# Patient Record
Sex: FEMALE | Race: WHITE | NOT HISPANIC OR LATINO | Employment: PART TIME | URBAN - METROPOLITAN AREA
[De-identification: names, ages, dates, MRNs, and addresses within clinical notes are randomized per-mention and may not be internally consistent; named-entity substitution may affect disease eponyms.]

---

## 2017-03-10 ENCOUNTER — HOSPITAL ENCOUNTER (OUTPATIENT)
Dept: LAB | Facility: MEDICAL CENTER | Age: 57
End: 2017-03-10
Attending: FAMILY MEDICINE
Payer: COMMERCIAL

## 2017-03-10 LAB — TSH SERPL DL<=0.005 MIU/L-ACNC: 2.63 UIU/ML (ref 0.3–3.7)

## 2017-03-10 PROCEDURE — 84443 ASSAY THYROID STIM HORMONE: CPT

## 2017-04-26 DIAGNOSIS — L73.9 FOLLICULITIS: ICD-10-CM

## 2017-04-26 RX ORDER — CIPROFLOXACIN 500 MG/1
500 TABLET, FILM COATED ORAL 2 TIMES DAILY
Qty: 28 TAB | Refills: 1 | Status: SHIPPED | OUTPATIENT
Start: 2017-04-26 | End: 2017-05-10

## 2017-06-09 ENCOUNTER — HOSPITAL ENCOUNTER (OUTPATIENT)
Facility: MEDICAL CENTER | Age: 57
End: 2017-06-09
Attending: FAMILY MEDICINE
Payer: COMMERCIAL

## 2017-06-09 LAB
AMBIGUOUS DTTM AMBI4: NORMAL
GRAM STN SPEC: NORMAL
SIGNIFICANT IND 70042: NORMAL
SIGNIFICANT IND 70042: NORMAL
SITE SITE: NORMAL
SITE SITE: NORMAL
SOURCE SOURCE: NORMAL
SOURCE SOURCE: NORMAL

## 2017-06-09 PROCEDURE — 87070 CULTURE OTHR SPECIMN AEROBIC: CPT

## 2017-06-09 PROCEDURE — 87205 SMEAR GRAM STAIN: CPT

## 2017-06-09 PROCEDURE — 87077 CULTURE AEROBIC IDENTIFY: CPT

## 2017-06-09 PROCEDURE — 87186 SC STD MICRODIL/AGAR DIL: CPT

## 2017-06-11 LAB
BACTERIA WND AEROBE CULT: ABNORMAL
GRAM STN SPEC: ABNORMAL
SIGNIFICANT IND 70042: ABNORMAL
SITE SITE: ABNORMAL
SOURCE SOURCE: ABNORMAL

## 2017-09-12 ENCOUNTER — HOSPITAL ENCOUNTER (OUTPATIENT)
Dept: LAB | Facility: MEDICAL CENTER | Age: 57
End: 2017-09-12
Payer: COMMERCIAL

## 2017-09-12 LAB
BDY FAT % MEASURED: 42.1 %
BP DIAS: 82 MMHG
BP SYS: 135 MMHG
CHOLEST SERPL-MCNC: 207 MG/DL (ref 100–199)
DIABETES HTDIA: NO
EVENT NAME HTEVT: NORMAL
FASTING HTFAS: YES
GLUCOSE SERPL-MCNC: 84 MG/DL (ref 65–99)
HDLC SERPL-MCNC: 38 MG/DL
HYPERTENSION HTHYP: NO
LDLC SERPL CALC-MCNC: 140 MG/DL
SCREENING LOC CITY HTCIT: NORMAL
SCREENING LOC STATE HTSTA: NORMAL
SCREENING LOCATION HTLOC: NORMAL
SMOKING HTSMO: NO
SUBSCRIBER ID HTSID: NORMAL
TRIGL SERPL-MCNC: 147 MG/DL (ref 0–149)

## 2017-09-12 PROCEDURE — 36415 COLL VENOUS BLD VENIPUNCTURE: CPT

## 2017-09-12 PROCEDURE — 82947 ASSAY GLUCOSE BLOOD QUANT: CPT

## 2017-09-12 PROCEDURE — S5190 WELLNESS ASSESSMENT BY NONPH: HCPCS

## 2017-09-12 PROCEDURE — 80061 LIPID PANEL: CPT

## 2017-11-29 ENCOUNTER — HOSPITAL ENCOUNTER (OUTPATIENT)
Facility: MEDICAL CENTER | Age: 57
End: 2017-11-29
Attending: UROLOGY
Payer: COMMERCIAL

## 2017-11-29 PROCEDURE — 87086 URINE CULTURE/COLONY COUNT: CPT

## 2017-12-01 LAB
BACTERIA UR CULT: NORMAL
SIGNIFICANT IND 70042: NORMAL
SITE SITE: NORMAL
SOURCE SOURCE: NORMAL

## 2017-12-08 ENCOUNTER — HOSPITAL ENCOUNTER (OUTPATIENT)
Dept: RADIOLOGY | Facility: MEDICAL CENTER | Age: 57
End: 2017-12-08
Attending: UROLOGY
Payer: COMMERCIAL

## 2017-12-08 DIAGNOSIS — N23 RENAL COLIC: ICD-10-CM

## 2017-12-08 DIAGNOSIS — R31.0 GROSS HEMATURIA: ICD-10-CM

## 2017-12-08 DIAGNOSIS — Z87.442 PERSONAL HISTORY OF URINARY CALCULI: ICD-10-CM

## 2017-12-08 PROCEDURE — 74176 CT ABD & PELVIS W/O CONTRAST: CPT

## 2017-12-29 ENCOUNTER — HOSPITAL ENCOUNTER (OUTPATIENT)
Facility: MEDICAL CENTER | Age: 57
End: 2017-12-29
Attending: UROLOGY
Payer: COMMERCIAL

## 2017-12-29 LAB
ALBUMIN SERPL BCP-MCNC: 3.9 G/DL (ref 3.2–4.9)
ALBUMIN/GLOB SERPL: 1.4 G/DL
ALP SERPL-CCNC: 33 U/L (ref 30–99)
ALT SERPL-CCNC: 20 U/L (ref 2–50)
ANION GAP SERPL CALC-SCNC: 5 MMOL/L (ref 0–11.9)
AST SERPL-CCNC: 22 U/L (ref 12–45)
BILIRUB SERPL-MCNC: 0.5 MG/DL (ref 0.1–1.5)
BUN SERPL-MCNC: 17 MG/DL (ref 8–22)
CALCIUM SERPL-MCNC: 9.4 MG/DL (ref 8.5–10.5)
CHLORIDE SERPL-SCNC: 106 MMOL/L (ref 96–112)
CO2 SERPL-SCNC: 30 MMOL/L (ref 20–33)
COLLECT DURATION TIME UR: 24 HR
CREAT CL/1.73 SQ M ?TM UR+SERPL-ARVRAT: 155 ML/MIN (ref 88–128)
CREAT SERPL-MCNC: 0.7 MG/DL (ref 0.5–1.4)
CREAT SERPL-MCNC: 0.7 MG/DL (ref 0.5–1.4)
CREAT UR-MCNC: 179.5 MG/DL
GFR SERPL CREATININE-BSD FRML MDRD: >60 ML/MIN/1.73 M 2
GLOBULIN SER CALC-MCNC: 2.7 G/DL (ref 1.9–3.5)
GLUCOSE SERPL-MCNC: 102 MG/DL (ref 65–99)
POTASSIUM SERPL-SCNC: 4.6 MMOL/L (ref 3.6–5.5)
PROT 24H UR-MCNC: 161 MG/24 HR (ref 30–150)
PROT 24H UR-MRATE: 16.1 MG/DL (ref 0–15)
PROT SERPL-MCNC: 6.6 G/DL (ref 6–8.2)
SODIUM SERPL-SCNC: 141 MMOL/L (ref 135–145)
SPECIMEN VOL UR: 1000 ML
URATE SERPL-MCNC: 3.7 MG/DL (ref 1.9–8.2)
URINE CREATININE EXCRETED 1125: 1795 MG/24 HR
UUN 24H UR-MRATE: 8330 MG/24 HR (ref 12000–20000)

## 2017-12-29 PROCEDURE — 84105 ASSAY OF URINE PHOSPHORUS: CPT

## 2017-12-29 PROCEDURE — 82507 ASSAY OF CITRATE: CPT

## 2017-12-29 PROCEDURE — 82340 ASSAY OF CALCIUM IN URINE: CPT

## 2017-12-29 PROCEDURE — 84560 ASSAY OF URINE/URIC ACID: CPT

## 2017-12-29 PROCEDURE — 84156 ASSAY OF PROTEIN URINE: CPT

## 2017-12-29 PROCEDURE — 84392 ASSAY OF URINE SULFATE: CPT

## 2017-12-29 PROCEDURE — 84550 ASSAY OF BLOOD/URIC ACID: CPT

## 2017-12-29 PROCEDURE — 82575 CREATININE CLEARANCE TEST: CPT

## 2017-12-29 PROCEDURE — 80053 COMPREHEN METABOLIC PANEL: CPT

## 2017-12-29 PROCEDURE — 82131 AMINO ACIDS SINGLE QUANT: CPT

## 2017-12-29 PROCEDURE — 84300 ASSAY OF URINE SODIUM: CPT

## 2017-12-29 PROCEDURE — 83735 ASSAY OF MAGNESIUM: CPT

## 2017-12-29 PROCEDURE — 83945 ASSAY OF OXALATE: CPT

## 2017-12-29 PROCEDURE — 83970 ASSAY OF PARATHORMONE: CPT

## 2017-12-29 PROCEDURE — 82436 ASSAY OF URINE CHLORIDE: CPT

## 2017-12-29 PROCEDURE — 81050 URINALYSIS VOLUME MEASURE: CPT

## 2017-12-29 PROCEDURE — 83986 ASSAY PH BODY FLUID NOS: CPT

## 2017-12-29 PROCEDURE — 84540 ASSAY OF URINE/UREA-N: CPT

## 2017-12-29 PROCEDURE — 84133 ASSAY OF URINE POTASSIUM: CPT

## 2017-12-30 LAB — PTH-INTACT SERPL-MCNC: 27.1 PG/ML (ref 14–72)

## 2018-01-04 LAB
CREAT UR-MCNC: 165 MG/DL
CYSTINE 24H UR-MCNC: 1.23 MG/DL
CYSTINE 24H UR-MRATE: 12 MG/D
CYSTINE/CREAT 24H UR-RTO: 31 UMOL/G CRT (ref 12–81)

## 2018-01-05 LAB
CA H2 PHOS DIHYD 24H UR-MRATE: 3.31
CALCIUM 24H UR-MCNC: 31.9 MG/DL
CALCIUM 24H UR-MRATE: 1.47 MG/(24.H)
CALCIUM 24H UR-MRATE: 319 MG/D
CAOX 24H UR-MRATE: 6.88
CHLORIDE 24H UR-SCNC: 187 MMOL/L
CHLORIDE 24H UR-SRATE: 187 MMOL/D (ref 140–250)
CITRATE 24H UR-MCNC: 905 MG/L
CITRATE 24H UR-MRATE: 905 MG/D (ref 320–1240)
COLLECT DURATION TIME SPEC: 24 HRS
CREAT 24H UR-MCNC: 159 MG/DL
CREAT 24H UR-MRATE: 1590 MG/D (ref 500–1400)
EER SUPERSAT PROFILE UR Q2098: ABNORMAL
MAGNESIUM 24H UR-MCNC: 11.8 MG/DL
MAGNESIUM 24H UR-MRATE: 118 MG/D (ref 12–199)
OXALATE 24H UR-MCNC: 18 MG/L
OXALATE 24H UR-MRATE: 18 MG/D (ref 13–40)
PH UR: 5.84 [PH] (ref 5–7.5)
PHOSPHATE 24H UR-MCNC: 89 MG/DL
PHOSPHATE 24H UR-MRATE: 890 MG/D (ref 400–1300)
POTASSIUM 24H UR-SCNC: 74 MMOL/L
POTASSIUM 24H UR-SRATE: 74 MMOL/D (ref 25–125)
REF LAB TEST RESULTS: ABNORMAL
SODIUM 24H UR-SCNC: 202 MMOL/L
SODIUM 24H UR-SRATE: 202 MMOL/D (ref 51–286)
SULFATE 24H UR-SRATE: 33 MMOL/D (ref 6–30)
SULFATE UR-SCNC: 33 MMOL/L
TOTAL VOLUME 1105: 1000 ML
URATE 24H UR-MCNC: 76.7 MG/DL
URATE 24H UR-MRATE: 767 MG/D (ref 250–750)

## 2018-03-09 ENCOUNTER — HOSPITAL ENCOUNTER (OUTPATIENT)
Dept: LAB | Facility: MEDICAL CENTER | Age: 58
End: 2018-03-09
Attending: FAMILY MEDICINE
Payer: COMMERCIAL

## 2018-03-09 LAB — TSH SERPL DL<=0.005 MIU/L-ACNC: 10.11 UIU/ML (ref 0.38–5.33)

## 2018-03-09 PROCEDURE — 36415 COLL VENOUS BLD VENIPUNCTURE: CPT

## 2018-03-09 PROCEDURE — 84443 ASSAY THYROID STIM HORMONE: CPT

## 2018-03-14 ENCOUNTER — HOSPITAL ENCOUNTER (OUTPATIENT)
Dept: LAB | Facility: MEDICAL CENTER | Age: 58
End: 2018-03-14
Attending: UROLOGY
Payer: COMMERCIAL

## 2018-03-14 PROCEDURE — 87086 URINE CULTURE/COLONY COUNT: CPT

## 2018-03-16 LAB
BACTERIA UR CULT: NORMAL
SIGNIFICANT IND 70042: NORMAL
SITE SITE: NORMAL
SOURCE SOURCE: NORMAL

## 2018-04-13 ENCOUNTER — HOSPITAL ENCOUNTER (OUTPATIENT)
Dept: RADIOLOGY | Facility: MEDICAL CENTER | Age: 58
End: 2018-04-13
Attending: NURSE PRACTITIONER
Payer: COMMERCIAL

## 2018-04-13 DIAGNOSIS — Z12.31 VISIT FOR SCREENING MAMMOGRAM: ICD-10-CM

## 2018-04-13 PROCEDURE — 77067 SCR MAMMO BI INCL CAD: CPT

## 2018-05-14 ENCOUNTER — HOSPITAL ENCOUNTER (OUTPATIENT)
Dept: RADIOLOGY | Facility: MEDICAL CENTER | Age: 58
End: 2018-05-14
Attending: UROLOGY
Payer: COMMERCIAL

## 2018-05-14 ENCOUNTER — HOSPITAL ENCOUNTER (OUTPATIENT)
Facility: MEDICAL CENTER | Age: 58
End: 2018-05-14
Attending: UROLOGY
Payer: COMMERCIAL

## 2018-05-14 DIAGNOSIS — R10.9 STOMACH ACHE: ICD-10-CM

## 2018-05-14 PROCEDURE — 74176 CT ABD & PELVIS W/O CONTRAST: CPT

## 2018-05-14 PROCEDURE — 87086 URINE CULTURE/COLONY COUNT: CPT

## 2018-05-14 PROCEDURE — 87186 SC STD MICRODIL/AGAR DIL: CPT

## 2018-05-14 PROCEDURE — 87077 CULTURE AEROBIC IDENTIFY: CPT

## 2018-05-15 LAB
AMBIGUOUS DTTM AMBI4: NORMAL
SIGNIFICANT IND 70042: NORMAL
SITE SITE: NORMAL
SOURCE SOURCE: NORMAL

## 2018-05-17 LAB
BACTERIA UR CULT: ABNORMAL
BACTERIA UR CULT: ABNORMAL
SIGNIFICANT IND 70042: ABNORMAL
SITE SITE: ABNORMAL
SOURCE SOURCE: ABNORMAL

## 2018-05-18 DIAGNOSIS — Z01.812 PRE-OPERATIVE LABORATORY EXAMINATION: ICD-10-CM

## 2018-05-18 LAB
ALBUMIN SERPL BCP-MCNC: 4.4 G/DL (ref 3.2–4.9)
ALBUMIN/GLOB SERPL: 1.6 G/DL
ALP SERPL-CCNC: 43 U/L (ref 30–99)
ALT SERPL-CCNC: 48 U/L (ref 2–50)
ANION GAP SERPL CALC-SCNC: 6 MMOL/L (ref 0–11.9)
APPEARANCE UR: CLEAR
AST SERPL-CCNC: 26 U/L (ref 12–45)
BASOPHILS # BLD AUTO: 1.6 % (ref 0–1.8)
BASOPHILS # BLD: 0.09 K/UL (ref 0–0.12)
BILIRUB SERPL-MCNC: 0.5 MG/DL (ref 0.1–1.5)
BILIRUB UR QL STRIP.AUTO: NEGATIVE
BUN SERPL-MCNC: 14 MG/DL (ref 8–22)
CALCIUM SERPL-MCNC: 9.6 MG/DL (ref 8.5–10.5)
CHLORIDE SERPL-SCNC: 103 MMOL/L (ref 96–112)
CO2 SERPL-SCNC: 30 MMOL/L (ref 20–33)
COLOR UR: YELLOW
CREAT SERPL-MCNC: 0.77 MG/DL (ref 0.5–1.4)
EOSINOPHIL # BLD AUTO: 0.37 K/UL (ref 0–0.51)
EOSINOPHIL NFR BLD: 6.8 % (ref 0–6.9)
ERYTHROCYTE [DISTWIDTH] IN BLOOD BY AUTOMATED COUNT: 43.2 FL (ref 35.9–50)
GLOBULIN SER CALC-MCNC: 2.8 G/DL (ref 1.9–3.5)
GLUCOSE SERPL-MCNC: 81 MG/DL (ref 65–99)
GLUCOSE UR STRIP.AUTO-MCNC: NEGATIVE MG/DL
HCT VFR BLD AUTO: 42.5 % (ref 37–47)
HGB BLD-MCNC: 14.3 G/DL (ref 12–16)
IMM GRANULOCYTES # BLD AUTO: 0.01 K/UL (ref 0–0.11)
IMM GRANULOCYTES NFR BLD AUTO: 0.2 % (ref 0–0.9)
KETONES UR STRIP.AUTO-MCNC: NEGATIVE MG/DL
LEUKOCYTE ESTERASE UR QL STRIP.AUTO: NEGATIVE
LYMPHOCYTES # BLD AUTO: 1.3 K/UL (ref 1–4.8)
LYMPHOCYTES NFR BLD: 23.7 % (ref 22–41)
MCH RBC QN AUTO: 30.4 PG (ref 27–33)
MCHC RBC AUTO-ENTMCNC: 33.6 G/DL (ref 33.6–35)
MCV RBC AUTO: 90.4 FL (ref 81.4–97.8)
MICRO URNS: NORMAL
MONOCYTES # BLD AUTO: 0.54 K/UL (ref 0–0.85)
MONOCYTES NFR BLD AUTO: 9.9 % (ref 0–13.4)
NEUTROPHILS # BLD AUTO: 3.17 K/UL (ref 2–7.15)
NEUTROPHILS NFR BLD: 57.8 % (ref 44–72)
NITRITE UR QL STRIP.AUTO: NEGATIVE
NRBC # BLD AUTO: 0 K/UL
NRBC BLD-RTO: 0 /100 WBC
PH UR STRIP.AUTO: 7 [PH]
PLATELET # BLD AUTO: 245 K/UL (ref 164–446)
PMV BLD AUTO: 11.7 FL (ref 9–12.9)
POTASSIUM SERPL-SCNC: 3.7 MMOL/L (ref 3.6–5.5)
PROT SERPL-MCNC: 7.2 G/DL (ref 6–8.2)
PROT UR QL STRIP: NEGATIVE MG/DL
RBC # BLD AUTO: 4.7 M/UL (ref 4.2–5.4)
RBC UR QL AUTO: NEGATIVE
SODIUM SERPL-SCNC: 139 MMOL/L (ref 135–145)
SP GR UR STRIP.AUTO: 1.01
UROBILINOGEN UR STRIP.AUTO-MCNC: 0.2 MG/DL
WBC # BLD AUTO: 5.5 K/UL (ref 4.8–10.8)

## 2018-05-18 PROCEDURE — 87086 URINE CULTURE/COLONY COUNT: CPT

## 2018-05-18 PROCEDURE — 36415 COLL VENOUS BLD VENIPUNCTURE: CPT

## 2018-05-18 PROCEDURE — 85025 COMPLETE CBC W/AUTO DIFF WBC: CPT

## 2018-05-18 PROCEDURE — 81003 URINALYSIS AUTO W/O SCOPE: CPT

## 2018-05-18 PROCEDURE — 80053 COMPREHEN METABOLIC PANEL: CPT

## 2018-05-18 RX ORDER — LEVOTHYROXINE SODIUM 137 UG/1
137 TABLET ORAL
Status: SHIPPED | COMMUNITY
End: 2022-07-12 | Stop reason: SDUPTHER

## 2018-05-20 LAB
BACTERIA UR CULT: NORMAL
SIGNIFICANT IND 70042: NORMAL
SITE SITE: NORMAL
SOURCE SOURCE: NORMAL

## 2018-05-23 ENCOUNTER — HOSPITAL ENCOUNTER (OUTPATIENT)
Dept: RADIOLOGY | Facility: MEDICAL CENTER | Age: 58
End: 2018-05-23
Attending: UROLOGY | Admitting: UROLOGY
Payer: COMMERCIAL

## 2018-05-23 ENCOUNTER — HOSPITAL ENCOUNTER (OUTPATIENT)
Facility: MEDICAL CENTER | Age: 58
End: 2018-05-24
Attending: UROLOGY | Admitting: UROLOGY
Payer: COMMERCIAL

## 2018-05-23 ENCOUNTER — APPOINTMENT (OUTPATIENT)
Dept: RADIOLOGY | Facility: MEDICAL CENTER | Age: 58
End: 2018-05-23
Attending: UROLOGY
Payer: COMMERCIAL

## 2018-05-23 DIAGNOSIS — N20.0 CALCULUS OF KIDNEY: ICD-10-CM

## 2018-05-23 PROCEDURE — 160025 RECOVERY II MINUTES (STATS): Performed by: UROLOGY

## 2018-05-23 PROCEDURE — C1769 GUIDE WIRE: HCPCS | Performed by: UROLOGY

## 2018-05-23 PROCEDURE — 160009 HCHG ANES TIME/MIN: Performed by: UROLOGY

## 2018-05-23 PROCEDURE — 160048 HCHG OR STATISTICAL LEVEL 1-5: Performed by: UROLOGY

## 2018-05-23 PROCEDURE — 160029 HCHG SURGERY MINUTES - 1ST 30 MINS LEVEL 4: Performed by: UROLOGY

## 2018-05-23 PROCEDURE — C1758 CATHETER, URETERAL: HCPCS | Performed by: UROLOGY

## 2018-05-23 PROCEDURE — 700102 HCHG RX REV CODE 250 W/ 637 OVERRIDE(OP)

## 2018-05-23 PROCEDURE — 160035 HCHG PACU - 1ST 60 MINS PHASE I: Performed by: UROLOGY

## 2018-05-23 PROCEDURE — C1894 INTRO/SHEATH, NON-LASER: HCPCS | Performed by: UROLOGY

## 2018-05-23 PROCEDURE — 160047 HCHG PACU  - EA ADDL 30 MINS PHASE II: Performed by: UROLOGY

## 2018-05-23 PROCEDURE — 700111 HCHG RX REV CODE 636 W/ 250 OVERRIDE (IP)

## 2018-05-23 PROCEDURE — 700101 HCHG RX REV CODE 250: Mod: JW

## 2018-05-23 PROCEDURE — 501329 HCHG SET, CYSTO IRRIG Y TUR: Performed by: UROLOGY

## 2018-05-23 PROCEDURE — 160002 HCHG RECOVERY MINUTES (STAT): Performed by: UROLOGY

## 2018-05-23 PROCEDURE — 74018 RADEX ABDOMEN 1 VIEW: CPT

## 2018-05-23 PROCEDURE — 500879 HCHG PACK, CYSTO: Performed by: UROLOGY

## 2018-05-23 PROCEDURE — A9270 NON-COVERED ITEM OR SERVICE: HCPCS

## 2018-05-23 PROCEDURE — 160036 HCHG PACU - EA ADDL 30 MINS PHASE I: Performed by: UROLOGY

## 2018-05-23 PROCEDURE — 160041 HCHG SURGERY MINUTES - EA ADDL 1 MIN LEVEL 4: Performed by: UROLOGY

## 2018-05-23 PROCEDURE — G0378 HOSPITAL OBSERVATION PER HR: HCPCS

## 2018-05-23 PROCEDURE — 160046 HCHG PACU - 1ST 60 MINS PHASE II: Performed by: UROLOGY

## 2018-05-23 RX ORDER — LIDOCAINE HYDROCHLORIDE 10 MG/ML
0.5 INJECTION, SOLUTION INFILTRATION; PERINEURAL
Status: DISCONTINUED | OUTPATIENT
Start: 2018-05-23 | End: 2018-05-24 | Stop reason: HOSPADM

## 2018-05-23 RX ORDER — TRIAMCINOLONE ACETONIDE 1 MG/G
CREAM TOPICAL PRN
Status: SHIPPED | COMMUNITY
End: 2022-11-18

## 2018-05-23 RX ORDER — HALOPERIDOL 5 MG/ML
INJECTION INTRAMUSCULAR
Status: COMPLETED
Start: 2018-05-23 | End: 2018-05-23

## 2018-05-23 RX ORDER — ONDANSETRON 2 MG/ML
4 INJECTION INTRAMUSCULAR; INTRAVENOUS EVERY 6 HOURS PRN
Status: DISCONTINUED | OUTPATIENT
Start: 2018-05-23 | End: 2018-05-24 | Stop reason: HOSPADM

## 2018-05-23 RX ORDER — SODIUM CHLORIDE, SODIUM LACTATE, POTASSIUM CHLORIDE, CALCIUM CHLORIDE 600; 310; 30; 20 MG/100ML; MG/100ML; MG/100ML; MG/100ML
1000 INJECTION, SOLUTION INTRAVENOUS CONTINUOUS
Status: DISCONTINUED | OUTPATIENT
Start: 2018-05-23 | End: 2018-05-24 | Stop reason: HOSPADM

## 2018-05-23 RX ORDER — SCOLOPAMINE TRANSDERMAL SYSTEM 1 MG/1
PATCH, EXTENDED RELEASE TRANSDERMAL
Status: COMPLETED
Start: 2018-05-23 | End: 2018-05-23

## 2018-05-23 RX ORDER — DIPHENHYDRAMINE HYDROCHLORIDE 50 MG/ML
INJECTION INTRAMUSCULAR; INTRAVENOUS
Status: COMPLETED
Start: 2018-05-23 | End: 2018-05-23

## 2018-05-23 RX ORDER — HYDROCODONE BITARTRATE AND ACETAMINOPHEN 5; 325 MG/1; MG/1
1-2 TABLET ORAL EVERY 4 HOURS PRN
Status: DISCONTINUED | OUTPATIENT
Start: 2018-05-23 | End: 2018-05-24 | Stop reason: HOSPADM

## 2018-05-23 RX ORDER — SODIUM CHLORIDE, SODIUM LACTATE, POTASSIUM CHLORIDE, CALCIUM CHLORIDE 600; 310; 30; 20 MG/100ML; MG/100ML; MG/100ML; MG/100ML
INJECTION, SOLUTION INTRAVENOUS CONTINUOUS
Status: DISCONTINUED | OUTPATIENT
Start: 2018-05-23 | End: 2018-05-24 | Stop reason: HOSPADM

## 2018-05-23 RX ORDER — ASPIRIN 81 MG/1
81 TABLET, CHEWABLE ORAL DAILY
COMMUNITY

## 2018-05-23 RX ORDER — HYDROCODONE BITARTRATE AND ACETAMINOPHEN 5; 325 MG/1; MG/1
TABLET ORAL
Status: COMPLETED
Start: 2018-05-23 | End: 2018-05-23

## 2018-05-23 RX ORDER — OXYCODONE HCL 5 MG/5 ML
SOLUTION, ORAL ORAL
Status: COMPLETED
Start: 2018-05-23 | End: 2018-05-23

## 2018-05-23 RX ADMIN — HALOPERIDOL LACTATE 1 MG: 5 INJECTION, SOLUTION INTRAMUSCULAR at 17:18

## 2018-05-23 RX ADMIN — HYDROCODONE BITARTRATE AND ACETAMINOPHEN 2 TABLET: 5; 325 TABLET ORAL at 21:37

## 2018-05-23 RX ADMIN — HALOPERIDOL LACTATE 1 MG: 5 INJECTION, SOLUTION INTRAMUSCULAR at 17:28

## 2018-05-23 RX ADMIN — OXYCODONE HYDROCHLORIDE 5 MG: 5 SOLUTION ORAL at 17:43

## 2018-05-23 RX ADMIN — SODIUM CHLORIDE, SODIUM LACTATE, POTASSIUM CHLORIDE, CALCIUM CHLORIDE 1000 ML: 600; 310; 30; 20 INJECTION, SOLUTION INTRAVENOUS at 21:15

## 2018-05-23 RX ADMIN — HYDROMORPHONE HYDROCHLORIDE 0.5 MG: 10 INJECTION, SOLUTION INTRAMUSCULAR; INTRAVENOUS; SUBCUTANEOUS at 17:28

## 2018-05-23 RX ADMIN — FENTANYL CITRATE 50 MCG: 50 INJECTION, SOLUTION INTRAMUSCULAR; INTRAVENOUS at 17:19

## 2018-05-23 RX ADMIN — HYDROMORPHONE HYDROCHLORIDE 0.5 MG: 10 INJECTION, SOLUTION INTRAMUSCULAR; INTRAVENOUS; SUBCUTANEOUS at 18:18

## 2018-05-23 RX ADMIN — SCOPOLAMINE 1 PATCH: 1 PATCH, EXTENDED RELEASE TRANSDERMAL at 16:00

## 2018-05-23 RX ADMIN — DIPHENHYDRAMINE HYDROCHLORIDE 12.5 MG: 50 INJECTION INTRAMUSCULAR; INTRAVENOUS at 17:43

## 2018-05-23 ASSESSMENT — PAIN SCALES - GENERAL
PAINLEVEL_OUTOF10: 5
PAINLEVEL_OUTOF10: 5
PAINLEVEL_OUTOF10: 6
PAINLEVEL_OUTOF10: 5
PAINLEVEL_OUTOF10: 0
PAINLEVEL_OUTOF10: 6
PAINLEVEL_OUTOF10: 5
PAINLEVEL_OUTOF10: 8
PAINLEVEL_OUTOF10: 1
PAINLEVEL_OUTOF10: 6

## 2018-05-23 ASSESSMENT — PATIENT HEALTH QUESTIONNAIRE - PHQ9
SUM OF ALL RESPONSES TO PHQ9 QUESTIONS 1 AND 2: 0
1. LITTLE INTEREST OR PLEASURE IN DOING THINGS: NOT AT ALL
2. FEELING DOWN, DEPRESSED, IRRITABLE, OR HOPELESS: NOT AT ALL

## 2018-05-23 ASSESSMENT — LIFESTYLE VARIABLES: EVER_SMOKED: NEVER

## 2018-05-24 ENCOUNTER — PATIENT OUTREACH (OUTPATIENT)
Dept: HEALTH INFORMATION MANAGEMENT | Facility: OTHER | Age: 58
End: 2018-05-24

## 2018-05-24 VITALS
DIASTOLIC BLOOD PRESSURE: 61 MMHG | WEIGHT: 222.88 LBS | HEIGHT: 65 IN | BODY MASS INDEX: 37.13 KG/M2 | OXYGEN SATURATION: 93 % | SYSTOLIC BLOOD PRESSURE: 132 MMHG | RESPIRATION RATE: 14 BRPM | TEMPERATURE: 97.8 F | HEART RATE: 64 BPM

## 2018-05-24 PROBLEM — N20.1 RIGHT URETERAL STONE: Status: ACTIVE | Noted: 2018-05-24

## 2018-05-24 PROCEDURE — G0378 HOSPITAL OBSERVATION PER HR: HCPCS

## 2018-05-24 ASSESSMENT — PAIN SCALES - GENERAL: PAINLEVEL_OUTOF10: 0

## 2018-05-24 ASSESSMENT — LIFESTYLE VARIABLES: ALCOHOL_USE: NO

## 2018-05-24 NOTE — PROGRESS NOTES
Assessment completed. Pt A&Ox4. Respirations are even and unlabored on RA. Pt denies pain at this time. Patient tolerating fluids at this time. Monitors applied, VS stable, call light and belongings within reach. POC updated. Pt educated on room and call light, pt verbalized understanding. Communication board updated. Needs met.

## 2018-05-24 NOTE — PROGRESS NOTES
IV Dc 'd. Discharge instructions provided to patient. Pt verbalizes understanding. Pt states all questions have been answered. Copy of DC provided to patient. Signed copy in chart. Pt states all personal belongings are in possession. Pt escorted off unit by this RN without incident.

## 2018-05-24 NOTE — OP REPORT
DATE OF SERVICE:  05/23/2018    PREOPERATIVE DIAGNOSIS:  Right proximal ureteral stone.    PROCEDURE PERFORMED:  1.  Rigid cystourethroscopy.  2.  Right flexible ureteroscopy with laser lithotripsy of stone pushed back   into the right renal unit.    POSTOPERATIVE DIAGNOSIS:  Right proximal ureteral stone..    SURGEON:  Matteo Rolle MD    ANESTHESIA:  General laryngeal mask.    ANESTHESIOLOGIST:  Dr. Carl Henry.    COMPLICATIONS:  None.    DRAINS:  None.    INDICATIONS:  The patient is a pleasant 58-year-old woman with a history of   right flank pain and a recent urinary tract infection.  She was treated with   IM Rocephin for 2 days and switched to ciprofloxacin.  When the cultures came   back with sensitivities showing the antibiotics of Cipro to be sent to the   patient has done well since starting antibiotic therapy, but she has a 6 mm   stone identified on the CAT scan.  I explained to the patient I would   recommend treatment of the stone is unlikely to pass and is in proximal   ureter.  Prior to surgery, we discussed the options of shockwave lithotripsy   versus retrograde ureteroscopy, laser lithotripsy, and possible stent.  She is   interested in the latter approach and understands the risk of procedure   include but not limited to risk of perioperative urinary tract infection,   urosepsis, risk of ureteral perforation, the possibility that the stone cannot   not be treated and secondary procedure would be required.  We have also   discussed the risk of stent pain, urgency, frequency and migration of position   as well as the perioperative risk of deep vein thrombosis, pulmonary   embolism, aspiration pneumonia and death.  Informed consent was given to me by   the patient to proceed and she is marked just below her knee on the right   side with my initials in letter Y for proper site right-sided surgery.    DESCRIPTION IN DETAIL:  After informed consent was obtained, the patient was   brought to the  operating room and placed in supine.  A general laryngeal mask   anesthetic administered in balanced fashion.  Bilateral sequential compression   devices are in place and activated and the patient is given IV ciprofloxacin.    She was positioned in modified lithotomy and the operative area was Betadine   prepped and draped in usual sterile fashion.  A surgical time-out was called.    All members of the operative team agree as to the patient's name, procedure   to be performed without objections, attention was directed to the procedure.    I began the procedure by passing a generously lubricated 25-Macedonian rigid   cystoscope per urethra.  Urethra was normal.  Left ureteral orifice is   orthotopic and has clear efflux.  Right ureteral orifice is orthotopic.  It   has mild reflux.  There is a stone in the proximal ureter right at the UPJ as   based on fluoroscopy.  I pushed a 0.38 sensor wire into the right ureteral   orifice and advanced it up to the stone.  The stone went into the mid pole of   the kidney.  I left the guidewire in place and passed ureteral access sheath   inner obturator over the wire.  Then, the 11-Macedonian tapered to 13 Macedonian   ureteral access sheath was positioned in the proximal ureter.  With   hydrodistention and the disposable [employer] flexible ureteroscope   which is digital.  I passed the scope up into the kidney, performed serial   rhinoscopy.  Upper pole anatomy was normal.  Midpole shows a yellowish   brownish stone, which has calcium oxalate dihydrate appearance on the outside   and stronger dense stone in the middle part.  The inferior calyceal anatomy   showed some Sanjeev plaques, but no other stones are seen.  This stone was   pushed in the mid pole porsha treated into very small fragments by all less   than 1 mm utilizing a frequency of 5 Hz and 400 millijoules and then I   decreased the energy to 200 millijoules and used 40 Hz for powdering technique   which allowed to break  the stone into small fragments.  At the end of the   case, all fragments had been evaluated and appeared small enough to pass.  I   went ahead and pushed the safety wire back in the kidney, withdrew the scope   and the ureteral access sheath.  Evaluation of the ureter showed a patent   ureter.  There were no stones in the ureter or significant edema at the UPJ   where the stone had lodged and subsequently when I withdrew the scope, I left   the wire in place, I passed the cystoscope in, withdrew the wire and observed   efflux of bloody efflux in the right ureteral orifice with pretty good   pressures, I elected not to leave a stent. At the end of the case, the patient   tolerated the procedure well.  She was awake in the operating room and   transferred to recovery room, she arrived in stable condition.       ____________________________________     MD RYELE LONG / SHARON    DD:  05/23/2018 17:25:10  DT:  05/23/2018 18:13:04    D#:  [id number]  Job#:  [id number]

## 2018-05-24 NOTE — OR SURGEON
Immediate Post OP Note    PreOp Diagnosis: Right Proximal Ureteral stone    PostOp Diagnosis: As above    Procedure(s):  CYSTOSCOPY - Wound Class: Clean Contaminated  RIGHT FLEXIBLE URETEROSCOPY WITH LASER LITHOTRIPSY- Wound Class: Clean Contaminated      Surgeon(s):  Matteo Rolle M.D.    Anesthesiologist/Type of Anesthesia:  Anesthesiologist: Carl Henry M.D./General LMA    Surgical Staff:  Circulator: Eloise Wilcox R.N.  Laser Staff: Renee Del Valle  Scrub Person: Mai Michael    Specimens removed if any:  None    Estimated Blood Loss: N/A    Findings: 6mm stone pushed back into the right kidney with guide wire  Complications: None        5/23/2018 5:08 PM Matteo Rolle M.D.

## 2018-05-24 NOTE — OR NURSING
Pt remains mildly nauseous but tolerating PO intake. Reports this is normal for her post-procedure. Pain improved with analgesics but pt remains sleepy. Requiring .5-1L FiO2 to maintain SpO2 >90% while sleeping. Report to Vernell QUEZADA in PACU 2. Pt to go to stage to on FiO2 so she can be with family while she completes recovery.

## 2018-05-24 NOTE — DISCHARGE INSTRUCTIONS
Discharge Instructions    Discharged to home by car with relative. Discharged via walking, hospital escort: Refused.  Special equipment needed: Not Applicable    Be sure to schedule a follow-up appointment with your primary care doctor or any specialists as instructed.     Discharge Plan:   Diet Plan: Discussed  Activity Level: Discussed  Confirmed Follow up Appointment: Patient to Call and Schedule Appointment  Confirmed Symptoms Management: Discussed  Medication Reconciliation Updated: Yes  Influenza Vaccine Indication: Not indicated: Previously immunized this influenza season and > 8 years of age    I understand that a diet low in cholesterol, fat, and sodium is recommended for good health. Unless I have been given specific instructions below for another diet, I accept this instruction as my diet prescription.   Other diet: heart healthy    Special Instructions: None    · Is patient discharged on Warfarin / Coumadin?   No         Depression / Suicide Risk    As you are discharged from this [facility] facility, it is important to learn how to keep safe from harming yourself.    Recognize the warning signs:  · Abrupt changes in personality, positive or negative- including increase in energy   · Giving away possessions  · Change in eating patterns- significant weight changes-  positive or negative  · Change in sleeping patterns- unable to sleep or sleeping all the time   · Unwillingness or inability to communicate  · Depression  · Unusual sadness, discouragement and loneliness  · Talk of wanting to die  · Neglect of personal appearance   · Rebelliousness- reckless behavior  · Withdrawal from people/activities they love  · Confusion- inability to concentrate     If you or a loved one observes any of these behaviors or has concerns about self-harm, here's what you can do:  · Talk about it- your feelings and reasons for harming yourself  · Remove any means that you might use to hurt yourself (examples: pills,  rope, extension cords, firearm)  · Get professional help from the community (Mental Health, Substance Abuse, psychological counseling)  · Do not be alone:Call your Safe Contact- someone whom you trust who will be there for you.  · Call your local CRISIS HOTLINE 175-4296 or 229-487-0446  · Call your local Children's Mobile Crisis Response Team [place] (790) 722-1815 or www.[facility]  · Call the toll free National Suicide Prevention Hotlines   · National Suicide Prevention Lifeline 529-515-LAJI (3564)  · [employer] [place] [facility] Line Network 800-SUICIDE (285-6644)              ACTIVITY: Rest and take it easy for the first 24 hours.  A responsible adult is recommended to remain with you during that time.  It is normal to feel sleepy.  We encourage you to not do anything that requires balance, judgment or coordination.    MILD FLU-LIKE SYMPTOMS ARE NORMAL. YOU MAY EXPERIENCE GENERALIZED MUSCLE ACHES, THROAT IRRITATION, HEADACHE AND/OR SOME NAUSEA.    FOR 24 HOURS DO NOT:  Drive, operate machinery or run household appliances.  Drink beer or alcoholic beverages.   Make important decisions or sign legal documents.    SPECIAL INSTRUCTIONS:   Clears diet, advance as tolerated to regular.  Follow-up with Dr. Rolle in 3-4 weeks  Activity: No restrictions  Cystoscopy, Care After  Refer to this sheet in the next few weeks. These instructions provide you with information on caring for yourself after your procedure. Your caregiver may also give you more specific instructions. Your treatment has been planned according to current medical practices, but problems sometimes occur. Call your caregiver if you have any problems or questions after your procedure.  HOME CARE INSTRUCTIONS   Things you can do to ease any discomfort after your procedure include:  · Drinking enough water and fluids to keep your urine clear or pale yellow.  · Taking a warm bath to relieve any burning feelings.  SEEK IMMEDIATE MEDICAL CARE IF:   · You have an  increase in blood in your urine.  · You notice blood clots in your urine.  · You have difficulty passing urine.  · You have the chills.  · You have abdominal pain.  · You have a fever or persistent symptoms for more than 2-3 days.  · You have a fever and your symptoms suddenly get worse.  MAKE SURE YOU:   · Understand these instructions.  · Will watch your condition.  · Will get help right away if you are not doing well or get worse.     This information is not intended to replace advice given to you by your health care provider. Make sure you discuss any questions you have with your health care provider.       DIET: To avoid nausea, slowly advance diet as tolerated, avoiding spicy or greasy foods for the first day.  Add more substantial food to your diet according to your physician's instructions. INCREASE FLUIDS AND FIBER TO AVOID CONSTIPATION.    SURGICAL DRESSING/BATHING: no dressing, ok to shower post op day 1    FOLLOW-UP APPOINTMENT:  A follow-up appointment should be arranged with your doctor.  Call to schedule.    You should CALL YOUR PHYSICIAN if you develop:  Fever greater than 101 degrees F.  Pain not relieved by medication, or persistent nausea or vomiting.  Excessive bleeding (blood soaking through dressing) or unexpected drainage from the wound.  Extreme redness or swelling around the incision site, drainage of pus or foul smelling drainage.  Inability to urinate or empty your bladder within 8 hours.  Problems with breathing or chest pain.    You should call 911 if you develop problems with breathing or chest pain.  If you are unable to contact your doctor or surgical center, you should go to the nearest emergency room or urgent care center.  Physician's telephone #: Dr. Rolle [phone]    If any questions arise, call your doctor.  If your doctor is not available, please feel free to call the Surgical Center at [phone].  The Center is open Monday through Friday from 7AM to 7PM.  You can also call  the HEALTH HOTLINE open 24 hours/day, 7 days/week and speak to a nurse at [phone], or toll free at [phone].    A registered nurse may call you a few days after your surgery to see how you are doing after your procedure.    MEDICATIONS: Resume taking daily medication.  Take prescribed pain medication with food.  If no medication is prescribed, you may take non-aspirin pain medication if needed.  PAIN MEDICATION CAN BE VERY CONSTIPATING.  Take a stool softener or laxative such as senokot, pericolace, or milk of magnesia if needed.    Prescription given to patient's family.  Last pain medication given at 5:43 pm.    If your physician has prescribed pain medication that includes Acetaminophen (Tylenol), do not take additional Acetaminophen (Tylenol) while taking the prescribed medication.    Depression / Suicide Risk    As you are discharged from this [facility] facility, it is important to learn how to keep safe from harming yourself.    Recognize the warning signs:  · Abrupt changes in personality, positive or negative- including increase in energy   · Giving away possessions  · Change in eating patterns- significant weight changes-  positive or negative  · Change in sleeping patterns- unable to sleep or sleeping all the time   · Unwillingness or inability to communicate  · Depression  · Unusual sadness, discouragement and loneliness  · Talk of wanting to die  · Neglect of personal appearance   · Rebelliousness- reckless behavior  · Withdrawal from people/activities they love  · Confusion- inability to concentrate     If you or a loved one observes any of these behaviors or has concerns about self-harm, here's what you can do:  · Talk about it- your feelings and reasons for harming yourself  · Remove any means that you might use to hurt yourself (examples: pills, rope, extension cords, firearm)  · Get professional help from the community (Mental Health, Substance Abuse, psychological counseling)  · Do  not be alone:Call your Safe Contact- someone whom you trust who will be there for you.  · Call your local CRISIS HOTLINE 614-3677 or 544-292-3899  · Call your local Children's Mobile Crisis Response Team [place] (749) 977-1858 or www.[employer]  · Call the toll free National Suicide Prevention Hotlines   · National Suicide Prevention Lifeline 642-167-GHVE (2104)  · National [employer] Line Network 800-SUICIDE (594-8500)

## 2018-05-24 NOTE — PROGRESS NOTES
Pt admitted to CDU from PACU for pain/nausea management. Per MAR, pt recently medicated for pain with norco, pt still exhibiting some discomfort. Endorses nausea, had small episode of emesis upon admission, denied need for zofran states she will let RN know if she needs. Oriented to unit, plan of care, and call light. Expressed understanding.

## 2018-05-24 NOTE — OR NURSING
"Call to Dr. Rolle for admitting orders-pt nausea and pain not fully controlled.  Pt states \"I just don't feel right\".  "

## 2018-05-24 NOTE — DISCHARGE SUMMARY
CHIEF COMPLAINT ON ADMISSION  No chief complaint on file.  Right proximal ureteral stone    CODE STATUS  No Order    HPI & HOSPITAL COURSE  This is a 58 y.o. female here with Right proximal ureteral stone.  She underwent right flexible ureteroscopy with laser lithotripsy of stone pushed back into the right renal unit on 5/23/18.  She was experiencing nausea after surgery but states that has resolved.  She denies vomiting, fever or chills.  No CP, SOB or HA.    Therefore, she is discharged in good and stable condition with close outpatient follow-up.    SPECIFIC OUTPATIENT FOLLOW-UP      DISCHARGE PROBLEM LIST  Active Problems:    Right ureteral stone POA: Yes  Resolved Problems:    * No resolved hospital problems. *      FOLLOW UP  Dr Ruffin office will contact patient to arrange f/u in 3-6 weeks, outpatient.      MEDICATIONS ON DISCHARGE   Majo Milner   Home Medication Instructions MIGUEL:[id number]    Printed on:05/24/18 1012   Medication Information                      aspirin (ASA) 81 MG Chew Tab chewable tablet  Take 81 mg by mouth every day.             Cholecalciferol (VITAMIN D) 400 UNIT Tab  Take 400 Units by mouth every morning.             HYDROCORTISONE, TOPICAL, 2 % Lotion  Apply  to affected area(s) as needed.             ibuprofen (MOTRIN) 800 MG TABS  Take 800 mg by mouth every 8 hours as needed.             levothyroxine (SYNTHROID) 137 MCG Tab  Take 137 mcg by mouth Every morning on an empty stomach.             pantoprazole (PROTONIX) 40 MG TBEC  Take 40 mg by mouth 2 times a day.             sertraline (ZOLOFT) 100 MG TABS  Take 150 mg by mouth every morning.             triamcinolone acetonide (KENALOG) 0.1 % Cream  Apply  to affected area(s) as needed.             VITAMIN B COMPLEX-C PO  Take 1 Tab by mouth every morning.                 DIET  Orders Placed This Encounter   Procedures   • DIET ORDER     Standing Status:   Standing     Number of Occurrences:   1     Order Specific Question:    Diet:     Answer:   Regular [1]       ACTIVITY  normal        CONSULTATIONS  none    PROCEDURES  1.  Rigid cystourethroscopy.  2.  Right flexible ureteroscopy with laser lithotripsy of stone pushed back   into the right renal unit.      LABORATORY  Lab Results   Component Value Date/Time    SODIUM 139 05/18/2018 01:38 PM    POTASSIUM 3.7 05/18/2018 01:38 PM    CHLORIDE 103 05/18/2018 01:38 PM    CO2 30 05/18/2018 01:38 PM    GLUCOSE 81 05/18/2018 01:38 PM    BUN 14 05/18/2018 01:38 PM    CREATININE 0.77 05/18/2018 01:38 PM        Lab Results   Component Value Date/Time    WBC 5.5 05/18/2018 01:38 PM    HEMOGLOBIN 14.3 05/18/2018 01:38 PM    HEMATOCRIT 42.5 05/18/2018 01:38 PM    PLATELETCT 245 05/18/2018 01:38 PM        Total time of the discharge process exceeds 31 minutes

## 2018-05-24 NOTE — OR NURSING
Pt to recovery, sleeping, airway dc'd on arrival. Medicated per MAR for reported discomfort and mild nausea. At this time pt tolerating small sips of clears. Discomfort localized to R flank. VSS. Discharge Rx's provided to pt's sister by Dr. Rolle

## 2018-06-22 ENCOUNTER — HOSPITAL ENCOUNTER (OUTPATIENT)
Dept: RADIOLOGY | Facility: MEDICAL CENTER | Age: 58
End: 2018-06-22
Attending: UROLOGY
Payer: COMMERCIAL

## 2018-06-22 DIAGNOSIS — N20.0 CALCULUS OF KIDNEY: ICD-10-CM

## 2018-06-22 PROCEDURE — 74018 RADEX ABDOMEN 1 VIEW: CPT

## 2018-07-17 ENCOUNTER — OFFICE VISIT (OUTPATIENT)
Dept: URGENT CARE | Facility: CLINIC | Age: 58
End: 2018-07-17
Payer: COMMERCIAL

## 2018-07-17 ENCOUNTER — HOSPITAL ENCOUNTER (OUTPATIENT)
Facility: MEDICAL CENTER | Age: 58
End: 2018-07-17
Attending: PHYSICIAN ASSISTANT
Payer: COMMERCIAL

## 2018-07-17 VITALS
DIASTOLIC BLOOD PRESSURE: 82 MMHG | HEIGHT: 65 IN | BODY MASS INDEX: 36.99 KG/M2 | HEART RATE: 83 BPM | WEIGHT: 222 LBS | SYSTOLIC BLOOD PRESSURE: 132 MMHG | TEMPERATURE: 99 F | OXYGEN SATURATION: 96 % | RESPIRATION RATE: 16 BRPM

## 2018-07-17 DIAGNOSIS — Z86.14 HISTORY OF MRSA INFECTION: ICD-10-CM

## 2018-07-17 DIAGNOSIS — E66.9 OBESITY (BMI 35.0-39.9 WITHOUT COMORBIDITY): ICD-10-CM

## 2018-07-17 DIAGNOSIS — L03.221 CELLULITIS, NECK: Primary | ICD-10-CM

## 2018-07-17 PROCEDURE — 87070 CULTURE OTHR SPECIMN AEROBIC: CPT

## 2018-07-17 PROCEDURE — 87077 CULTURE AEROBIC IDENTIFY: CPT

## 2018-07-17 PROCEDURE — 99204 OFFICE O/P NEW MOD 45 MIN: CPT | Mod: 25 | Performed by: PHYSICIAN ASSISTANT

## 2018-07-17 PROCEDURE — 87205 SMEAR GRAM STAIN: CPT

## 2018-07-17 PROCEDURE — 87186 SC STD MICRODIL/AGAR DIL: CPT

## 2018-07-17 RX ORDER — CLINDAMYCIN HYDROCHLORIDE 300 MG/1
300 CAPSULE ORAL 3 TIMES DAILY
Qty: 30 CAP | Refills: 0 | Status: SHIPPED | OUTPATIENT
Start: 2018-07-17 | End: 2022-04-12

## 2018-07-18 DIAGNOSIS — Z86.14 HISTORY OF MRSA INFECTION: ICD-10-CM

## 2018-07-18 DIAGNOSIS — L03.221 CELLULITIS, NECK: ICD-10-CM

## 2018-07-18 PROBLEM — E66.9 OBESITY (BMI 35.0-39.9 WITHOUT COMORBIDITY): Status: ACTIVE | Noted: 2018-07-18

## 2018-07-18 LAB
GRAM STN SPEC: NORMAL
SIGNIFICANT IND 70042: NORMAL
SITE SITE: NORMAL
SOURCE SOURCE: NORMAL

## 2018-07-18 NOTE — PROGRESS NOTES
Subjective:      PT is a 58 y.o. female who presents with Abscess (possable mersa on Rt side neck x 4 days )            HPI  Pt notes abscess on right side of neck with redness and white drainage x 4 days. Pt notes it started as a pimple she picked and grew. Pt notes hx of MRSA. Pt has not taken any Rx medications for this condition. Pt states the pain is a 4-5/10, aching in nature and worse at night. Pt denies CP, SOB, NVD, paresthesias, headaches, dizziness, change in vision, hives, or other joint pain. The pt's medication list, problem list, and allergies have been evaluated and reviewed during today's visit.    PMH:  Past Medical History:   Diagnosis Date   • Anesthesia     nausea and vomiting   • Arrhythmia     occas ectopic beat   • Dental disorder     upper flipper   • GERD (gastroesophageal reflux disease)    • Hashimoto's disease    • Heart burn    • Heart murmur    • History of MRSA infection 2016    head and armpit    • IBS (irritable bowel syndrome)    • Indigestion    • Kidney calculi     kidney stones   • Other specified disorder of intestines     IBS   • Sleep apnea     no cpap since losing 60 pounds    • Snoring        PSH:  Past Surgical History:   Procedure Laterality Date   • CYSTOSCOPY STENT PLACEMENT  5/23/2018    Procedure: CYSTOSCOPY;  Surgeon: Yifan Perdomo M.D.;  Location: [facility];  Service: Urology   • URETEROSCOPY Right 5/23/2018    Procedure: URETEROSCOPY;  Surgeon: Yifan Perdomo M.D.;  Location: [facility];  Service: Urology   • LASERTRIPSY Right 5/23/2018    Procedure: LASERTRIPSY- LITHO;  Surgeon: Yifan Perdomo M.D.;  Location: [facility];  Service: Urology   • JIMMIE BY LAPAROSCOPY  7/17/2013    Performed by John Carrasco M.D. at [facility]   • CYSTOSCOPY STENT PLACEMENT  9/29/2011    Performed by YIFAN PERDOMO at [facility]   • URETEROSCOPY  9/29/2011    Performed by YIFAN PERDOMO at [facility]   •  LASERTRIPSY  2011    Performed by YIFAN PERDOMO at [facility]   • LITHOTRIPSY         Fam Hx:  the patient's family history is not pertinent to their current complaint    Family Status   Relation Status   • Mother Alive   • Father     kidney failure        Soc HX:  Social History     Social History   • Marital status:      Spouse name: N/A   • Number of children: N/A   • Years of education: N/A     Occupational History   • Not on file.     Social History Main Topics   • Smoking status: Never Smoker   • Smokeless tobacco: Never Used   • Alcohol use Yes      Comment: 6 PER YEAR    • Drug use: No   • Sexual activity: Not on file     Other Topics Concern   • Not on file     Social History Narrative   • No narrative on file         Medications:    Current Outpatient Prescriptions:   •  clindamycin (CLEOCIN) 300 MG Cap, Take 1 Cap by mouth 3 times a day., Disp: 30 Cap, Rfl: 0  •  aspirin (ASA) 81 MG Chew Tab chewable tablet, Take 81 mg by mouth every day., Disp: , Rfl:   •  levothyroxine (SYNTHROID) 137 MCG Tab, Take 137 mcg by mouth Every morning on an empty stomach., Disp: , Rfl:   •  VITAMIN B COMPLEX-C PO, Take 1 Tab by mouth every morning., Disp: , Rfl:   •  Cholecalciferol (VITAMIN D) 400 UNIT Tab, Take 400 Units by mouth every morning., Disp: , Rfl:   •  sertraline (ZOLOFT) 100 MG TABS, Take 150 mg by mouth every morning., Disp: , Rfl:   •  pantoprazole (PROTONIX) 40 MG TBEC, Take 40 mg by mouth 2 times a day., Disp: , Rfl:   •  triamcinolone acetonide (KENALOG) 0.1 % Cream, Apply  to affected area(s) as needed., Disp: , Rfl:   •  HYDROCORTISONE, TOPICAL, 2 % Lotion, Apply  to affected area(s) as needed., Disp: , Rfl:   •  ibuprofen (MOTRIN) 800 MG TABS, Take 800 mg by mouth every 8 hours as needed., Disp: , Rfl:       Allergies:  Bactrim; Bee; and Ampicillin    ROS  Constitutional: Negative for fever, chills and malaise/fatigue.   HENT: Negative for congestion and sore throat.     "  Eyes: Negative for blurred vision, double vision and photophobia.   Respiratory: Negative for cough and shortness of breath.    Cardiovascular: Negative for chest pain and palpitations.   Gastrointestinal: Negative for heartburn, nausea, vomiting, abdominal pain, diarrhea and constipation.   Genitourinary: Negative for dysuria and flank pain.   Musculoskeletal: Negative for joint pain and myalgias.   Skin: +abscess of neck  Neurological: Negative for dizziness, tingling and headaches.   Endo/Heme/Allergies: Does not bruise/bleed easily.   Psychiatric/Behavioral: Negative for depression. The patient is not nervous/anxious.           Objective:     /82   Pulse 83   Temp 37.2 °C (99 °F)   Resp 16   Ht 1.651 m (5' 5\")   Wt 100.7 kg (222 lb)   LMP 04/15/2013   SpO2 96%   BMI 36.94 kg/m²      Physical Exam   Skin: Skin is warm. Capillary refill takes less than 2 seconds. No abrasion, no bruising, no burn, no ecchymosis, no laceration, no lesion, no petechiae and no rash noted. There is erythema. No cyanosis. Nails show no clubbing.               Constitutional: PT is oriented to person, place, and time. PT appears well-developed and well-nourished. No distress.   HENT:   Head: Normocephalic and atraumatic.   Mouth/Throat: Oropharynx is clear and moist. No oropharyngeal exudate.   Eyes: Conjunctivae normal and EOM are normal. Pupils are equal, round, and reactive to light.   Neck: Normal range of motion. Neck supple. No thyromegaly present.   Cardiovascular: Normal rate, regular rhythm, normal heart sounds and intact distal pulses.  Exam reveals no gallop and no friction rub.    No murmur heard.  Pulmonary/Chest: Effort normal and breath sounds normal. No respiratory distress. PT has no wheezes. PT has no rales. Pt exhibits no tenderness.   Abdominal: Soft. Bowel sounds are normal. PT exhibits no distension and no mass. There is no tenderness. There is no rebound and no guarding.   Musculoskeletal: Normal " range of motion. PT exhibits no edema and no tenderness.   Neurological: PT is alert and oriented to person, place, and time. PT has normal reflexes. No cranial nerve deficit.       Psychiatric: PT has a normal mood and affect. PT behavior is normal. Judgment and thought content normal.        Assessment/Plan:     1. Cellulitis, neck    - cefTRIAXone (ROCEPHIN) 1 g, lidocaine (XYLOCAINE) 1 % 3.6 mL for IM use; 1 g by Intramuscular route Once.  - clindamycin (CLEOCIN) 300 MG Cap; Take 1 Cap by mouth 3 times a day.  Dispense: 30 Cap; Refill: 0  - CULTURE WOUND W/ GRAM STAIN; Future    2. History of MRSA infection    - CULTURE WOUND W/ GRAM STAIN; Future    3. Obesity (BMI 35.0-39.9 without comorbidity)    - Patient identified as having weight management issue.  Appropriate orders and counseling given.    Wound cleansed and dressed in clinic  Rest, fluids encouraged.  AVS with medical info given.  Pt was in full understanding and agreement with the plan.  Follow-up as needed if symptoms worsen or fail to improve.

## 2018-07-18 NOTE — PATIENT INSTRUCTIONS
"MRSA FAQs  What is MRSA?  Staphylococcus aureus (pronounced tjxab-ttq-sb-JAMES-us AW-ree-us), or \"Staph\" is a very common germ that about 1 out of every 3 people have on their skin or in their nose. This germ does not cause any problems for most people who have it on their skin. But sometimes it can cause serious infections such as skin or wound infections, pneumonia, or infections of the blood.  Antibiotics are given to kill Staph germs when they cause infections. Some Staph are resistant, meaning they cannot be killed by some antibiotics. \"Methicillin-resistant Staphylococcus aureus\" or \"MRSA\" is a type of Staph that is resistant to some of the antibiotics that are often used to treat Staph infections.  Who is most likely to get an MRSA infection?  In the hospital, people who are more likely to get an MRSA infection are people who:  · have other health conditions making them sick  · have been in the hospital or a nursing home  · have been treated with antibiotics.  People who are healthy and who have not been in the hospital or a nursing home can also get MRSA infections. These infections usually involve the skin. More information about this type of MRSA infection, known as \"community-associated MRSA\" infection, is available from the Centers for Disease Control and Prevention (CDC). http://www.cdc.gov/mrsa  How do I get an MRSA infection?  People who have MRSA germs on their skin or who are infected with MRSA may be able to spread the germ to other people. MRSA can be passed on to bed linens, bed rails, bathroom fixtures, and medical equipment. It can spread to other people on contaminated equipment and on the hands of doctors, nurses, other healthcare providers and visitors.  Can MRSA infections be treated?  Yes, there are antibiotics that can kill MRSA germs. Some patients with MRSA abscesses may need surgery to drain the infection. Your healthcare provider will determine which treatments are best for you.  What " are some of the things that hospitals are doing to prevent MRSA infections?  To prevent MRSA infections, doctors, nurses and other healthcare providers:  · Clean their hands with soap and water or an alcohol-based hand rub before and after caring for every patient.  · Carefully clean hospital rooms and medical equipment.  · Use Contact Precautions when caring for patients with MRSA. Contact Precautions mean:  ¨ Whenever possible, patients with MRSA will have a single room or will share a room only with someone else who also has MRSA.  ¨ Healthcare providers will put on gloves and wear a gown over their clothing while taking care of patients with MRSA.  ¨ Visitors may also be asked to wear a gown and gloves.  ¨ When leaving the room, hospital providers and visitors remove their gown and gloves and clean their hands.  ¨ Patients on Contact Precautions are asked to stay in their hospital rooms as much as possible. They should not go to common areas, such as the gift shop or cafeteria. They may go to other areas of the hospital for treatments and tests.  · May test some patients to see if they have MRSA on their skin. This test involves rubbing a cotton-tipped swab in the patient's nostrils or on the skin.  What can I do to help prevent MRSA infections?  In the hospital  · Make sure that all doctors, nurses, and other healthcare providers clean their hands with soap and water or an alcohol-based hand rub before and after caring for you.  If you do not see your providers clean their hands, please ask them to do so.  When you go home  · If you have wounds or an intravascular device (such as a catheter or dialysis port) make sure that you know how to take care of them.  Can my friends and family get MRSA when they visit me?  The chance of getting MRSA while visiting a person who has MRSA is very low. To decrease the chance of getting MRSA your family and friends should:  · Clean their hands before they enter your room and  when they leave.  · Ask a healthcare provider if they need to wear protective gowns and gloves when they visit you.  What do I need to do when I go home from the hospital?  To prevent another MRSA infection and to prevent spreading MRSA to others:  · Keep taking any antibiotics prescribed by your doctor. Don't take half-doses or stop before you complete your prescribed course.  · Clean your hands often, especially before and after changing your wound dressing or bandage.  · People who live with you should clean their hands often as well.  · Keep any wounds clean and change bandages as instructed until healed.  · Avoid sharing personal items such as towels or razors.  · Wash and dry your clothes and bed linens in the warmest temperatures recommended on the labels.  · Tell your healthcare providers that you have MRSA. This includes home health nurses and aides, therapists, and personnel in doctors' offices.  · Your doctor may have more instructions for you.  If you have questions, please ask your doctor or nurse.   Developed and co-sponsored by The Society for Healthcare Epidemiology of [place] (SHEA); Infectious Diseases Society of [place] (IDSA); American Hospital Association; Association for Professionals in Infection Control and Epidemiology (APIC); Centers for Disease Control and Prevention (CDC); and The Joint Commission.   This information is not intended to replace advice given to you by your health care provider. Make sure you discuss any questions you have with your health care provider.  Document Released: 12/23/2014 Document Revised: 05/25/2017 Document Reviewed: 03/02/2016  [employer] Interactive Patient Education © 2017 [employer]

## 2018-07-20 ENCOUNTER — TELEPHONE (OUTPATIENT)
Dept: URGENT CARE | Facility: CLINIC | Age: 58
End: 2018-07-20

## 2018-07-20 LAB
BACTERIA WND AEROBE CULT: ABNORMAL
BACTERIA WND AEROBE CULT: ABNORMAL
GRAM STN SPEC: ABNORMAL
SIGNIFICANT IND 70042: ABNORMAL
SITE SITE: ABNORMAL
SOURCE SOURCE: ABNORMAL

## 2018-07-21 NOTE — TELEPHONE ENCOUNTER
Called and left message with pt about wound culture results which came back positive for MRSA.   I told her she could continue the abx therapy with a positive urine culture which was sensitive to the abx she was placed on.  Encouraged Pt to call back with questions.  Can Tavarez PA-C

## 2018-09-21 ENCOUNTER — HOSPITAL ENCOUNTER (OUTPATIENT)
Dept: LAB | Facility: MEDICAL CENTER | Age: 58
End: 2018-09-21
Payer: COMMERCIAL

## 2018-09-21 ENCOUNTER — HOSPITAL ENCOUNTER (OUTPATIENT)
Dept: LAB | Facility: MEDICAL CENTER | Age: 58
End: 2018-09-21
Attending: FAMILY MEDICINE
Payer: COMMERCIAL

## 2018-09-21 LAB
BDY FAT % MEASURED: 43.5 %
BP DIAS: 73 MMHG
BP SYS: 133 MMHG
CHOLEST SERPL-MCNC: 253 MG/DL (ref 100–199)
DIABETES HTDIA: NO
EST. AVERAGE GLUCOSE BLD GHB EST-MCNC: 123 MG/DL
EVENT NAME HTEVT: NORMAL
FASTING HTFAS: YES
FASTING STATUS PATIENT QL REPORTED: NORMAL
GLUCOSE SERPL-MCNC: 99 MG/DL (ref 65–99)
HBA1C MFR BLD: 5.9 % (ref 0–5.6)
HDLC SERPL-MCNC: 45 MG/DL
HYPERTENSION HTHYP: NO
LDLC SERPL CALC-MCNC: 174 MG/DL
SCREENING LOC CITY HTCIT: NORMAL
SCREENING LOC STATE HTSTA: NORMAL
SCREENING LOCATION HTLOC: NORMAL
SMOKING HTSMO: NO
SUBSCRIBER ID HTSID: NORMAL
TRIGL SERPL-MCNC: 169 MG/DL (ref 0–149)
TSH SERPL DL<=0.005 MIU/L-ACNC: 1.5 UIU/ML (ref 0.38–5.33)

## 2018-09-21 PROCEDURE — 84443 ASSAY THYROID STIM HORMONE: CPT

## 2018-09-21 PROCEDURE — S5190 WELLNESS ASSESSMENT BY NONPH: HCPCS

## 2018-09-21 PROCEDURE — 36415 COLL VENOUS BLD VENIPUNCTURE: CPT

## 2018-09-21 PROCEDURE — 83036 HEMOGLOBIN GLYCOSYLATED A1C: CPT

## 2018-09-21 PROCEDURE — 80061 LIPID PANEL: CPT

## 2018-09-21 PROCEDURE — 82947 ASSAY GLUCOSE BLOOD QUANT: CPT

## 2018-09-24 ENCOUNTER — IMMUNIZATION (OUTPATIENT)
Dept: OCCUPATIONAL MEDICINE | Facility: CLINIC | Age: 58
End: 2018-09-24

## 2018-09-24 DIAGNOSIS — Z23 NEED FOR VACCINATION: ICD-10-CM

## 2018-09-30 PROCEDURE — 90686 IIV4 VACC NO PRSV 0.5 ML IM: CPT | Performed by: PREVENTIVE MEDICINE

## 2019-03-27 ENCOUNTER — HOSPITAL ENCOUNTER (OUTPATIENT)
Dept: LAB | Facility: MEDICAL CENTER | Age: 59
End: 2019-03-27
Attending: FAMILY MEDICINE
Payer: COMMERCIAL

## 2019-03-27 LAB
ALBUMIN SERPL BCP-MCNC: 4.2 G/DL (ref 3.2–4.9)
ALBUMIN/GLOB SERPL: 1.4 G/DL
ALP SERPL-CCNC: 30 U/L (ref 30–99)
ALT SERPL-CCNC: 21 U/L (ref 2–50)
ANION GAP SERPL CALC-SCNC: 6 MMOL/L (ref 0–11.9)
AST SERPL-CCNC: 17 U/L (ref 12–45)
BASOPHILS # BLD AUTO: 2.2 % (ref 0–1.8)
BASOPHILS # BLD: 0.1 K/UL (ref 0–0.12)
BILIRUB SERPL-MCNC: 0.4 MG/DL (ref 0.1–1.5)
BUN SERPL-MCNC: 16 MG/DL (ref 8–22)
CALCIUM SERPL-MCNC: 9.6 MG/DL (ref 8.5–10.5)
CHLORIDE SERPL-SCNC: 106 MMOL/L (ref 96–112)
CHOLEST SERPL-MCNC: 216 MG/DL (ref 100–199)
CO2 SERPL-SCNC: 30 MMOL/L (ref 20–33)
CREAT SERPL-MCNC: 0.89 MG/DL (ref 0.5–1.4)
EOSINOPHIL # BLD AUTO: 0.15 K/UL (ref 0–0.51)
EOSINOPHIL NFR BLD: 3.3 % (ref 0–6.9)
ERYTHROCYTE [DISTWIDTH] IN BLOOD BY AUTOMATED COUNT: 45.6 FL (ref 35.9–50)
FASTING STATUS PATIENT QL REPORTED: NORMAL
GLOBULIN SER CALC-MCNC: 3 G/DL (ref 1.9–3.5)
GLUCOSE SERPL-MCNC: 85 MG/DL (ref 65–99)
HCT VFR BLD AUTO: 45.8 % (ref 37–47)
HDLC SERPL-MCNC: 41 MG/DL
HGB BLD-MCNC: 14.9 G/DL (ref 12–16)
IMM GRANULOCYTES # BLD AUTO: 0.01 K/UL (ref 0–0.11)
IMM GRANULOCYTES NFR BLD AUTO: 0.2 % (ref 0–0.9)
LDLC SERPL CALC-MCNC: 142 MG/DL
LYMPHOCYTES # BLD AUTO: 1.32 K/UL (ref 1–4.8)
LYMPHOCYTES NFR BLD: 29.2 % (ref 22–41)
MCH RBC QN AUTO: 30.3 PG (ref 27–33)
MCHC RBC AUTO-ENTMCNC: 32.5 G/DL (ref 33.6–35)
MCV RBC AUTO: 93.1 FL (ref 81.4–97.8)
MONOCYTES # BLD AUTO: 0.45 K/UL (ref 0–0.85)
MONOCYTES NFR BLD AUTO: 10 % (ref 0–13.4)
NEUTROPHILS # BLD AUTO: 2.49 K/UL (ref 2–7.15)
NEUTROPHILS NFR BLD: 55.1 % (ref 44–72)
NRBC # BLD AUTO: 0 K/UL
NRBC BLD-RTO: 0 /100 WBC
PLATELET # BLD AUTO: 217 K/UL (ref 164–446)
PMV BLD AUTO: 12.4 FL (ref 9–12.9)
POTASSIUM SERPL-SCNC: 3.9 MMOL/L (ref 3.6–5.5)
PROT SERPL-MCNC: 7.2 G/DL (ref 6–8.2)
RBC # BLD AUTO: 4.92 M/UL (ref 4.2–5.4)
SODIUM SERPL-SCNC: 142 MMOL/L (ref 135–145)
TRIGL SERPL-MCNC: 167 MG/DL (ref 0–149)
TSH SERPL DL<=0.005 MIU/L-ACNC: 1.93 UIU/ML (ref 0.38–5.33)
WBC # BLD AUTO: 4.5 K/UL (ref 4.8–10.8)

## 2019-03-27 PROCEDURE — 84443 ASSAY THYROID STIM HORMONE: CPT

## 2019-03-27 PROCEDURE — 80053 COMPREHEN METABOLIC PANEL: CPT

## 2019-03-27 PROCEDURE — 36415 COLL VENOUS BLD VENIPUNCTURE: CPT

## 2019-03-27 PROCEDURE — 80061 LIPID PANEL: CPT

## 2019-03-27 PROCEDURE — 83036 HEMOGLOBIN GLYCOSYLATED A1C: CPT

## 2019-03-27 PROCEDURE — 85025 COMPLETE CBC W/AUTO DIFF WBC: CPT

## 2019-03-29 LAB
EST. AVERAGE GLUCOSE BLD GHB EST-MCNC: 120 MG/DL
HBA1C MFR BLD: 5.8 % (ref 0–5.6)

## 2019-07-11 ENCOUNTER — HOSPITAL ENCOUNTER (OUTPATIENT)
Dept: RADIOLOGY | Facility: MEDICAL CENTER | Age: 59
End: 2019-07-11
Attending: UROLOGY
Payer: COMMERCIAL

## 2019-07-11 DIAGNOSIS — N20.1 CALCULUS OF URETER: ICD-10-CM

## 2019-07-11 PROCEDURE — 74018 RADEX ABDOMEN 1 VIEW: CPT

## 2019-08-02 ENCOUNTER — HOSPITAL ENCOUNTER (OUTPATIENT)
Dept: RADIOLOGY | Facility: MEDICAL CENTER | Age: 59
End: 2019-08-02
Attending: PHYSICIAN ASSISTANT
Payer: COMMERCIAL

## 2019-08-02 DIAGNOSIS — N20.0 KIDNEY STONE: ICD-10-CM

## 2019-08-02 PROCEDURE — 76775 US EXAM ABDO BACK WALL LIM: CPT

## 2019-09-12 ENCOUNTER — HOSPITAL ENCOUNTER (OUTPATIENT)
Dept: LAB | Facility: MEDICAL CENTER | Age: 59
End: 2019-09-12
Payer: COMMERCIAL

## 2019-09-12 LAB
BDY FAT % MEASURED: 40.5 %
BP DIAS: 83 MMHG
BP SYS: 133 MMHG
CHOLEST SERPL-MCNC: 216 MG/DL (ref 100–199)
DIABETES HTDIA: NO
EVENT NAME HTEVT: NORMAL
FASTING HTFAS: YES
GLUCOSE SERPL-MCNC: 79 MG/DL (ref 65–99)
HDLC SERPL-MCNC: 37 MG/DL
HYPERTENSION HTHYP: NO
LDLC SERPL CALC-MCNC: 130 MG/DL
SCREENING LOC CITY HTCIT: NORMAL
SCREENING LOC STATE HTSTA: NORMAL
SCREENING LOCATION HTLOC: NORMAL
SMOKING HTSMO: NO
SUBSCRIBER ID HTSID: NORMAL
TRIGL SERPL-MCNC: 243 MG/DL (ref 0–149)

## 2019-09-12 PROCEDURE — 36415 COLL VENOUS BLD VENIPUNCTURE: CPT

## 2019-09-12 PROCEDURE — 82947 ASSAY GLUCOSE BLOOD QUANT: CPT

## 2019-09-12 PROCEDURE — S5190 WELLNESS ASSESSMENT BY NONPH: HCPCS

## 2019-09-12 PROCEDURE — 80061 LIPID PANEL: CPT

## 2019-09-24 ENCOUNTER — IMMUNIZATION (OUTPATIENT)
Dept: OCCUPATIONAL MEDICINE | Facility: CLINIC | Age: 59
End: 2019-09-24

## 2019-09-24 DIAGNOSIS — Z23 NEED FOR VACCINATION: ICD-10-CM

## 2019-09-24 PROCEDURE — 90686 IIV4 VACC NO PRSV 0.5 ML IM: CPT | Performed by: PREVENTIVE MEDICINE

## 2019-09-28 ENCOUNTER — HOSPITAL ENCOUNTER (OUTPATIENT)
Facility: MEDICAL CENTER | Age: 59
End: 2019-09-28
Attending: NURSE PRACTITIONER
Payer: COMMERCIAL

## 2019-09-28 ENCOUNTER — OFFICE VISIT (OUTPATIENT)
Dept: URGENT CARE | Facility: PHYSICIAN GROUP | Age: 59
End: 2019-09-28
Payer: COMMERCIAL

## 2019-09-28 VITALS
OXYGEN SATURATION: 99 % | RESPIRATION RATE: 16 BRPM | HEART RATE: 62 BPM | TEMPERATURE: 98 F | DIASTOLIC BLOOD PRESSURE: 76 MMHG | BODY MASS INDEX: 31.66 KG/M2 | WEIGHT: 197 LBS | SYSTOLIC BLOOD PRESSURE: 118 MMHG | HEIGHT: 66 IN

## 2019-09-28 DIAGNOSIS — N39.0 URINARY TRACT INFECTION WITHOUT HEMATURIA, SITE UNSPECIFIED: ICD-10-CM

## 2019-09-28 DIAGNOSIS — Z91.030 HISTORY OF BEE STING ALLERGY: ICD-10-CM

## 2019-09-28 DIAGNOSIS — Z76.0 MEDICATION REFILL: ICD-10-CM

## 2019-09-28 LAB
APPEARANCE UR: CLEAR
BILIRUB UR STRIP-MCNC: NEGATIVE MG/DL
COLOR UR AUTO: NORMAL
GLUCOSE UR STRIP.AUTO-MCNC: NEGATIVE MG/DL
KETONES UR STRIP.AUTO-MCNC: NORMAL MG/DL
LEUKOCYTE ESTERASE UR QL STRIP.AUTO: NORMAL
NITRITE UR QL STRIP.AUTO: POSITIVE
PH UR STRIP.AUTO: 5.5 [PH] (ref 5–8)
PROT UR QL STRIP: NEGATIVE MG/DL
RBC UR QL AUTO: NEGATIVE
SP GR UR STRIP.AUTO: >1.03
UROBILINOGEN UR STRIP-MCNC: NORMAL MG/DL

## 2019-09-28 PROCEDURE — 87086 URINE CULTURE/COLONY COUNT: CPT

## 2019-09-28 PROCEDURE — 81002 URINALYSIS NONAUTO W/O SCOPE: CPT | Performed by: NURSE PRACTITIONER

## 2019-09-28 PROCEDURE — 99214 OFFICE O/P EST MOD 30 MIN: CPT | Performed by: NURSE PRACTITIONER

## 2019-09-28 RX ORDER — EPINEPHRINE 0.3 MG/.3ML
0.3 INJECTION SUBCUTANEOUS ONCE
Qty: 0.3 ML | Refills: 0 | Status: SHIPPED | OUTPATIENT
Start: 2019-09-28 | End: 2019-09-28

## 2019-09-28 RX ORDER — CEPHALEXIN 500 MG/1
500 CAPSULE ORAL 2 TIMES DAILY
Qty: 14 CAP | Refills: 0 | Status: SHIPPED | OUTPATIENT
Start: 2019-09-28 | End: 2019-10-05

## 2019-09-28 ASSESSMENT — ENCOUNTER SYMPTOMS
TINGLING: 0
FEVER: 0
RESPIRATORY NEGATIVE: 1
SENSORY CHANGE: 0
CHILLS: 1
VOMITING: 0
ABDOMINAL PAIN: 1
FLANK PAIN: 1
DIZZINESS: 1
CARDIOVASCULAR NEGATIVE: 1
SHORTNESS OF BREATH: 0
NAUSEA: 0
FOCAL WEAKNESS: 0
BACK PAIN: 0

## 2019-09-28 NOTE — PROGRESS NOTES
Subjective:     Majo Milner is a 59 y.o. female who presents for Urinary Frequency (odor, abd pain, dizziness, chills, fever x 4days)       Urinary Frequency   This is a new problem. Episode onset: 4 days ago. The problem has been gradually worsening. Associated symptoms include abdominal pain, chills and urinary symptoms. Pertinent negatives include no chest pain, fever, nausea or vomiting. Treatments tried: increased fluids.     Patient also reports a history of being allergies.  Requesting a refill for her EpiPen.    PMH:  has a past medical history of Anesthesia, Arrhythmia, Dental disorder, GERD (gastroesophageal reflux disease), Hashimoto's disease, Heart burn, Heart murmur, History of MRSA infection (2016), IBS (irritable bowel syndrome), Indigestion, Kidney calculi, Other specified disorder of intestines, Sleep apnea, and Snoring. She also has no past medical history of Hypothyroid.    MEDS:   Current Outpatient Medications:   •  cephALEXin (KEFLEX) 500 MG Cap, Take 1 Cap by mouth 2 times a day for 7 days., Disp: 14 Cap, Rfl: 0  •  EPINEPHrine (EPIPEN) 0.3 MG/0.3ML Solution Auto-injector solution for injection, 0.3 mL by Intramuscular route Once for 1 dose., Disp: 0.3 mL, Rfl: 0  •  clindamycin (CLEOCIN) 300 MG Cap, Take 1 Cap by mouth 3 times a day., Disp: 30 Cap, Rfl: 0  •  triamcinolone acetonide (KENALOG) 0.1 % Cream, Apply  to affected area(s) as needed., Disp: , Rfl:   •  HYDROCORTISONE, TOPICAL, 2 % Lotion, Apply  to affected area(s) as needed., Disp: , Rfl:   •  aspirin (ASA) 81 MG Chew Tab chewable tablet, Take 81 mg by mouth every day., Disp: , Rfl:   •  levothyroxine (SYNTHROID) 137 MCG Tab, Take 137 mcg by mouth Every morning on an empty stomach., Disp: , Rfl:   •  VITAMIN B COMPLEX-C PO, Take 1 Tab by mouth every morning., Disp: , Rfl:   •  Cholecalciferol (VITAMIN D) 400 UNIT Tab, Take 400 Units by mouth every morning., Disp: , Rfl:   •  sertraline (ZOLOFT) 100 MG TABS, Take 150 mg by  "mouth every morning., Disp: , Rfl:   •  pantoprazole (PROTONIX) 40 MG TBEC, Take 40 mg by mouth 2 times a day., Disp: , Rfl:   •  ibuprofen (MOTRIN) 800 MG TABS, Take 800 mg by mouth every 8 hours as needed., Disp: , Rfl:     ALLERGIES:   Allergies   Allergen Reactions   • Bactrim      \"blisters inside of mouth and throat\"   • Bee Swelling   • Ampicillin Rash     SURGHX:   Past Surgical History:   Procedure Laterality Date   • CYSTOSCOPY STENT PLACEMENT  5/23/2018    Procedure: CYSTOSCOPY;  Surgeon: Matteo Perdomo M.D.;  Location: [facility];  Service: Urology   • URETEROSCOPY Right 5/23/2018    Procedure: URETEROSCOPY;  Surgeon: Matteo Perdomo M.D.;  Location: [facility];  Service: Urology   • LASERTRIPSY Right 5/23/2018    Procedure: LASERTRIPSY- LITHO;  Surgeon: Matteo Perdomo M.D.;  Location: [facility];  Service: Urology   • JIMMIE BY LAPAROSCOPY  7/17/2013    Performed by John Carrasco M.D. at [facility]   • CYSTOSCOPY STENT PLACEMENT  9/29/2011    Performed by MATTEO PERDOMO at [facility]   • URETEROSCOPY  9/29/2011    Performed by MATTEO PERDOMO at [facility]   • LASERTRIPSY  9/29/2011    Performed by MATTEO PERDOMO at [facility]   • LITHOTRIPSY       SOCHX:  reports that she has never smoked. She has never used smokeless tobacco. She reports that she drinks alcohol. She reports that she does not use drugs.     FH: Reviewed with patient, not pertinent to this visit.    Review of Systems   Constitutional: Positive for chills. Negative for fever and malaise/fatigue.   Respiratory: Negative.  Negative for shortness of breath.    Cardiovascular: Negative.  Negative for chest pain.   Gastrointestinal: Positive for abdominal pain. Negative for nausea and vomiting.   Genitourinary: Positive for flank pain, frequency and urgency. Negative for dysuria.   Musculoskeletal: Negative for back pain.   Neurological: Positive for dizziness. " "Negative for tingling, sensory change and focal weakness.   All other systems reviewed and are negative.    Objective:     /76 (BP Location: Left arm, Patient Position: Sitting, BP Cuff Size: Adult)   Pulse 62   Temp 36.7 °C (98 °F) (Temporal)   Resp 16   Ht 1.664 m (5' 5.5\")   Wt 89.4 kg (197 lb)   LMP 04/15/2013   SpO2 99%   BMI 32.28 kg/m²     Physical Exam   Constitutional: She is oriented to person, place, and time. She appears well-developed and well-nourished. She is cooperative.  Non-toxic appearance. No distress.   HENT:   Head: Normocephalic.   Right Ear: External ear normal.   Left Ear: External ear normal.   Nose: Nose normal.   Eyes: Conjunctivae and EOM are normal.   Neck: Normal range of motion.   Cardiovascular: Normal rate and intact distal pulses.   Pulmonary/Chest: Effort normal. No respiratory distress.   Abdominal: Soft. She exhibits no distension. There is tenderness in the suprapubic area. There is no CVA tenderness.   Musculoskeletal: Normal range of motion. She exhibits no deformity.   Neurological: She is alert and oriented to person, place, and time. She has normal strength. No sensory deficit. Coordination normal.   Skin: Skin is warm and dry. She is not diaphoretic. No pallor.   Psychiatric: She has a normal mood and affect. Her behavior is normal.   Vitals reviewed.       Assessment/Plan:     1. Urinary tract infection without hematuria, site unspecified  - cephALEXin (KEFLEX) 500 MG Cap; Take 1 Cap by mouth 2 times a day for 7 days.  Dispense: 14 Cap; Refill: 0  - URINE CULTURE(NEW); Future  - POCT Urinalysis    2. History of bee sting allergy  - EPINEPHrine (EPIPEN) 0.3 MG/0.3ML Solution Auto-injector solution for injection; 0.3 mL by Intramuscular route Once for 1 dose.  Dispense: 0.3 mL; Refill: 0    3. Medication refill  - EPINEPHrine (EPIPEN) 0.3 MG/0.3ML Solution Auto-injector solution for injection; 0.3 mL by Intramuscular route Once for 1 dose.  Dispense: 0.3 mL; " Refill: 0    UA positive nitrites and LE. Urine culture pending.    Rx as above sent electronically.    Discussed close monitoring and supportive measures including increasing fluids and rest as well as OTC symptom management including acetaminophen and/or ibuprofen PRN pain and/or fever.    Patient advised to: Return for 1) Symptoms don't improve or worsen, or go to ER, 2) Follow up with primary care in 7-10 days.    Differential diagnosis, natural history, supportive care, and indications for immediate follow-up discussed. All questions answered. Patient agrees with the plan of care.

## 2019-09-29 DIAGNOSIS — N39.0 URINARY TRACT INFECTION WITHOUT HEMATURIA, SITE UNSPECIFIED: ICD-10-CM

## 2019-10-01 LAB
BACTERIA UR CULT: NORMAL
SIGNIFICANT IND 70042: NORMAL
SITE SITE: NORMAL
SOURCE SOURCE: NORMAL

## 2019-11-15 ENCOUNTER — HOSPITAL ENCOUNTER (OUTPATIENT)
Dept: RADIOLOGY | Facility: MEDICAL CENTER | Age: 59
End: 2019-11-15
Attending: PHYSICIAN ASSISTANT
Payer: COMMERCIAL

## 2019-11-15 DIAGNOSIS — N13.30 HYDRONEPHROSIS, UNSPECIFIED HYDRONEPHROSIS TYPE: ICD-10-CM

## 2019-11-15 PROCEDURE — 74176 CT ABD & PELVIS W/O CONTRAST: CPT

## 2020-03-10 ENCOUNTER — HOSPITAL ENCOUNTER (OUTPATIENT)
Dept: LAB | Facility: MEDICAL CENTER | Age: 60
End: 2020-03-10
Attending: PHYSICIAN ASSISTANT
Payer: COMMERCIAL

## 2020-03-10 PROCEDURE — 87086 URINE CULTURE/COLONY COUNT: CPT

## 2020-03-13 LAB
BACTERIA UR CULT: NORMAL
SIGNIFICANT IND 70042: NORMAL
SITE SITE: NORMAL
SOURCE SOURCE: NORMAL

## 2020-04-17 ENCOUNTER — HOSPITAL ENCOUNTER (OUTPATIENT)
Dept: RADIOLOGY | Facility: MEDICAL CENTER | Age: 60
End: 2020-04-17
Attending: PHYSICIAN ASSISTANT
Payer: COMMERCIAL

## 2020-04-17 DIAGNOSIS — N20.0 CALCULUS OF KIDNEY: ICD-10-CM

## 2020-04-17 PROCEDURE — 74176 CT ABD & PELVIS W/O CONTRAST: CPT

## 2020-06-12 ENCOUNTER — HOSPITAL ENCOUNTER (OUTPATIENT)
Dept: RADIOLOGY | Facility: MEDICAL CENTER | Age: 60
End: 2020-06-12
Attending: FAMILY MEDICINE
Payer: COMMERCIAL

## 2020-06-12 DIAGNOSIS — Z12.31 VISIT FOR SCREENING MAMMOGRAM: ICD-10-CM

## 2020-06-12 PROCEDURE — 77067 SCR MAMMO BI INCL CAD: CPT

## 2020-06-19 ENCOUNTER — HOSPITAL ENCOUNTER (OUTPATIENT)
Dept: LAB | Facility: MEDICAL CENTER | Age: 60
End: 2020-06-19
Attending: PHYSICIAN ASSISTANT
Payer: COMMERCIAL

## 2020-06-19 LAB
ANION GAP SERPL CALC-SCNC: 10 MMOL/L (ref 7–16)
BUN SERPL-MCNC: 14 MG/DL (ref 8–22)
CALCIUM SERPL-MCNC: 9.6 MG/DL (ref 8.5–10.5)
CHLORIDE SERPL-SCNC: 107 MMOL/L (ref 96–112)
CO2 SERPL-SCNC: 26 MMOL/L (ref 20–33)
CREAT SERPL-MCNC: 0.82 MG/DL (ref 0.5–1.4)
GLUCOSE SERPL-MCNC: 82 MG/DL (ref 65–99)
POTASSIUM SERPL-SCNC: 4.1 MMOL/L (ref 3.6–5.5)
SODIUM SERPL-SCNC: 143 MMOL/L (ref 135–145)
URATE SERPL-MCNC: 3.4 MG/DL (ref 1.9–8.2)

## 2020-06-19 PROCEDURE — 83970 ASSAY OF PARATHORMONE: CPT

## 2020-06-19 PROCEDURE — 80048 BASIC METABOLIC PNL TOTAL CA: CPT

## 2020-06-19 PROCEDURE — 84550 ASSAY OF BLOOD/URIC ACID: CPT

## 2020-06-19 PROCEDURE — 84443 ASSAY THYROID STIM HORMONE: CPT

## 2020-06-19 PROCEDURE — 84439 ASSAY OF FREE THYROXINE: CPT

## 2020-06-19 PROCEDURE — 82365 CALCULUS SPECTROSCOPY: CPT

## 2020-06-20 LAB
PTH-INTACT SERPL-MCNC: 21.4 PG/ML (ref 14–72)
T4 FREE SERPL-MCNC: 1.73 NG/DL (ref 0.93–1.7)
TSH SERPL DL<=0.005 MIU/L-ACNC: 0.58 UIU/ML (ref 0.38–5.33)

## 2020-06-24 LAB
APPEARANCE STONE: NORMAL
COMPN STONE: NORMAL
NUMBER STONE: NORMAL
SIZE STONE: NORMAL MM
SPECIMEN WT: 152 MG

## 2020-09-11 ENCOUNTER — HOSPITAL ENCOUNTER (OUTPATIENT)
Dept: LAB | Facility: MEDICAL CENTER | Age: 60
End: 2020-09-11
Payer: COMMERCIAL

## 2020-09-11 LAB
BDY FAT % MEASURED: 33.7 %
BP DIAS: 84 MMHG
BP SYS: 153 MMHG
CHOLEST SERPL-MCNC: 219 MG/DL (ref 100–199)
DIABETES HTDIA: NO
EVENT NAME HTEVT: NORMAL
FASTING HTFAS: YES
GLUCOSE SERPL-MCNC: 89 MG/DL (ref 65–99)
HDLC SERPL-MCNC: 36 MG/DL
HYPERTENSION HTHYP: NO
LDLC SERPL CALC-MCNC: 148 MG/DL
SCREENING LOC CITY HTCIT: NORMAL
SCREENING LOC STATE HTSTA: NORMAL
SCREENING LOCATION HTLOC: NORMAL
SMOKING HTSMO: NO
SUBSCRIBER ID HTSID: NORMAL
TRIGL SERPL-MCNC: 176 MG/DL (ref 0–149)

## 2020-09-11 PROCEDURE — 80061 LIPID PANEL: CPT

## 2020-09-11 PROCEDURE — S5190 WELLNESS ASSESSMENT BY NONPH: HCPCS

## 2020-09-11 PROCEDURE — 36415 COLL VENOUS BLD VENIPUNCTURE: CPT

## 2020-09-11 PROCEDURE — 82947 ASSAY GLUCOSE BLOOD QUANT: CPT

## 2020-09-21 ENCOUNTER — IMMUNIZATION (OUTPATIENT)
Dept: OCCUPATIONAL MEDICINE | Facility: CLINIC | Age: 60
End: 2020-09-21

## 2020-09-21 DIAGNOSIS — Z23 NEED FOR VACCINATION: ICD-10-CM

## 2020-09-21 PROCEDURE — 90686 IIV4 VACC NO PRSV 0.5 ML IM: CPT | Performed by: PREVENTIVE MEDICINE

## 2020-12-20 ENCOUNTER — IMMUNIZATION (OUTPATIENT)
Dept: FAMILY PLANNING/WOMEN'S HEALTH CLINIC | Facility: IMMUNIZATION CENTER | Age: 60
End: 2020-12-20
Payer: COMMERCIAL

## 2020-12-20 DIAGNOSIS — Z23 ENCOUNTER FOR VACCINATION: Primary | ICD-10-CM

## 2020-12-20 DIAGNOSIS — Z23 NEED FOR VACCINATION: ICD-10-CM

## 2020-12-20 PROCEDURE — 0001A PFIZER SARS-COV-2 VACCINE: CPT

## 2020-12-20 PROCEDURE — 91300 PFIZER SARS-COV-2 VACCINE: CPT

## 2021-01-11 ENCOUNTER — IMMUNIZATION (OUTPATIENT)
Dept: FAMILY PLANNING/WOMEN'S HEALTH CLINIC | Facility: IMMUNIZATION CENTER | Age: 61
End: 2021-01-11
Attending: FAMILY MEDICINE
Payer: COMMERCIAL

## 2021-01-11 DIAGNOSIS — Z23 ENCOUNTER FOR VACCINATION: Primary | ICD-10-CM

## 2021-01-11 PROCEDURE — 0002A PFIZER SARS-COV-2 VACCINE: CPT

## 2021-01-11 PROCEDURE — 91300 PFIZER SARS-COV-2 VACCINE: CPT

## 2021-01-28 ENCOUNTER — OFFICE VISIT (OUTPATIENT)
Dept: URGENT CARE | Facility: PHYSICIAN GROUP | Age: 61
End: 2021-01-28
Payer: COMMERCIAL

## 2021-01-28 VITALS
BODY MASS INDEX: 31.79 KG/M2 | HEIGHT: 66 IN | SYSTOLIC BLOOD PRESSURE: 126 MMHG | HEART RATE: 72 BPM | DIASTOLIC BLOOD PRESSURE: 84 MMHG | OXYGEN SATURATION: 96 % | WEIGHT: 197.8 LBS | TEMPERATURE: 97.8 F

## 2021-01-28 DIAGNOSIS — H66.002 NON-RECURRENT ACUTE SUPPURATIVE OTITIS MEDIA OF LEFT EAR WITHOUT SPONTANEOUS RUPTURE OF TYMPANIC MEMBRANE: ICD-10-CM

## 2021-01-28 DIAGNOSIS — J01.90 ACUTE NON-RECURRENT SINUSITIS, UNSPECIFIED LOCATION: ICD-10-CM

## 2021-01-28 DIAGNOSIS — K13.79 RECURRENT MOUTH ULCERATION: ICD-10-CM

## 2021-01-28 PROCEDURE — 99213 OFFICE O/P EST LOW 20 MIN: CPT | Performed by: NURSE PRACTITIONER

## 2021-01-28 RX ORDER — DEXAMETHASONE 0.5 MG/5ML
2 ELIXIR ORAL EVERY 12 HOURS PRN
Qty: 350 ML | Refills: 0 | Status: SHIPPED | OUTPATIENT
Start: 2021-01-28 | End: 2022-11-18

## 2021-01-28 RX ORDER — CEFDINIR 300 MG/1
300 CAPSULE ORAL 2 TIMES DAILY
Qty: 14 CAP | Refills: 0 | Status: SHIPPED | OUTPATIENT
Start: 2021-01-28 | End: 2021-01-28

## 2021-01-28 RX ORDER — FLUTICASONE PROPIONATE 50 MCG
SPRAY, SUSPENSION (ML) NASAL
Qty: 9.1 ML | Refills: 0 | Status: SHIPPED | OUTPATIENT
Start: 2021-01-28 | End: 2022-04-12

## 2021-01-28 RX ORDER — AMOXICILLIN AND CLAVULANATE POTASSIUM 875; 125 MG/1; MG/1
1 TABLET, FILM COATED ORAL 2 TIMES DAILY
Qty: 14 TAB | Refills: 0 | Status: SHIPPED | OUTPATIENT
Start: 2021-01-28 | End: 2021-02-04

## 2021-01-28 ASSESSMENT — ENCOUNTER SYMPTOMS
INSOMNIA: 1
RHINORRHEA: 1
SINUS PRESSURE: 1
COUGH: 0
FEVER: 0
RESPIRATORY NEGATIVE: 1
CARDIOVASCULAR NEGATIVE: 1
SHORTNESS OF BREATH: 0

## 2021-01-28 ASSESSMENT — VISUAL ACUITY: OU: 1

## 2021-01-28 ASSESSMENT — FIBROSIS 4 INDEX: FIB4 SCORE: 1.04

## 2021-01-28 NOTE — PATIENT INSTRUCTIONS
Otitis Media, Adult    Otitis media occurs when there is inflammation and fluid in the middle ear. Your middle ear is a part of the ear that contains bones for hearing as well as air that helps send sounds to your brain.  What are the causes?  This condition is caused by a blockage in the eustachian tube. This tube drains fluid from the ear to the back of the nose (nasopharynx). A blockage in this tube can be caused by an object or by swelling (edema) in the tube. Problems that can cause a blockage include:  · A cold or other upper respiratory infection.  · Allergies.  · An irritant, such as tobacco smoke.  · Enlarged adenoids. The adenoids are areas of soft tissue located high in the back of the throat, behind the nose and the roof of the mouth.  · A mass in the nasopharynx.  · Damage to the ear caused by pressure changes (barotrauma).  What are the signs or symptoms?  Symptoms of this condition include:  · Ear pain.  · A fever.  · Decreased hearing.  · A headache.  · Tiredness (lethargy).  · Fluid leaking from the ear.  · Ringing in the ear.  How is this diagnosed?  This condition is diagnosed with a physical exam. During the exam your health care provider will use an instrument called an otoscope to look into your ear and check for redness, swelling, and fluid. He or she will also ask about your symptoms.  Your health care provider may also order tests, such as:  · A test to check the movement of the eardrum (pneumatic otoscopy). This test is done by squeezing a small amount of air into the ear.  · A test that changes air pressure in the middle ear to check how well the eardrum moves and whether the eustachian tube is working (tympanogram).  How is this treated?  This condition usually goes away on its own within 3-5 days. But if the condition is caused by a bacteria infection and does not go away own its own, or keeps coming back, your health care provider may:  · Prescribe antibiotic medicines to treat the  infection.  · Prescribe or recommend medicines to control pain.  Follow these instructions at home:  · Take over-the-counter and prescription medicines only as told by your health care provider.  · If you were prescribed an antibiotic medicine, take it as told by your health care provider. Do not stop taking the antibiotic even if you start to feel better.  · Keep all follow-up visits as told by your health care provider. This is important.  Contact a health care provider if:  · You have bleeding from your nose.  · There is a lump on your neck.  · You are not getting better in 5 days.  · You feel worse instead of better.  Get help right away if:  · You have severe pain that is not controlled with medicine.  · You have swelling, redness, or pain around your ear.  · You have stiffness in your neck.  · A part of your face is paralyzed.  · The bone behind your ear (mastoid) is tender when you touch it.  · You develop a severe headache.  Summary  · Otitis media is redness, soreness, and swelling of the middle ear.  · This condition usually goes away on its own within 3-5 days.  · If the problem does not go away in 3-5 days, your health care provider may prescribe or recommend medicines to treat your symptoms.  · If you were prescribed an antibiotic medicine, take it as told by your health care provider.  This information is not intended to replace advice given to you by your health care provider. Make sure you discuss any questions you have with your health care provider.  Document Released: 09/22/2005 Document Revised: 11/30/2018 Document Reviewed: 12/08/2017  Elsevier Patient Education © 2020 [employer]        Sinusitis, Adult  Sinusitis is inflammation of your sinuses. Sinuses are hollow spaces in the bones around your face. Your sinuses are located:  · Around your eyes.  · In the middle of your forehead.  · Behind your nose.  · In your cheekbones.  Mucus normally drains out of your sinuses. When your nasal tissues  become inflamed or swollen, mucus can become trapped or blocked. This allows bacteria, viruses, and fungi to grow, which leads to infection. Most infections of the sinuses are caused by a virus.  Sinusitis can develop quickly. It can last for up to 4 weeks (acute) or for more than 12 weeks (chronic). Sinusitis often develops after a cold.  What are the causes?  This condition is caused by anything that creates swelling in the sinuses or stops mucus from draining. This includes:  · Allergies.  · Asthma.  · Infection from bacteria or viruses.  · Deformities or blockages in your nose or sinuses.  · Abnormal growths in the nose (nasal polyps).  · Pollutants, such as chemicals or irritants in the air.  · Infection from fungi (rare).  What increases the risk?  You are more likely to develop this condition if you:  · Have a weak body defense system (immune system).  · Do a lot of swimming or diving.  · Overuse nasal sprays.  · Smoke.  What are the signs or symptoms?  The main symptoms of this condition are pain and a feeling of pressure around the affected sinuses. Other symptoms include:  · Stuffy nose or congestion.  · Thick drainage from your nose.  · Swelling and warmth over the affected sinuses.  · Headache.  · Upper toothache.  · A cough that may get worse at night.  · Extra mucus that collects in the throat or the back of the nose (postnasal drip).  · Decreased sense of smell and taste.  · Fatigue.  · A fever.  · Sore throat.  · Bad breath.  How is this diagnosed?  This condition is diagnosed based on:  · Your symptoms.  · Your medical history.  · A physical exam.  · Tests to find out if your condition is acute or chronic. This may include:  ? Checking your nose for nasal polyps.  ? Viewing your sinuses using a device that has a light (endoscope).  ? Testing for allergies or bacteria.  ? Imaging tests, such as an MRI or CT scan.  In rare cases, a bone biopsy may be done to rule out more serious types of fungal sinus  disease.  How is this treated?  Treatment for sinusitis depends on the cause and whether your condition is chronic or acute.  · If caused by a virus, your symptoms should go away on their own within 10 days. You may be given medicines to relieve symptoms. They include:  ? Medicines that shrink swollen nasal passages (topical intranasal decongestants).  ? Medicines that treat allergies (antihistamines).  ? A spray that eases inflammation of the nostrils (topical intranasal corticosteroids).  ? Rinses that help get rid of thick mucus in your nose (nasal saline washes).  · If caused by bacteria, your health care provider may recommend waiting to see if your symptoms improve. Most bacterial infections will get better without antibiotic medicine. You may be given antibiotics if you have:  ? A severe infection.  ? A weak immune system.  · If caused by narrow nasal passages or nasal polyps, you may need to have surgery.  Follow these instructions at home:  Medicines  · Take, use, or apply over-the-counter and prescription medicines only as told by your health care provider. These may include nasal sprays.  · If you were prescribed an antibiotic medicine, take it as told by your health care provider. Do not stop taking the antibiotic even if you start to feel better.  Hydrate and humidify    · Drink enough fluid to keep your urine pale yellow. Staying hydrated will help to thin your mucus.  · Use a cool mist humidifier to keep the humidity level in your home above 50%.  · Inhale steam for 10-15 minutes, 3-4 times a day, or as told by your health care provider. You can do this in the bathroom while a hot shower is running.  · Limit your exposure to cool or dry air.  Rest  · Rest as much as possible.  · Sleep with your head raised (elevated).  · Make sure you get enough sleep each night.  General instructions    · Apply a warm, moist washcloth to your face 3-4 times a day or as told by your health care provider. This will  help with discomfort.  · Wash your hands often with soap and water to reduce your exposure to germs. If soap and water are not available, use hand .  · Do not smoke. Avoid being around people who are smoking (secondhand smoke).  · Keep all follow-up visits as told by your health care provider. This is important.  Contact a health care provider if:  · You have a fever.  · Your symptoms get worse.  · Your symptoms do not improve within 10 days.  Get help right away if:  · You have a severe headache.  · You have persistent vomiting.  · You have severe pain or swelling around your face or eyes.  · You have vision problems.  · You develop confusion.  · Your neck is stiff.  · You have trouble breathing.  Summary  · Sinusitis is soreness and inflammation of your sinuses. Sinuses are hollow spaces in the bones around your face.  · This condition is caused by nasal tissues that become inflamed or swollen. The swelling traps or blocks the flow of mucus. This allows bacteria, viruses, and fungi to grow, which leads to infection.  · If you were prescribed an antibiotic medicine, take it as told by your health care provider. Do not stop taking the antibiotic even if you start to feel better.  · Keep all follow-up visits as told by your health care provider. This is important.  This information is not intended to replace advice given to you by your health care provider. Make sure you discuss any questions you have with your health care provider.  Document Released: 12/18/2006 Document Revised: 05/20/2019 Document Reviewed: 05/20/2019  Elsevier Patient Education © 2020 [employer]

## 2021-01-28 NOTE — PROGRESS NOTES
Subjective:     Majo Milner is a 60 y.o. female who presents for Ear Pain (Left ear. Started two weeks ago. Noticed blood this morning. ), Mouth Ulcer (Started two weeks ago. ), and Insomnia       Otalgia   There is pain in the left ear. This is a new problem. Episode onset: 2 weeks ago. The problem has been gradually worsening. The pain is mild. Associated symptoms include ear discharge, hearing loss and rhinorrhea. Pertinent negatives include no coughing.   Sinusitis  This is a new problem. The problem has been gradually worsening since onset. Associated symptoms include congestion, ear pain and sinus pressure. Pertinent negatives include no coughing or shortness of breath. (Discharge)     Patient also reporting recurrent mouth sores. Reports they usually flare up when she is ill. She attributes this to her history of Hashimoto's. Has been taking dexamethasone elixir which she has been prescribed in the past which helps.    Patient was screened prior to rooming and denied COVID-19 diagnosis or contact with a person who has been diagnosed or is suspected to have COVID-19. During this visit, appropriate PPE was worn, hand hygiene was performed, and the patient and any visitors were masked.     PMH:  has a past medical history of Anesthesia, Arrhythmia, Dental disorder, GERD (gastroesophageal reflux disease), Hashimoto's disease, Heart burn, Heart murmur, History of MRSA infection (2016), IBS (irritable bowel syndrome), Indigestion, Kidney calculi, Other specified disorder of intestines, Sleep apnea, and Snoring. She also has no past medical history of Hypothyroid.    MEDS:   Current Outpatient Medications:   •  fluticasone (FLONASE) 50 MCG/ACT nasal spray, 2 sprays in each nostril once a day, Disp: 9.1 mL, Rfl: 0  •  dexamethasone (DECADRON) 0.5 MG/5ML Elixir, Take 20 mL by mouth every 12 hours as needed., Disp: 350 mL, Rfl: 0  •  amoxicillin-clavulanate (AUGMENTIN) 875-125 MG Tab, Take 1 Tab by mouth 2 times  "a day for 7 days., Disp: 14 Tab, Rfl: 0  •  triamcinolone acetonide (KENALOG) 0.1 % Cream, Apply  to affected area(s) as needed., Disp: , Rfl:   •  aspirin (ASA) 81 MG Chew Tab chewable tablet, Take 81 mg by mouth every day., Disp: , Rfl:   •  levothyroxine (SYNTHROID) 137 MCG Tab, Take 137 mcg by mouth Every morning on an empty stomach., Disp: , Rfl:   •  VITAMIN B COMPLEX-C PO, Take 1 Tab by mouth every morning., Disp: , Rfl:   •  Cholecalciferol (VITAMIN D) 400 UNIT Tab, Take 400 Units by mouth every morning., Disp: , Rfl:   •  sertraline (ZOLOFT) 100 MG TABS, Take 150 mg by mouth every morning., Disp: , Rfl:   •  pantoprazole (PROTONIX) 40 MG TBEC, Take 40 mg by mouth 2 times a day., Disp: , Rfl:   •  ibuprofen (MOTRIN) 800 MG TABS, Take 800 mg by mouth every 8 hours as needed., Disp: , Rfl:   •  clindamycin (CLEOCIN) 300 MG Cap, Take 1 Cap by mouth 3 times a day., Disp: 30 Cap, Rfl: 0  •  HYDROCORTISONE, TOPICAL, 2 % Lotion, Apply  to affected area(s) as needed., Disp: , Rfl:     ALLERGIES:   Allergies   Allergen Reactions   • Bactrim      \"blisters inside of mouth and throat\"   • Bee Swelling   • Ampicillin Rash     SURGHX:   Past Surgical History:   Procedure Laterality Date   • CYSTOSCOPY STENT PLACEMENT  5/23/2018    Procedure: CYSTOSCOPY;  Surgeon: Matteo Perdomo M.D.;  Location: [facility];  Service: Urology   • URETEROSCOPY Right 5/23/2018    Procedure: URETEROSCOPY;  Surgeon: Matteo Perdomo M.D.;  Location: [facility]ERY [facility];  Service: Urology   • LASERTRIPSY Right 5/23/2018    Procedure: LASERTRIPSY- LITHO;  Surgeon: Matteo Perdomo M.D.;  Location: [facility];  Service: Urology   • JIMMIE BY LAPAROSCOPY  7/17/2013    Performed by John Carrasco M.D. at [facility]   • CYSTOSCOPY STENT PLACEMENT  9/29/2011    Performed by MATTEO PERDOMO at SURGERY [facility]   • URETEROSCOPY  9/29/2011    Performed by MATTEO PERDOMO at SURGERY [facility]   • LASERTRIPSY  " "9/29/2011    Performed by YIFAN PERDOMO at SURGERY [facility]   • LITHOTRIPSY       SOCHX:  reports that she has never smoked. She has never used smokeless tobacco. She reports current alcohol use. She reports that she does not use drugs.     FH: Reviewed with patient, not pertinent to this visit.    Review of Systems   Constitutional: Positive for malaise/fatigue. Negative for fever.   HENT: Positive for congestion, ear discharge, ear pain, hearing loss, rhinorrhea and sinus pressure.         Mouth sores   Respiratory: Negative.  Negative for cough and shortness of breath.    Cardiovascular: Negative.    Psychiatric/Behavioral: The patient has insomnia.    All other systems reviewed and are negative.    Additional details per HPI.      Objective:     /84 (BP Location: Left arm, Patient Position: Sitting, BP Cuff Size: Adult)   Pulse 72   Temp 36.6 °C (97.8 °F) (Temporal)   Ht 1.676 m (5' 6\")   Wt 89.7 kg (197 lb 12.8 oz)   LMP 04/15/2013   SpO2 96%   BMI 31.93 kg/m²     Physical Exam  Vitals signs reviewed.   Constitutional:       General: She is not in acute distress.     Appearance: She is well-developed. She is not ill-appearing or toxic-appearing.   HENT:      Head: Normocephalic.      Right Ear: Tympanic membrane and external ear normal.      Left Ear: External ear normal. Drainage (Purulent) present. A middle ear effusion is present. Tympanic membrane is bulging. Tympanic membrane is not perforated.      Nose: Congestion present.      Right Sinus: Maxillary sinus tenderness and frontal sinus tenderness present.      Left Sinus: Maxillary sinus tenderness and frontal sinus tenderness present.      Mouth/Throat:      Mouth: Mucous membranes are moist. Oral lesions (Few, small red ulcerations around gums and palate) present.      Pharynx: Oropharynx is clear. Uvula midline.   Eyes:      General: Vision grossly intact.      Extraocular Movements: Extraocular movements intact.      " Conjunctiva/sclera: Conjunctivae normal.   Neck:      Musculoskeletal: Normal range of motion.   Cardiovascular:      Rate and Rhythm: Normal rate.   Pulmonary:      Effort: Pulmonary effort is normal. No respiratory distress.   Musculoskeletal: Normal range of motion.         General: No deformity.   Skin:     General: Skin is warm and dry.      Coloration: Skin is not pale.   Neurological:      Mental Status: She is alert and oriented to person, place, and time.      Sensory: No sensory deficit.      Motor: No weakness.      Coordination: Coordination normal.   Psychiatric:         Behavior: Behavior normal. Behavior is cooperative.       Assessment/Plan:     1. Non-recurrent acute suppurative otitis media of left ear without spontaneous rupture of tympanic membrane  - amoxicillin-clavulanate (AUGMENTIN) 875-125 MG Tab; Take 1 Tab by mouth 2 times a day for 7 days.  Dispense: 14 Tab; Refill: 0    2. Acute non-recurrent sinusitis, unspecified location  - fluticasone (FLONASE) 50 MCG/ACT nasal spray; 2 sprays in each nostril once a day  Dispense: 9.1 mL; Refill: 0  - amoxicillin-clavulanate (AUGMENTIN) 875-125 MG Tab; Take 1 Tab by mouth 2 times a day for 7 days.  Dispense: 14 Tab; Refill: 0    3. Recurrent mouth ulceration  - dexamethasone (DECADRON) 0.5 MG/5ML Elixir; Take 20 mL by mouth every 12 hours as needed.  Dispense: 350 mL; Refill: 0    Rx as above sent electronically.    Differential diagnosis, natural history, supportive care, sinus washes, over-the-counter symptom management per 's instructions, ibuprofen, APAP, close monitoring, and indications for immediate follow-up discussed.     Patient advised to: Return for 1) Symptoms that worsen/don't improve, or go to ER, 2) Follow up with primary care in 7-10 days.    All questions answered. Patient agrees with the plan of care.    Discharge summary provided.

## 2021-06-11 ENCOUNTER — HOSPITAL ENCOUNTER (OUTPATIENT)
Dept: LAB | Facility: MEDICAL CENTER | Age: 61
End: 2021-06-11
Attending: STUDENT IN AN ORGANIZED HEALTH CARE EDUCATION/TRAINING PROGRAM
Payer: COMMERCIAL

## 2021-06-11 LAB
ALBUMIN SERPL BCP-MCNC: 3.4 G/DL (ref 3.2–4.9)
ALBUMIN/GLOB SERPL: 1.3 G/DL
ALP SERPL-CCNC: 39 U/L (ref 30–99)
ALT SERPL-CCNC: 21 U/L (ref 2–50)
ANION GAP SERPL CALC-SCNC: 9 MMOL/L (ref 7–16)
AST SERPL-CCNC: 19 U/L (ref 12–45)
BASOPHILS # BLD AUTO: 0.7 % (ref 0–1.8)
BASOPHILS # BLD: 0.05 K/UL (ref 0–0.12)
BILIRUB SERPL-MCNC: 0.6 MG/DL (ref 0.1–1.5)
BUN SERPL-MCNC: 9 MG/DL (ref 8–22)
CALCIUM SERPL-MCNC: 8.8 MG/DL (ref 8.5–10.5)
CHLORIDE SERPL-SCNC: 108 MMOL/L (ref 96–112)
CO2 SERPL-SCNC: 26 MMOL/L (ref 20–33)
CREAT SERPL-MCNC: 0.73 MG/DL (ref 0.5–1.4)
EOSINOPHIL # BLD AUTO: 0.17 K/UL (ref 0–0.51)
EOSINOPHIL NFR BLD: 2.5 % (ref 0–6.9)
ERYTHROCYTE [DISTWIDTH] IN BLOOD BY AUTOMATED COUNT: 49 FL (ref 35.9–50)
GLOBULIN SER CALC-MCNC: 2.6 G/DL (ref 1.9–3.5)
GLUCOSE SERPL-MCNC: 114 MG/DL (ref 65–99)
HCT VFR BLD AUTO: 42.8 % (ref 37–47)
HGB BLD-MCNC: 13.6 G/DL (ref 12–16)
IMM GRANULOCYTES # BLD AUTO: 0.02 K/UL (ref 0–0.11)
IMM GRANULOCYTES NFR BLD AUTO: 0.3 % (ref 0–0.9)
LYMPHOCYTES # BLD AUTO: 1.09 K/UL (ref 1–4.8)
LYMPHOCYTES NFR BLD: 15.8 % (ref 22–41)
MCH RBC QN AUTO: 30.3 PG (ref 27–33)
MCHC RBC AUTO-ENTMCNC: 31.8 G/DL (ref 33.6–35)
MCV RBC AUTO: 95.3 FL (ref 81.4–97.8)
MONOCYTES # BLD AUTO: 0.5 K/UL (ref 0–0.85)
MONOCYTES NFR BLD AUTO: 7.3 % (ref 0–13.4)
NEUTROPHILS # BLD AUTO: 5.05 K/UL (ref 2–7.15)
NEUTROPHILS NFR BLD: 73.4 % (ref 44–72)
NRBC # BLD AUTO: 0 K/UL
NRBC BLD-RTO: 0 /100 WBC
PLATELET # BLD AUTO: 203 K/UL (ref 164–446)
PMV BLD AUTO: 12.8 FL (ref 9–12.9)
POTASSIUM SERPL-SCNC: 3.8 MMOL/L (ref 3.6–5.5)
PROT SERPL-MCNC: 6 G/DL (ref 6–8.2)
RBC # BLD AUTO: 4.49 M/UL (ref 4.2–5.4)
SODIUM SERPL-SCNC: 143 MMOL/L (ref 135–145)
TSH SERPL DL<=0.005 MIU/L-ACNC: 3.35 UIU/ML (ref 0.38–5.33)
WBC # BLD AUTO: 6.9 K/UL (ref 4.8–10.8)

## 2021-06-11 PROCEDURE — 36415 COLL VENOUS BLD VENIPUNCTURE: CPT

## 2021-06-11 PROCEDURE — 80053 COMPREHEN METABOLIC PANEL: CPT

## 2021-06-11 PROCEDURE — 84443 ASSAY THYROID STIM HORMONE: CPT

## 2021-06-11 PROCEDURE — 85025 COMPLETE CBC W/AUTO DIFF WBC: CPT

## 2021-11-15 ENCOUNTER — HOSPITAL ENCOUNTER (OUTPATIENT)
Dept: RADIOLOGY | Facility: MEDICAL CENTER | Age: 61
End: 2021-11-15
Attending: PHYSICIAN ASSISTANT
Payer: COMMERCIAL

## 2021-11-15 DIAGNOSIS — N20.0 CALCULUS OF KIDNEY: ICD-10-CM

## 2021-11-15 PROCEDURE — 76775 US EXAM ABDO BACK WALL LIM: CPT

## 2022-04-12 ENCOUNTER — OFFICE VISIT (OUTPATIENT)
Dept: URGENT CARE | Facility: PHYSICIAN GROUP | Age: 62
End: 2022-04-12
Payer: COMMERCIAL

## 2022-04-12 VITALS
DIASTOLIC BLOOD PRESSURE: 78 MMHG | HEIGHT: 65 IN | HEART RATE: 74 BPM | SYSTOLIC BLOOD PRESSURE: 132 MMHG | BODY MASS INDEX: 29.32 KG/M2 | TEMPERATURE: 98.4 F | OXYGEN SATURATION: 96 % | RESPIRATION RATE: 16 BRPM | WEIGHT: 176 LBS

## 2022-04-12 DIAGNOSIS — H65.01 NON-RECURRENT ACUTE SEROUS OTITIS MEDIA OF RIGHT EAR: ICD-10-CM

## 2022-04-12 DIAGNOSIS — J01.90 ACUTE NON-RECURRENT SINUSITIS, UNSPECIFIED LOCATION: ICD-10-CM

## 2022-04-12 PROCEDURE — 99214 OFFICE O/P EST MOD 30 MIN: CPT | Performed by: PHYSICIAN ASSISTANT

## 2022-04-12 RX ORDER — AMOXICILLIN AND CLAVULANATE POTASSIUM 875; 125 MG/1; MG/1
1 TABLET, FILM COATED ORAL 2 TIMES DAILY
Qty: 14 TABLET | Refills: 0 | Status: SHIPPED | OUTPATIENT
Start: 2022-04-12 | End: 2022-04-19

## 2022-04-12 RX ORDER — FLUTICASONE PROPIONATE 50 MCG
1 SPRAY, SUSPENSION (ML) NASAL DAILY
Qty: 16 G | Refills: 0 | Status: SHIPPED | OUTPATIENT
Start: 2022-04-12 | End: 2023-08-16

## 2022-04-12 ASSESSMENT — ENCOUNTER SYMPTOMS
NAUSEA: 0
HEADACHES: 1
FATIGUE: 0
VOMITING: 0
SORE THROAT: 0
CHILLS: 0
FEVER: 0
COUGH: 1
SINUS PAIN: 1

## 2022-04-12 ASSESSMENT — FIBROSIS 4 INDEX: FIB4 SCORE: 1.29

## 2022-04-12 NOTE — PROGRESS NOTES
Subjective:   Majo Milner is a 62 y.o. female who presents for Sinus Pain (X1wk, cough, colored phlegm, worse in morning, sinus congestion, eye and head pressure, sore stiff neck, post nasal drip)        Denies hx of seasonal allergies.  Not currently using flonase.    Sinus Pain  This is a new problem. The current episode started 1 to 4 weeks ago (mild sinus pressure x 5-6 days with sudden worsening of sx today). The problem occurs constantly. The problem has been rapidly worsening. Associated symptoms include congestion, coughing (productive cough in the morning.) and headaches. Pertinent negatives include no chills, fatigue, fever, nausea, sore throat or vomiting. Associated symptoms comments: Bilateral sinus pressure, bilateral ear pain/pressure. Nothing aggravates the symptoms. She has tried nothing for the symptoms. The treatment provided no relief.     Review of Systems   Constitutional: Negative for chills, fatigue and fever.   HENT: Positive for congestion and sinus pain. Negative for sore throat.    Respiratory: Positive for cough (productive cough in the morning.).    Gastrointestinal: Negative for nausea and vomiting.   Neurological: Positive for headaches.       PMH:  has a past medical history of Anesthesia, Arrhythmia, Dental disorder, GERD (gastroesophageal reflux disease), Hashimoto's disease, Heart burn, Heart murmur, History of MRSA infection (2016), IBS (irritable bowel syndrome), Indigestion, Kidney calculi, Other specified disorder of intestines, Sleep apnea, and Snoring.    She has no past medical history of Hypothyroid.  MEDS:   Current Outpatient Medications:   •  Probiotic Product (PROBIOTIC DAILY PO), Take  by mouth., Disp: , Rfl:   •  fluticasone (FLONASE) 50 MCG/ACT nasal spray, Administer 1 Spray into affected nostril(S) every day., Disp: 16 g, Rfl: 0  •  amoxicillin-clavulanate (AUGMENTIN) 875-125 MG Tab, Take 1 Tablet by mouth 2 times a day for 7 days., Disp: 14 Tablet, Rfl:  "0  •  triamcinolone acetonide (KENALOG) 0.1 % Cream, Apply  to affected area(s) as needed., Disp: , Rfl:   •  aspirin (ASA) 81 MG Chew Tab chewable tablet, Take 81 mg by mouth every day., Disp: , Rfl:   •  levothyroxine (SYNTHROID) 137 MCG Tab, Take 137 mcg by mouth Every morning on an empty stomach., Disp: , Rfl:   •  VITAMIN B COMPLEX-C PO, Take 1 Tab by mouth every morning., Disp: , Rfl:   •  Cholecalciferol (VITAMIN D) 400 UNIT Tab, Take 400 Units by mouth every morning., Disp: , Rfl:   •  sertraline (ZOLOFT) 100 MG Tab, Take 150 mg by mouth every morning., Disp: , Rfl:   •  pantoprazole (PROTONIX) 40 MG TBEC, Take 40 mg by mouth 2 times a day., Disp: , Rfl:   •  ibuprofen (MOTRIN) 800 MG TABS, Take 800 mg by mouth every 8 hours as needed., Disp: , Rfl:   •  dexamethasone (DECADRON) 0.5 MG/5ML Elixir, Take 20 mL by mouth every 12 hours as needed., Disp: 350 mL, Rfl: 0  •  HYDROCORTISONE, TOPICAL, 2 % Lotion, Apply  to affected area(s) as needed., Disp: , Rfl:   ALLERGIES:   Allergies   Allergen Reactions   • Bactrim      \"blisters inside of mouth and throat\"   • Bee Swelling   • Ampicillin Rash     SURGHX:   Past Surgical History:   Procedure Laterality Date   • CYSTOSCOPY STENT PLACEMENT  5/23/2018    Procedure: CYSTOSCOPY;  Surgeon: Matteo Perdomo M.D.;  Location: [facility];  Service: Urology   • URETEROSCOPY Right 5/23/2018    Procedure: URETEROSCOPY;  Surgeon: Matteo Perdomo M.D.;  Location: [facility];  Service: Urology   • LASERTRIPSY Right 5/23/2018    Procedure: LASERTRIPSY- LITHO;  Surgeon: Matteo Perdomo M.D.;  Location: [facility];  Service: Urology   • JIMMIE BY LAPAROSCOPY  7/17/2013    Performed by John Carrasco M.D. at [facility]   • CYSTOSCOPY STENT PLACEMENT  9/29/2011    Performed by MATTEO PERDOMO at [facility]   • URETEROSCOPY  9/29/2011    Performed by MATTEO PERDOMO at SURGERY [facility] ORS   • LASERTRIPSY  9/29/2011    " "Performed by YIFAN PERDOMO at SURGERY [facility] ORS   • LITHOTRIPSY       SOCHX:  reports that she has never smoked. She has never used smokeless tobacco. She reports current alcohol use. She reports that she does not use drugs.  FH: Family history was reviewed, no pertinent findings to report   Objective:   /78 (BP Location: Left arm, Patient Position: Sitting, BP Cuff Size: Adult)   Pulse 74   Temp 36.9 °C (98.4 °F) (Temporal)   Resp 16   Ht 1.651 m (5' 5\")   Wt 79.8 kg (176 lb)   LMP 04/15/2013   SpO2 96%   BMI 29.29 kg/m²   Physical Exam  Vitals reviewed.   Constitutional:       General: She is not in acute distress.     Appearance: Normal appearance. She is well-developed. She is not toxic-appearing.   HENT:      Head: Normocephalic and atraumatic.      Right Ear: Ear canal and external ear normal. A middle ear effusion is present. Tympanic membrane is injected, erythematous and bulging.      Left Ear: Ear canal and external ear normal. A middle ear effusion is present.      Nose: Congestion present.      Right Sinus: Maxillary sinus tenderness and frontal sinus tenderness present.      Left Sinus: Maxillary sinus tenderness and frontal sinus tenderness present.      Mouth/Throat:      Lips: Pink.      Mouth: Mucous membranes are moist.      Pharynx: Oropharynx is clear. Uvula midline.   Cardiovascular:      Rate and Rhythm: Normal rate and regular rhythm.      Heart sounds: Normal heart sounds, S1 normal and S2 normal.   Pulmonary:      Effort: Pulmonary effort is normal. No respiratory distress.      Breath sounds: Normal breath sounds. No stridor. No decreased breath sounds, wheezing, rhonchi or rales.   Skin:     General: Skin is dry.   Neurological:      Comments: Alert and oriented.    Psychiatric:         Speech: Speech normal.         Behavior: Behavior normal.           Assessment/Plan:   1. Non-recurrent acute serous otitis media of right ear  - fluticasone (FLONASE) 50 MCG/ACT nasal " spray; Administer 1 Spray into affected nostril(S) every day.  Dispense: 16 g; Refill: 0  - amoxicillin-clavulanate (AUGMENTIN) 875-125 MG Tab; Take 1 Tablet by mouth 2 times a day for 7 days.  Dispense: 14 Tablet; Refill: 0    2. Acute non-recurrent sinusitis, unspecified location  - fluticasone (FLONASE) 50 MCG/ACT nasal spray; Administer 1 Spray into affected nostril(S) every day.  Dispense: 16 g; Refill: 0  - amoxicillin-clavulanate (AUGMENTIN) 875-125 MG Tab; Take 1 Tablet by mouth 2 times a day for 7 days.  Dispense: 14 Tablet; Refill: 0    Other orders  - Probiotic Product (PROBIOTIC DAILY PO); Take  by mouth.    Pt instructed to complete full course of medication despite symptomatic improvement. Pt to take med with meals to alleviate GI upset.     Flonase 1 squirt in each nostril, as instructed in clinic, once a day.  Use nasal saline TID to promote drainage.   Salt water gurgles to soothe sore throat.  Ayr saline gel to moisturize nasal passage and prevent bleeding.  Try to use sudafed sparingly in order to allow sinuses to drain.  Avoid the longer formulations and try to take only in the morning and/or at noon if needed.  If you fail to improve in 3-5 days or symptoms worsen/new symptoms develop, RTC for reevaluation    Differential diagnosis, natural history, supportive care, and indications for immediate follow-up discussed.

## 2022-07-12 ENCOUNTER — PATIENT MESSAGE (OUTPATIENT)
Dept: MEDICAL GROUP | Facility: CLINIC | Age: 62
End: 2022-07-12

## 2022-07-12 RX ORDER — LEVOTHYROXINE SODIUM 137 UG/1
137 TABLET ORAL
Qty: 90 TABLET | Refills: 0 | Status: SHIPPED | OUTPATIENT
Start: 2022-07-12 | End: 2022-12-01

## 2022-07-12 RX ORDER — SERTRALINE HYDROCHLORIDE 100 MG/1
150 TABLET, FILM COATED ORAL EVERY MORNING
Qty: 135 TABLET | Refills: 0 | Status: SHIPPED | OUTPATIENT
Start: 2022-07-12 | End: 2023-01-10

## 2022-07-12 NOTE — TELEPHONE ENCOUNTER
Received request via: Pharmacy    Was the patient seen in the last year in this department? No , 05/14/2021, pt needs apt     Does the patient have an active prescription (recently filled or refills available) for medication(s) requested? No

## 2022-11-18 ENCOUNTER — OFFICE VISIT (OUTPATIENT)
Dept: MEDICAL GROUP | Facility: CLINIC | Age: 62
End: 2022-11-18
Payer: COMMERCIAL

## 2022-11-18 VITALS — RESPIRATION RATE: 17 BRPM | BODY MASS INDEX: 32.14 KG/M2 | HEIGHT: 66 IN | WEIGHT: 200 LBS

## 2022-11-18 DIAGNOSIS — I10 ESSENTIAL HYPERTENSION: ICD-10-CM

## 2022-11-18 DIAGNOSIS — R53.83 OTHER FATIGUE: ICD-10-CM

## 2022-11-18 DIAGNOSIS — Z23 NEED FOR VACCINATION: ICD-10-CM

## 2022-11-18 DIAGNOSIS — L23.9 ALLERGIC DERMATITIS: ICD-10-CM

## 2022-11-18 DIAGNOSIS — E66.9 OBESITY (BMI 35.0-39.9 WITHOUT COMORBIDITY): ICD-10-CM

## 2022-11-18 DIAGNOSIS — Z12.31 ENCOUNTER FOR SCREENING MAMMOGRAM FOR BREAST CANCER: ICD-10-CM

## 2022-11-18 DIAGNOSIS — E03.9 HYPOTHYROIDISM, UNSPECIFIED TYPE: ICD-10-CM

## 2022-11-18 DIAGNOSIS — L40.9 PSORIASIS: ICD-10-CM

## 2022-11-18 PROCEDURE — 90472 IMMUNIZATION ADMIN EACH ADD: CPT | Performed by: STUDENT IN AN ORGANIZED HEALTH CARE EDUCATION/TRAINING PROGRAM

## 2022-11-18 PROCEDURE — 99213 OFFICE O/P EST LOW 20 MIN: CPT | Mod: 25,GE | Performed by: STUDENT IN AN ORGANIZED HEALTH CARE EDUCATION/TRAINING PROGRAM

## 2022-11-18 PROCEDURE — 90686 IIV4 VACC NO PRSV 0.5 ML IM: CPT | Performed by: STUDENT IN AN ORGANIZED HEALTH CARE EDUCATION/TRAINING PROGRAM

## 2022-11-18 PROCEDURE — 91313 MODERNA SARS-COV-2 VACCINE BIVALENT BOOSTER (12+): CPT | Performed by: STUDENT IN AN ORGANIZED HEALTH CARE EDUCATION/TRAINING PROGRAM

## 2022-11-18 PROCEDURE — 0134A MODERNA SARS-COV-2 VACCINE BIVALENT BOOSTER (12+): CPT | Performed by: STUDENT IN AN ORGANIZED HEALTH CARE EDUCATION/TRAINING PROGRAM

## 2022-11-18 RX ORDER — TRIAMCINOLONE ACETONIDE 1 MG/G
1 CREAM TOPICAL PRN
Qty: 80 G | Refills: 1 | Status: SHIPPED | OUTPATIENT
Start: 2022-11-18

## 2022-11-18 SDOH — HEALTH STABILITY: MENTAL HEALTH
STRESS IS WHEN SOMEONE FEELS TENSE, NERVOUS, ANXIOUS, OR CAN'T SLEEP AT NIGHT BECAUSE THEIR MIND IS TROUBLED. HOW STRESSED ARE YOU?: TO SOME EXTENT

## 2022-11-18 SDOH — ECONOMIC STABILITY: HOUSING INSECURITY
IN THE LAST 12 MONTHS, WAS THERE A TIME WHEN YOU DID NOT HAVE A STEADY PLACE TO SLEEP OR SLEPT IN A SHELTER (INCLUDING NOW)?: NO

## 2022-11-18 SDOH — ECONOMIC STABILITY: INCOME INSECURITY: HOW HARD IS IT FOR YOU TO PAY FOR THE VERY BASICS LIKE FOOD, HOUSING, MEDICAL CARE, AND HEATING?: NOT HARD AT ALL

## 2022-11-18 SDOH — ECONOMIC STABILITY: HOUSING INSECURITY: IN THE LAST 12 MONTHS, HOW MANY PLACES HAVE YOU LIVED?: 1

## 2022-11-18 SDOH — ECONOMIC STABILITY: TRANSPORTATION INSECURITY
IN THE PAST 12 MONTHS, HAS LACK OF TRANSPORTATION KEPT YOU FROM MEETINGS, WORK, OR FROM GETTING THINGS NEEDED FOR DAILY LIVING?: NO

## 2022-11-18 SDOH — ECONOMIC STABILITY: FOOD INSECURITY: WITHIN THE PAST 12 MONTHS, YOU WORRIED THAT YOUR FOOD WOULD RUN OUT BEFORE YOU GOT MONEY TO BUY MORE.: NEVER TRUE

## 2022-11-18 SDOH — ECONOMIC STABILITY: INCOME INSECURITY: IN THE LAST 12 MONTHS, WAS THERE A TIME WHEN YOU WERE NOT ABLE TO PAY THE MORTGAGE OR RENT ON TIME?: NO

## 2022-11-18 SDOH — ECONOMIC STABILITY: TRANSPORTATION INSECURITY
IN THE PAST 12 MONTHS, HAS THE LACK OF TRANSPORTATION KEPT YOU FROM MEDICAL APPOINTMENTS OR FROM GETTING MEDICATIONS?: NO

## 2022-11-18 SDOH — ECONOMIC STABILITY: FOOD INSECURITY: WITHIN THE PAST 12 MONTHS, THE FOOD YOU BOUGHT JUST DIDN'T LAST AND YOU DIDN'T HAVE MONEY TO GET MORE.: NEVER TRUE

## 2022-11-18 SDOH — HEALTH STABILITY: PHYSICAL HEALTH: ON AVERAGE, HOW MANY DAYS PER WEEK DO YOU ENGAGE IN MODERATE TO STRENUOUS EXERCISE (LIKE A BRISK WALK)?: 5 DAYS

## 2022-11-18 SDOH — HEALTH STABILITY: PHYSICAL HEALTH

## 2022-11-18 SDOH — ECONOMIC STABILITY: TRANSPORTATION INSECURITY
IN THE PAST 12 MONTHS, HAS LACK OF RELIABLE TRANSPORTATION KEPT YOU FROM MEDICAL APPOINTMENTS, MEETINGS, WORK OR FROM GETTING THINGS NEEDED FOR DAILY LIVING?: NO

## 2022-11-18 ASSESSMENT — LIFESTYLE VARIABLES
HOW OFTEN DO YOU HAVE A DRINK CONTAINING ALCOHOL: MONTHLY OR LESS
SKIP TO QUESTIONS 9-10: 1
AUDIT-C TOTAL SCORE: 1
HOW OFTEN DO YOU HAVE SIX OR MORE DRINKS ON ONE OCCASION: NEVER
HOW MANY STANDARD DRINKS CONTAINING ALCOHOL DO YOU HAVE ON A TYPICAL DAY: 1 OR 2

## 2022-11-18 ASSESSMENT — SOCIAL DETERMINANTS OF HEALTH (SDOH)
HOW OFTEN DO YOU GET TOGETHER WITH FRIENDS OR RELATIVES?: ONCE A WEEK
HOW OFTEN DO YOU ATTEND CHURCH OR RELIGIOUS SERVICES?: NEVER
IN A TYPICAL WEEK, HOW MANY TIMES DO YOU TALK ON THE PHONE WITH FAMILY, FRIENDS, OR NEIGHBORS?: ONCE A WEEK
HOW OFTEN DO YOU HAVE SIX OR MORE DRINKS ON ONE OCCASION: NEVER
HOW OFTEN DO YOU GET TOGETHER WITH FRIENDS OR RELATIVES?: ONCE A WEEK
IN A TYPICAL WEEK, HOW MANY TIMES DO YOU TALK ON THE PHONE WITH FAMILY, FRIENDS, OR NEIGHBORS?: ONCE A WEEK
HOW OFTEN DO YOU ATTEND CHURCH OR RELIGIOUS SERVICES?: NEVER
WITHIN THE PAST 12 MONTHS, YOU WORRIED THAT YOUR FOOD WOULD RUN OUT BEFORE YOU GOT THE MONEY TO BUY MORE: NEVER TRUE
HOW OFTEN DO YOU HAVE A DRINK CONTAINING ALCOHOL: MONTHLY OR LESS
HOW MANY DRINKS CONTAINING ALCOHOL DO YOU HAVE ON A TYPICAL DAY WHEN YOU ARE DRINKING: 1 OR 2
HOW HARD IS IT FOR YOU TO PAY FOR THE VERY BASICS LIKE FOOD, HOUSING, MEDICAL CARE, AND HEATING?: NOT HARD AT ALL

## 2022-11-18 ASSESSMENT — FIBROSIS 4 INDEX: FIB4 SCORE: 1.29

## 2022-11-18 ASSESSMENT — PATIENT HEALTH QUESTIONNAIRE - PHQ9: CLINICAL INTERPRETATION OF PHQ2 SCORE: 0

## 2022-11-19 NOTE — PROGRESS NOTES
HPI:  Patient is a 62 y.o. female. This pleasant patient is here today - wife recent TAVR     Tinnitus 2 year, started subtle (ear infection - did shoot guns)   Baker cyst bursitis - Dr Cid, Ortho, has not  Naproxen, never needed   Vaccines Covid and flu    BP elevated today, 130/85 when donating plasma        No problems updated.                                                                                                                               Patient Active Problem List    Diagnosis Date Noted    Obesity (BMI 35.0-39.9 without comorbidity) (HCC) 07/18/2018    Right ureteral stone 05/24/2018    Folliculitis 04/26/2017       Current Outpatient Medications   Medication Sig Dispense Refill    triamcinolone acetonide (KENALOG) 0.1 % Cream Apply 1 Application topically as needed (itching). Indications: Allergic Contact Dermatitis 80 g 1    sertraline (ZOLOFT) 100 MG Tab Take 1.5 Tablets by mouth every morning. Indications: Irritable Bowel Syndrome 135 Tablet 0    levothyroxine (SYNTHROID) 137 MCG Tab Take 1 Tablet by mouth every morning on an empty stomach. 90 Tablet 0    Probiotic Product (PROBIOTIC DAILY PO) Take  by mouth.      fluticasone (FLONASE) 50 MCG/ACT nasal spray Administer 1 Spray into affected nostril(S) every day. 16 g 0    aspirin (ASA) 81 MG Chew Tab chewable tablet Chew 1 Tablet every day. Indications: Fever, blood thinner      VITAMIN B COMPLEX-C PO Take 1 Tab by mouth every morning.      Cholecalciferol (VITAMIN D) 400 UNIT Tab Take 1 Tablet by mouth every morning.      pantoprazole (PROTONIX) 40 MG TBEC Take 1 Tablet by mouth 2 times a day.      ibuprofen (MOTRIN) 800 MG TABS Take 1 Tablet by mouth every 8 hours as needed.       No current facility-administered medications for this visit.         Allergies as of 11/18/2022 - Reviewed 04/12/2022   Allergen Reaction Noted    Bactrim  05/28/2011    Bee Swelling 03/19/2011    Ampicillin Rash 03/19/2011          BP (!) (P) 162/82 (BP  "Location: Right arm, Patient Position: Sitting, BP Cuff Size: Adult)   Pulse (P) 73   Temp (P) 36.6 °C (97.9 °F) (Temporal)   Resp 17   Ht 1.676 m (5' 6\")   Wt 90.7 kg (200 lb)   LMP 04/15/2013   SpO2 (P) 97%   BMI 32.28 kg/m²     Gen: no fever/chills  CV: No chest pain  Resp: No SOB  GI/: No bowel or bladder complaints  Psych: No depression      Physical Exam:  Gen:         Alert and oriented, No apparent distress.  Neck:        No Lymphadenopathy or Bruits.  Lungs:     Clear to auscultation bilaterally  CV:           Regular rate and rhythm. No murmurs, rubs or gallops.    Abdomen: soft, nontender, nondistended.    Skin:      no rash or exudate             Ext:          No clubbing, cyanosis, edema.      Assessment and Plan    62 y.o. female with the following-  Discussed chronic conditions below, discussed home BP monitoring, Mammogram due/ordered  Vaccinate against covid/flu today  Good response with CS cream on flares of eczema   Vaccinating today  Problem List Items Addressed This Visit       Obesity (BMI 35.0-39.9 without comorbidity) (HCC)     Other Visit Diagnoses       Encounter for screening mammogram for breast cancer        Relevant Orders    MA-SCREENING MAMMO BILAT W/TOMOSYNTHESIS W/CAD    Essential hypertension        Hypothyroidism, unspecified type        Other fatigue        Allergic dermatitis        Relevant Medications    triamcinolone acetonide (KENALOG) 0.1 % Cream    Psoriasis        Relevant Medications    triamcinolone acetonide (KENALOG) 0.1 % Cream    Need for vaccination               F/u q3-6 months, or sooner if needed    Elana Lutz M.D.       "

## 2022-11-26 ENCOUNTER — HOSPITAL ENCOUNTER (OUTPATIENT)
Dept: LAB | Facility: MEDICAL CENTER | Age: 62
End: 2022-11-26
Attending: STUDENT IN AN ORGANIZED HEALTH CARE EDUCATION/TRAINING PROGRAM
Payer: COMMERCIAL

## 2022-11-26 DIAGNOSIS — E66.9 OBESITY (BMI 35.0-39.9 WITHOUT COMORBIDITY): ICD-10-CM

## 2022-11-26 DIAGNOSIS — E03.9 HYPOTHYROIDISM, UNSPECIFIED TYPE: ICD-10-CM

## 2022-11-26 DIAGNOSIS — R53.83 OTHER FATIGUE: ICD-10-CM

## 2022-11-26 LAB
ALBUMIN SERPL BCP-MCNC: 3.6 G/DL (ref 3.2–4.9)
ALBUMIN/GLOB SERPL: 1.3 G/DL
ALP SERPL-CCNC: 51 U/L (ref 30–99)
ALT SERPL-CCNC: 39 U/L (ref 2–50)
ANION GAP SERPL CALC-SCNC: 8 MMOL/L (ref 7–16)
AST SERPL-CCNC: 24 U/L (ref 12–45)
BILIRUB SERPL-MCNC: 0.3 MG/DL (ref 0.1–1.5)
BUN SERPL-MCNC: 13 MG/DL (ref 8–22)
CALCIUM SERPL-MCNC: 9.2 MG/DL (ref 8.5–10.5)
CHLORIDE SERPL-SCNC: 106 MMOL/L (ref 96–112)
CHOLEST SERPL-MCNC: 216 MG/DL (ref 100–199)
CO2 SERPL-SCNC: 29 MMOL/L (ref 20–33)
CREAT SERPL-MCNC: 0.7 MG/DL (ref 0.5–1.4)
EST. AVERAGE GLUCOSE BLD GHB EST-MCNC: 108 MG/DL
FASTING STATUS PATIENT QL REPORTED: NORMAL
GFR SERPLBLD CREATININE-BSD FMLA CKD-EPI: 97 ML/MIN/1.73 M 2
GLOBULIN SER CALC-MCNC: 2.8 G/DL (ref 1.9–3.5)
GLUCOSE SERPL-MCNC: 89 MG/DL (ref 65–99)
HBA1C MFR BLD: 5.4 % (ref 4–5.6)
HDLC SERPL-MCNC: 30 MG/DL
LDLC SERPL CALC-MCNC: 134 MG/DL
POTASSIUM SERPL-SCNC: 4.1 MMOL/L (ref 3.6–5.5)
PROT SERPL-MCNC: 6.4 G/DL (ref 6–8.2)
SODIUM SERPL-SCNC: 143 MMOL/L (ref 135–145)
TRIGL SERPL-MCNC: 260 MG/DL (ref 0–149)
TSH SERPL DL<=0.005 MIU/L-ACNC: 2.72 UIU/ML (ref 0.38–5.33)

## 2022-11-26 PROCEDURE — 80053 COMPREHEN METABOLIC PANEL: CPT

## 2022-11-26 PROCEDURE — 80061 LIPID PANEL: CPT

## 2022-11-26 PROCEDURE — 36415 COLL VENOUS BLD VENIPUNCTURE: CPT

## 2022-11-26 PROCEDURE — 83036 HEMOGLOBIN GLYCOSYLATED A1C: CPT

## 2022-11-26 PROCEDURE — 84443 ASSAY THYROID STIM HORMONE: CPT

## 2022-12-01 RX ORDER — LEVOTHYROXINE SODIUM 137 UG/1
TABLET ORAL
Qty: 90 TABLET | Refills: 3 | Status: SHIPPED | OUTPATIENT
Start: 2022-12-01 | End: 2023-08-16 | Stop reason: SDUPTHER

## 2023-01-03 ENCOUNTER — HOSPITAL ENCOUNTER (OUTPATIENT)
Dept: RADIOLOGY | Facility: MEDICAL CENTER | Age: 63
End: 2023-01-03
Attending: STUDENT IN AN ORGANIZED HEALTH CARE EDUCATION/TRAINING PROGRAM
Payer: COMMERCIAL

## 2023-01-03 DIAGNOSIS — Z12.31 ENCOUNTER FOR SCREENING MAMMOGRAM FOR BREAST CANCER: ICD-10-CM

## 2023-01-03 PROCEDURE — 77063 BREAST TOMOSYNTHESIS BI: CPT

## 2023-01-10 RX ORDER — SERTRALINE HYDROCHLORIDE 100 MG/1
TABLET, FILM COATED ORAL
Qty: 135 TABLET | Refills: 0 | Status: SHIPPED | OUTPATIENT
Start: 2023-01-10 | End: 2023-05-10 | Stop reason: SDUPTHER

## 2023-05-10 RX ORDER — SERTRALINE HYDROCHLORIDE 100 MG/1
TABLET, FILM COATED ORAL
Qty: 135 TABLET | Refills: 0 | Status: SHIPPED | OUTPATIENT
Start: 2023-05-10 | End: 2023-07-31

## 2023-07-31 RX ORDER — SERTRALINE HYDROCHLORIDE 100 MG/1
TABLET, FILM COATED ORAL
Qty: 135 TABLET | Refills: 0 | Status: SHIPPED | OUTPATIENT
Start: 2023-07-31 | End: 2023-08-16 | Stop reason: SDUPTHER

## 2023-08-16 ENCOUNTER — OFFICE VISIT (OUTPATIENT)
Dept: MEDICAL GROUP | Facility: CLINIC | Age: 63
End: 2023-08-16
Payer: COMMERCIAL

## 2023-08-16 VITALS
OXYGEN SATURATION: 96 % | HEART RATE: 68 BPM | SYSTOLIC BLOOD PRESSURE: 146 MMHG | HEIGHT: 66 IN | TEMPERATURE: 97.9 F | WEIGHT: 201 LBS | BODY MASS INDEX: 32.3 KG/M2 | DIASTOLIC BLOOD PRESSURE: 80 MMHG

## 2023-08-16 DIAGNOSIS — K21.9 GASTROESOPHAGEAL REFLUX DISEASE WITHOUT ESOPHAGITIS: ICD-10-CM

## 2023-08-16 DIAGNOSIS — E03.9 HYPOTHYROIDISM, UNSPECIFIED TYPE: Primary | ICD-10-CM

## 2023-08-16 DIAGNOSIS — K58.8 OTHER IRRITABLE BOWEL SYNDROME: ICD-10-CM

## 2023-08-16 DIAGNOSIS — R03.0 ELEVATED BLOOD PRESSURE READING IN OFFICE WITH WHITE COAT SYNDROME, WITHOUT DIAGNOSIS OF HYPERTENSION: ICD-10-CM

## 2023-08-16 PROBLEM — E03.8 OTHER SPECIFIED HYPOTHYROIDISM: Status: ACTIVE | Noted: 2023-08-16

## 2023-08-16 PROBLEM — E03.8 OTHER SPECIFIED HYPOTHYROIDISM: Status: RESOLVED | Noted: 2023-08-16 | Resolved: 2023-08-16

## 2023-08-16 PROBLEM — G47.33 OBSTRUCTIVE SLEEP APNEA SYNDROME IN ADULT: Status: ACTIVE | Noted: 2019-03-29

## 2023-08-16 PROBLEM — E78.5 HYPERLIPIDEMIA: Status: ACTIVE | Noted: 2023-08-16

## 2023-08-16 PROCEDURE — 3077F SYST BP >= 140 MM HG: CPT | Performed by: FAMILY MEDICINE

## 2023-08-16 PROCEDURE — 99214 OFFICE O/P EST MOD 30 MIN: CPT | Performed by: FAMILY MEDICINE

## 2023-08-16 PROCEDURE — 3079F DIAST BP 80-89 MM HG: CPT | Performed by: FAMILY MEDICINE

## 2023-08-16 RX ORDER — PANTOPRAZOLE SODIUM 40 MG/1
40 TABLET, DELAYED RELEASE ORAL 2 TIMES DAILY
Qty: 90 TABLET | Refills: 3 | Status: SHIPPED | OUTPATIENT
Start: 2023-08-16

## 2023-08-16 RX ORDER — LEVOTHYROXINE SODIUM 137 UG/1
137 TABLET ORAL
Qty: 90 TABLET | Refills: 3 | Status: SHIPPED | OUTPATIENT
Start: 2023-08-16

## 2023-08-16 RX ORDER — SERTRALINE HYDROCHLORIDE 100 MG/1
TABLET, FILM COATED ORAL
Qty: 135 TABLET | Refills: 0 | Status: SHIPPED | OUTPATIENT
Start: 2023-08-16

## 2023-08-16 ASSESSMENT — PATIENT HEALTH QUESTIONNAIRE - PHQ9: CLINICAL INTERPRETATION OF PHQ2 SCORE: 0

## 2023-08-16 NOTE — PROGRESS NOTES
HPI:  Patient is a 63 y.o. female. This pleasant patient is here today to review multiple medical issues.       Problem   Other Specified Hypothyroidism     Has taken levothyroxine in the 1980s.  She is on a current dose of levothyroxine 137 mcg a day.        Other Irritable Bowel Syndrome     When she started on sertraline her IBS has been markedly better.  She is compliant with tx       Gastroesophageal Reflux Disease   Hyperlipidemia   Hypothyroidism   Elevated Blood Pressure Reading in Office With White Coat Syndrome, Without Diagnosis of Hypertension     Has BP taken when she donates plasma and it runs 120-130s/70-80s. No antihypertensives.  /80 today.      Obstructive Sleep Apnea Syndrome in Adult                                                                                                                                        Patient Active Problem List     Diagnosis Date Noted    Other specified hypothyroidism 08/16/2023    Other irritable bowel syndrome 08/16/2023    Gastroesophageal reflux disease 08/16/2023    Hyperlipidemia 08/16/2023    Hypothyroidism 08/16/2023    Elevated blood pressure reading in office with white coat syndrome, without diagnosis of hypertension 08/16/2023    Obstructive sleep apnea syndrome in adult 03/29/2019    Obesity (BMI 35.0-39.9 without comorbidity) ([facility]) 07/18/2018    Right ureteral stone 05/24/2018    Folliculitis 04/26/2017         Current Medications          Current Outpatient Medications   Medication Sig Dispense Refill    sertraline (ZOLOFT) 100 MG Tab TAKE 1 & 1/2 (ONE & ONE-HALF) TABLETS BY MOUTH ONCE DAILY IN THE MORNING FOR  Tablet 0    levothyroxine (SYNTHROID) 137 MCG Tab TAKE 1 TABLET BY MOUTH IN THE MORNING ON AN EMPTY STOMACH 90 Tablet 3    triamcinolone acetonide (KENALOG) 0.1 % Cream Apply 1 Application topically as needed (itching). Indications: Allergic Contact Dermatitis 80 g 1    Probiotic Product (PROBIOTIC DAILY PO) Take  by mouth.      "   aspirin (ASA) 81 MG Chew Tab chewable tablet Chew 1 Tablet every day. Indications: Fever, blood thinner        VITAMIN B COMPLEX-C PO Take 1 Tab by mouth every morning.        Cholecalciferol (VITAMIN D) 400 UNIT Tab Take 1 Tablet by mouth every morning.        pantoprazole (PROTONIX) 40 MG TBEC Take 1 Tablet by mouth 2 times a day.        ibuprofen (MOTRIN) 800 MG TABS Take 1 Tablet by mouth every 8 hours as needed.          No current facility-administered medications for this visit.                     Allergies as of 08/16/2023 - Reviewed 08/16/2023   Allergen Reaction Noted    Bactrim   05/28/2011    Bee Swelling 03/19/2011    Ampicillin Rash 03/19/2011                       BP (!) 146/80 (BP Location: Right arm, Patient Position: Sitting, BP Cuff Size: Adult)   Pulse 68   Temp 36.6 °C (97.9 °F) (Temporal)   Ht 1.676 m (5' 6\")   Wt 91.2 kg (201 lb)   LMP 04/15/2013   SpO2 96%   BMI 32.44 kg/m²      Gen: no fevers/chills, no changes in weight  Eyes: no changes in vision  ENT: no sore throat, no hearing loss, no bloody nose  Pulm: no sob, no cough  CV: no chest pain, no palpitations  GI: no nausea/vomiting, no diarrhea  : no dysuria or flank pain  MSk: no myalgias  Skin: no rash  Psych: no change in mood   Neuro: no headaches, no numbness/tingling  Heme/Lymph: no easy bruising or bleeding     Physical Exam:  Gen:         Alert and oriented, No apparent distress.  Neck:        No Lymphadenopathy or Bruits.  Lungs:     Clear to auscultation bilaterally  CV:           Regular rate and rhythm. No murmurs, rubs or gallops.    Abdomen: soft, nontender, nondistended.    Skin:      no rash or exudate             Ext:          No clubbing, cyanosis, edema.        Assessment and Plan     63 y.o. female with the following-  Problem List Items Addressed This Visit         Other specified hypothyroidism       Chronic condition. Controlled.  She had TSH drawn 11/2022 and it was 2.7.  She does admit to feeling a " little more anxious than normal. She feels like her job is stressful.             Other irritable bowel syndrome       Chronic condition. Controlled.  Continue rx.            Elevated blood pressure reading in office with white coat syndrome, without diagnosis of hypertension       Chronic condition. Controlled.  No medications.  Normal when she is not at our office.             No follow-ups on file.

## 2023-08-16 NOTE — LETTER
[facility]     August 16, 2023    Patient: Majo Milner   YOB: 1960   Date of Visit: 8/16/2023       To Whom It May Concern:    Majo Milner was seen and treated in our department on 8/16/2023.     Sincerely,     Alona Rawls MD

## 2023-08-16 NOTE — ASSESSMENT & PLAN NOTE
Chronic condition. Controlled.  She had TSH drawn 11/2022 and it was 2.7.  She does admit to feeling a little more anxious than normal. She feels like her job is stressful.

## 2023-08-16 NOTE — PROGRESS NOTES
HPI:  Patient is a 63 y.o. female. This pleasant patient is here today to review multiple medical issues.   Problem   Other Specified Hypothyroidism    Has taken levothyroxine in the 1980s.  She is on a current dose of levothyroxine 137 mcg a day.       Other Irritable Bowel Syndrome    When she started on sertraline her IBS has been markedly better.  She is compliant with tx      Gastroesophageal Reflux Disease   Hyperlipidemia   Hypothyroidism   Elevated Blood Pressure Reading in Office With White Coat Syndrome, Without Diagnosis of Hypertension    Has BP taken when she donates plasma and it runs 120-130s/70-80s. No antihypertensives.  /80 today.     Obstructive Sleep Apnea Syndrome in Adult                                                                                                                                  Patient Active Problem List    Diagnosis Date Noted    Other specified hypothyroidism 08/16/2023    Other irritable bowel syndrome 08/16/2023    Gastroesophageal reflux disease 08/16/2023    Hyperlipidemia 08/16/2023    Hypothyroidism 08/16/2023    Elevated blood pressure reading in office with white coat syndrome, without diagnosis of hypertension 08/16/2023    Obstructive sleep apnea syndrome in adult 03/29/2019    Obesity (BMI 35.0-39.9 without comorbidity) ([facility]) 07/18/2018    Right ureteral stone 05/24/2018    Folliculitis 04/26/2017       Current Outpatient Medications   Medication Sig Dispense Refill    sertraline (ZOLOFT) 100 MG Tab TAKE 1 & 1/2 (ONE & ONE-HALF) TABLETS BY MOUTH ONCE DAILY IN THE MORNING FOR  Tablet 0    levothyroxine (SYNTHROID) 137 MCG Tab TAKE 1 TABLET BY MOUTH IN THE MORNING ON AN EMPTY STOMACH 90 Tablet 3    triamcinolone acetonide (KENALOG) 0.1 % Cream Apply 1 Application topically as needed (itching). Indications: Allergic Contact Dermatitis 80 g 1    Probiotic Product (PROBIOTIC DAILY PO) Take  by mouth.      aspirin (ASA) 81 MG Chew Tab chewable tablet  "Chew 1 Tablet every day. Indications: Fever, blood thinner      VITAMIN B COMPLEX-C PO Take 1 Tab by mouth every morning.      Cholecalciferol (VITAMIN D) 400 UNIT Tab Take 1 Tablet by mouth every morning.      pantoprazole (PROTONIX) 40 MG TBEC Take 1 Tablet by mouth 2 times a day.      ibuprofen (MOTRIN) 800 MG TABS Take 1 Tablet by mouth every 8 hours as needed.       No current facility-administered medications for this visit.         Allergies as of 08/16/2023 - Reviewed 08/16/2023   Allergen Reaction Noted    Bactrim  05/28/2011    Bee Swelling 03/19/2011    Ampicillin Rash 03/19/2011          BP (!) 146/80 (BP Location: Right arm, Patient Position: Sitting, BP Cuff Size: Adult)   Pulse 68   Temp 36.6 °C (97.9 °F) (Temporal)   Ht 1.676 m (5' 6\")   Wt 91.2 kg (201 lb)   LMP 04/15/2013   SpO2 96%   BMI 32.44 kg/m²     Gen: no fevers/chills, no changes in weight  Eyes: no changes in vision  ENT: no sore throat, no hearing loss, no bloody nose  Pulm: no sob, no cough  CV: no chest pain, no palpitations  GI: no nausea/vomiting, no diarrhea  : no dysuria or flank pain  MSk: no myalgias  Skin: no rash  Psych: no change in mood   Neuro: no headaches, no numbness/tingling  Heme/Lymph: no easy bruising or bleeding    Physical Exam:  Gen:         Alert and oriented, No apparent distress.  Neck:        No Lymphadenopathy or Bruits.  Lungs:     Clear to auscultation bilaterally  CV:           Regular rate and rhythm. No murmurs, rubs or gallops.    Abdomen: soft, nontender, nondistended.    Skin:      no rash or exudate             Ext:          No clubbing, cyanosis, edema.      Assessment and Plan    63 y.o. female with the following-  Problem List Items Addressed This Visit       Other specified hypothyroidism     Chronic condition. Controlled.  She had TSH drawn 11/2022 and it was 2.7.  She does admit to feeling a little more anxious than normal. She feels like her job is stressful.           Other irritable " bowel syndrome     Chronic condition. Controlled.  Continue rx.          Elevated blood pressure reading in office with white coat syndrome, without diagnosis of hypertension     Chronic condition. Controlled.  No medications.  Normal when she is not at our office.           No follow-ups on file.

## 2024-04-21 DIAGNOSIS — K58.8 OTHER IRRITABLE BOWEL SYNDROME: ICD-10-CM

## 2024-04-22 RX ORDER — SERTRALINE HYDROCHLORIDE 100 MG/1
TABLET, FILM COATED ORAL
Qty: 135 TABLET | Refills: 0 | Status: SHIPPED | OUTPATIENT
Start: 2024-04-22

## 2024-04-22 NOTE — TELEPHONE ENCOUNTER
Received request via: Pharmacy    Was the patient seen in the last year in this department? Yes    Does the patient have an active prescription (recently filled or refills available) for medication(s) requested? No    Pharmacy Name: [facility]    Does the patient have long term Plus and need 100 day supply (blood pressure, diabetes and cholesterol meds only)? Patient does not have SCP

## 2024-05-22 ENCOUNTER — TELEPHONE (OUTPATIENT)
Dept: HEALTH INFORMATION MANAGEMENT | Facility: OTHER | Age: 64
End: 2024-05-22
Payer: COMMERCIAL

## 2024-06-03 ENCOUNTER — APPOINTMENT (OUTPATIENT)
Dept: FAMILY PLANNING/WOMEN'S HEALTH CLINIC | Facility: PHYSICIAN GROUP | Age: 64
End: 2024-06-03
Payer: COMMERCIAL

## 2024-06-03 VITALS
HEIGHT: 66 IN | BODY MASS INDEX: 32.14 KG/M2 | DIASTOLIC BLOOD PRESSURE: 82 MMHG | SYSTOLIC BLOOD PRESSURE: 140 MMHG | WEIGHT: 200 LBS

## 2024-06-03 DIAGNOSIS — E03.9 HYPOTHYROIDISM, UNSPECIFIED TYPE: ICD-10-CM

## 2024-06-03 DIAGNOSIS — K21.9 GASTROESOPHAGEAL REFLUX DISEASE, UNSPECIFIED WHETHER ESOPHAGITIS PRESENT: ICD-10-CM

## 2024-06-03 DIAGNOSIS — Z12.12 SCREENING FOR COLORECTAL CANCER: ICD-10-CM

## 2024-06-03 DIAGNOSIS — R03.0 ELEVATED BLOOD PRESSURE READING IN OFFICE WITH WHITE COAT SYNDROME, WITHOUT DIAGNOSIS OF HYPERTENSION: ICD-10-CM

## 2024-06-03 DIAGNOSIS — Z13.820 ENCOUNTER FOR OSTEOPOROSIS SCREENING IN ASYMPTOMATIC POSTMENOPAUSAL PATIENT: ICD-10-CM

## 2024-06-03 DIAGNOSIS — E78.5 HYPERLIPIDEMIA, UNSPECIFIED HYPERLIPIDEMIA TYPE: ICD-10-CM

## 2024-06-03 DIAGNOSIS — Z12.11 SCREENING FOR COLORECTAL CANCER: ICD-10-CM

## 2024-06-03 DIAGNOSIS — Z78.0 ENCOUNTER FOR OSTEOPOROSIS SCREENING IN ASYMPTOMATIC POSTMENOPAUSAL PATIENT: ICD-10-CM

## 2024-06-03 PROCEDURE — 3079F DIAST BP 80-89 MM HG: CPT

## 2024-06-03 PROCEDURE — G0439 PPPS, SUBSEQ VISIT: HCPCS

## 2024-06-03 PROCEDURE — 3077F SYST BP >= 140 MM HG: CPT

## 2024-06-03 SDOH — ECONOMIC STABILITY: FOOD INSECURITY: WITHIN THE PAST 12 MONTHS, YOU WORRIED THAT YOUR FOOD WOULD RUN OUT BEFORE YOU GOT MONEY TO BUY MORE.: NEVER TRUE

## 2024-06-03 SDOH — ECONOMIC STABILITY: INCOME INSECURITY: IN THE LAST 12 MONTHS, WAS THERE A TIME WHEN YOU WERE NOT ABLE TO PAY THE MORTGAGE OR RENT ON TIME?: NO

## 2024-06-03 SDOH — ECONOMIC STABILITY: FOOD INSECURITY: WITHIN THE PAST 12 MONTHS, THE FOOD YOU BOUGHT JUST DIDN'T LAST AND YOU DIDN'T HAVE MONEY TO GET MORE.: NEVER TRUE

## 2024-06-03 SDOH — ECONOMIC STABILITY: HOUSING INSECURITY: IN THE LAST 12 MONTHS, HOW MANY PLACES HAVE YOU LIVED?: 1

## 2024-06-03 SDOH — ECONOMIC STABILITY: INCOME INSECURITY: HOW HARD IS IT FOR YOU TO PAY FOR THE VERY BASICS LIKE FOOD, HOUSING, MEDICAL CARE, AND HEATING?: NOT HARD AT ALL

## 2024-06-03 ASSESSMENT — PAIN SCALES - GENERAL: PAINLEVEL: NO PAIN

## 2024-06-03 ASSESSMENT — PATIENT HEALTH QUESTIONNAIRE - PHQ9
1. LITTLE INTEREST OR PLEASURE IN DOING THINGS: SEVERAL DAYS
CLINICAL INTERPRETATION OF PHQ2 SCORE: 0
2. FEELING DOWN, DEPRESSED, IRRITABLE, OR HOPELESS: SEVERAL DAYS

## 2024-06-03 ASSESSMENT — ACTIVITIES OF DAILY LIVING (ADL): BATHING_REQUIRES_ASSISTANCE: 0

## 2024-06-03 ASSESSMENT — ENCOUNTER SYMPTOMS: GENERAL WELL-BEING: GOOD

## 2024-06-03 NOTE — ASSESSMENT & PLAN NOTE
Chronic, stable. The patient reports that she checks her blood pressure at home and it is usually 130's over 80's. No current treatment. Advised that the patient record her blood pressure readings and bring it to her next PCP appointment.  Follow-up at least annually.

## 2024-06-03 NOTE — ASSESSMENT & PLAN NOTE
Chronic, stable. The patient reports that her symptoms are well controlled with current medication. Continue with current defined treatment plan: pantoprazole (PROTONIX) 40 MG Tablet Delayed Response. Follow-up at least annually.

## 2024-06-03 NOTE — PROGRESS NOTES
Comprehensive Health Assessment Program     CAMERON JJ is a 64 y.o. here for her comprehensive health assessment.    Patient Active Problem List    Diagnosis Date Noted    Screening for colorectal cancer 06/04/2024    Encounter for osteoporosis screening in asymptomatic postmenopausal patient 06/04/2024    Other irritable bowel syndrome 08/16/2023    Gastroesophageal reflux disease 08/16/2023    Hyperlipidemia 08/16/2023    Hypothyroidism 08/16/2023    Elevated blood pressure reading in office with white coat syndrome, without diagnosis of hypertension 08/16/2023    Obstructive sleep apnea syndrome in adult 03/29/2019    BMI 32.0-32.9,adult 07/18/2018    Right ureteral stone 05/24/2018    Folliculitis 04/26/2017       Current Outpatient Medications   Medication Sig Dispense Refill    sertraline (ZOLOFT) 100 MG Tab TAKE 1 & 1/2 (ONE & ONE-HALF) TABLETS BY MOUTH ONCE DAILY IN THE MORNING FOR   Tablet 0    levothyroxine (SYNTHROID) 137 MCG Tab Take 1 Tablet by mouth every morning on an empty stomach. 90 Tablet 3    pantoprazole (PROTONIX) 40 MG Tablet Delayed Response Take 1 Tablet by mouth 2 times a day. 90 Tablet 3    triamcinolone acetonide (KENALOG) 0.1 % Cream Apply 1 Application topically as needed (itching). Indications: Allergic Contact Dermatitis 80 g 1    Probiotic Product (PROBIOTIC DAILY PO) Take  by mouth.      aspirin (ASA) 81 MG Chew Tab chewable tablet Chew 1 Tablet every day. Indications: Fever, blood thinner      VITAMIN B COMPLEX-C PO Take 1 Tab by mouth every morning.      Cholecalciferol (VITAMIN D) 400 UNIT Tab Take 1 Tablet by mouth every morning.       No current facility-administered medications for this visit.          Current supplements as per medication list.     Allergies:   Bactrim, Bee, and Ampicillin  Social History     Tobacco Use    Smoking status: Never    Smokeless tobacco: Never   Vaping Use    Vaping status: Never Used   Substance Use Topics    Alcohol use: Yes      Comment: 6 PER YEAR     Drug use: No     History reviewed. No pertinent family history.  CAMERON  has a past medical history of Anesthesia, Arrhythmia, Dental disorder, GERD (gastroesophageal reflux disease), Hashimoto's disease, Heart burn, Heart murmur, History of MRSA infection (2016), IBS (irritable bowel syndrome), Indigestion, Kidney calculi, Other specified disorder of intestines, Sleep apnea, and Snoring.   Past Surgical History:   Procedure Laterality Date    CYSTOSCOPY STENT PLACEMENT  5/23/2018    Procedure: CYSTOSCOPY;  Surgeon: Yifan Perdomo M.D.;  Location: SURGERY [facility];  Service: Urology    URETEROSCOPY Right 5/23/2018    Procedure: URETEROSCOPY;  Surgeon: Yifan Perdomo M.D.;  Location: SURGERY [facility];  Service: Urology    LASERTRIPSY Right 5/23/2018    Procedure: LASERTRIPSY- LITHO;  Surgeon: Yifan Perdomo M.D.;  Location: SURGERY [facility];  Service: Urology    JIMMIE BY LAPAROSCOPY  7/17/2013    Performed by John Carrasco M.D. at SURGERY [facility]    CYSTOSCOPY STENT PLACEMENT  9/29/2011    Performed by YIFAN PERDOMO at SURGERY [facility]    URETEROSCOPY  9/29/2011    Performed by YIFAN PREDOMO at SURGERY [facility]    LASERTRIPSY  9/29/2011    Performed by YIFAN PERDOMO at SURGERY [facility]    LITHOTRIPSY         Screening:  In the last six months have you experienced any leakage of urine? No    Depression Screening  Little interest or pleasure in doing things?  0 - not at all  Feeling down, depressed , or hopeless? 0 - not at all  Patient Health Questionnaire Score: 0     If depressive symptoms identified deferred to follow up visit unless specifically addressed in assessment and plan.    Interpretation of PHQ-9 Total Score   Score Severity   1-4 No Depression   5-9 Mild Depression   10-14 Moderate Depression   15-19 Moderately Severe Depression   20-27 Severe Depression    Screening for Cognitive Impairment  Do you or any of your friends or  family members have any concern about your memory? No  Three Minute Recall (Leader, Season, Table) 3/3    Tyler clock face with all 12 numbers and set the hands to show 10 minutes after 11.  Yes 5/5  Cognitive concerns identified deferred for follow up unless specifically addressed in assessment and plan.    Fall Risk Assessment  Has the patient had two or more falls in the last year or any fall with injury in the last year?  No    Safety Assessment  Do you always wear your seatbelt?  Yes  Any changes to home needed to function safely? No  Difficulty hearing.  Yes  Patient counseled about all safety risks that were identified.    Functional Assessment ADLs  Are there any barriers preventing you from cooking for yourself or meeting nutritional needs?  No.    Are there any barriers preventing you from driving safely or obtaining transportation?  No.    Are there any barriers preventing you from using a telephone or calling for help?  No    Are there any barriers preventing you from shopping?  No.    Are there any barriers preventing you from taking care of your own finances?  No    Are there any barriers preventing you from managing your medications?  No    Are there any barriers preventing you from showering, bathing or dressing yourself? No    Are there any barriers preventing you from doing housework or laundry? No  Are there any barriers preventing you from using the toilet?No  Are you currently engaging in any exercise or physical activity?  Yes.      Self-Assessment of Health  What is your perception of your health? Good  Do you sleep more than six hours a night? Yes  In the past 7 days, how much did pain keep you from doing your normal work? None  Do you spend quality time with family or friends (virtually or in person)? Yes  Do you usually eat a heart healthy diet that constists of a variety of fruits, vegetables, whole grains and fiber? Yes  Do you eat foods high in fat and/or Fast Food more than three times  per week? No    Advance Care Planning  Do you have an Advance Directive, Living Will, Durable Power of , or POLST? No                 Health Maintenance Summary            Ordered - Bone Density Scan (Every 5 Years) Ordered on 6/3/2024      No completion history exists for this topic.              Overdue - Cervical Cancer Screening (Every 3 Years) Never done      No completion history exists for this topic.              Overdue - HIV Screening (Once) Never done      No completion history exists for this topic.              Overdue - Hepatitis C Screening (Once) Never done      No completion history exists for this topic.              Ordered - Colorectal Cancer Screening (View Topic Details) Ordered on 6/3/2024      No completion history exists for this topic.              Overdue - Zoster (Shingles) Vaccines (2 of 3) Overdue since 6/29/2012 05/04/2012  Imm Admin: Zoster Vaccine Live (ZVL) (Zostavax) - HISTORICAL DATA              Overdue - COVID-19 Vaccine (3 - 2023-24 season) Overdue since 9/1/2023 11/18/2022  Imm Admin: MODERNA BIVALENT BOOSTER SARS-COV-2 VACCINE (6+)    01/11/2021  Imm Admin: PFIZER PURPLE CAP SARS-COV-2 VACCINATION (12+)    12/20/2020  Imm Admin: PFIZER PURPLE CAP SARS-COV-2 VACCINATION (12+)              Overdue - Pneumococcal Vaccine: 65+ Years (1 of 1 - PCV) Never done      No completion history exists for this topic.              Influenza Vaccine (Season Ended) Next due on 9/1/2024 11/18/2022  Imm Admin: Influenza Vaccine Quad Inj (Pf)    11/30/2021  Imm Admin: Influenza Vac Subunit Quad Inj (Pf)    09/21/2020  Imm Admin: Influenza Vaccine Quad Inj (Pf)    09/24/2019  Imm Admin: Influenza Vaccine Quad Inj (Pf)    09/30/2018  Imm Admin: Influenza Vaccine Quad Inj (Pf)    Only the first 5 history entries have been loaded, but more history exists.              Mammogram (Every 2 Years) Next due on 1/3/2025      01/03/2023  MA-SCREENING MAMMO BILAT W/TOMOSYNTHESIS  "W/CAD    06/12/2020  MA-SCREENING MAMMO BILAT W/TOMOSYNTHESIS W/CAD    04/13/2018  MA-MAMMO SCREENING BILAT W/TERENCE W/CAD    04/15/2016  MA-SCREEN MAMMO W/CAD-BILAT    05/02/2014  MA-SCREENING MAMMOGRAM W/ CAD    Only the first 5 history entries have been loaded, but more history exists.              IMM DTaP/Tdap/Td Vaccine (3 - Td or Tdap) Next due on 3/29/2029      03/29/2019  Imm Admin: TD Vaccine    01/23/2009  Imm Admin: Tdap Vaccine              Hepatitis A Vaccine (Hep A) (Series Information) Aged Out      No completion history exists for this topic.              Hepatitis B Vaccine (Hep B) (Series Information) Aged Out      No completion history exists for this topic.              HPV Vaccines (Series Information) Aged Out      No completion history exists for this topic.              Polio Vaccine (Inactivated Polio) (Series Information) Aged Out      No completion history exists for this topic.              Meningococcal Immunization (Series Information) Aged Out      No completion history exists for this topic.                    Patient Care Team:  Alona Rawls M.D. as PCP - General (Family Medicine)      Financial Resource Strain: Low Risk  (6/3/2024)    Overall Financial Resource Strain (CARDIA)     Difficulty of Paying Living Expenses: Not hard at all      Transportation Needs: No Transportation Needs (6/3/2024)    PRAPARE - Transportation     Lack of Transportation (Medical): No     Lack of Transportation (Non-Medical): No      Food Insecurity: No Food Insecurity (6/3/2024)    Hunger Vital Sign     Worried About Running Out of Food in the Last Year: Never true     Ran Out of Food in the Last Year: Never true        Encounter Vitals  Blood Pressure : (!) 140/82  O2 Delivery Device: None - Room Air  Weight: 90.7 kg (200 lb)  Height: 167.6 cm (5' 6\")  BMI (Calculated): 32.28  Pain Score: No pain  DME  O2 Delivery Device: None - Room Air     Alert, oriented in no acute distress.  Eye contact is " good, speech goal directed, affect calm.    Assessment and Plan. The following treatment and monitoring plan is recommended:  Elevated blood pressure reading in office with white coat syndrome, without diagnosis of hypertension  Chronic, stable. The patient reports that she checks her blood pressure at home and it is usually 130's over 80's. No current treatment. Advised that the patient record her blood pressure readings and bring it to her next PCP appointment.  Follow-up at least annually.      Gastroesophageal reflux disease  Chronic, stable. The patient reports that her symptoms are well controlled with current medication. Continue with current defined treatment plan: pantoprazole (PROTONIX) 40 MG Tablet Delayed Response. Follow-up at least annually.      Hypothyroidism  Chronic, stable. The patient denies any symptoms at this time. Continue with current defined treatment plan: levothyroxine (SYNTHROID) 137 MCG Tab . Follow-up at least annually.      BMI 32.0-32.9,adult  Chronic, stable. The patient goes to the gym regularly. Discussed eating a diet that contains many vegetables, fruits, and whole grains; includes low-fat dairy products, poultry, fish, beans, non-tropical vegetable oils and nuts; and limit intake of sweets, sugar-sweetened drinks and red  meats.  Follow-up at least annually.      Hyperlipidemia  Chronic, stable. No current treatment. Discussed heart healthy diet and regular exercise. Follow-up at least annually.      Screening for colorectal cancer  Discussed the importance of regular colorectal cancer screening.  Screening options discussed.  The patient verbalized understanding and agreement.  She prefers to proceed with cologuard screening at this time.  Cologuard order placed.       Encounter for osteoporosis screening in asymptomatic postmenopausal patient  Discussed with the patient the importance of getting regular osteoporosis screenings. The patient verbalized understanding and  agreement.  Orders for DS-BONE DENSITY STUDY (DEXA) placed.      Services suggested: No services needed at this time  Health Care Screening: Age-appropriate preventive services recommended by USPTF and ACIP covered by Medicare were discussed today. Services ordered if indicated and agreed upon by the patient.  Referrals offered: Community-based lifestyle interventions to reduce health risks and promote self-management and wellness, fall prevention, nutrition, physical activity, tobacco-use cessation, weight loss, and mental health services as per orders if indicated.    Discussion today about general wellness and lifestyle habits:    Prevent falls and reduce trip hazards; Cautioned about securing or removing rugs.  Have a working fire alarm and carbon monoxide detector.  Engage in regular physical activity and social activities.    Follow-up: Return for appointment with Primary Care Provider as needed.

## 2024-06-04 PROBLEM — Z13.820 ENCOUNTER FOR OSTEOPOROSIS SCREENING IN ASYMPTOMATIC POSTMENOPAUSAL PATIENT: Status: ACTIVE | Noted: 2024-06-04

## 2024-06-04 PROBLEM — Z78.0 ENCOUNTER FOR OSTEOPOROSIS SCREENING IN ASYMPTOMATIC POSTMENOPAUSAL PATIENT: Status: ACTIVE | Noted: 2024-06-04

## 2024-06-04 PROBLEM — Z12.11 SCREENING FOR COLORECTAL CANCER: Status: ACTIVE | Noted: 2024-06-04

## 2024-06-04 PROBLEM — Z12.12 SCREENING FOR COLORECTAL CANCER: Status: ACTIVE | Noted: 2024-06-04

## 2024-06-04 NOTE — ASSESSMENT & PLAN NOTE
Discussed the importance of regular colorectal cancer screening.  Screening options discussed.  The patient verbalized understanding and agreement.  She prefers to proceed with cologuard screening at this time.  Cologuard order placed.

## 2024-06-04 NOTE — ASSESSMENT & PLAN NOTE
Chronic, stable. No current treatment. Discussed heart healthy diet and regular exercise. Follow-up at least annually.

## 2024-06-04 NOTE — ASSESSMENT & PLAN NOTE
Chronic, stable. The patient goes to the gym regularly. Discussed eating a diet that contains many vegetables, fruits, and whole grains; includes low-fat dairy products, poultry, fish, beans, non-tropical vegetable oils and nuts; and limit intake of sweets, sugar-sweetened drinks and red  meats.  Follow-up at least annually.

## 2024-07-11 ENCOUNTER — HOSPITAL ENCOUNTER (OUTPATIENT)
Dept: RADIOLOGY | Facility: MEDICAL CENTER | Age: 64
End: 2024-07-11
Payer: MEDICARE

## 2024-07-11 DIAGNOSIS — Z13.820 ENCOUNTER FOR OSTEOPOROSIS SCREENING IN ASYMPTOMATIC POSTMENOPAUSAL PATIENT: ICD-10-CM

## 2024-07-11 DIAGNOSIS — Z78.0 ENCOUNTER FOR OSTEOPOROSIS SCREENING IN ASYMPTOMATIC POSTMENOPAUSAL PATIENT: ICD-10-CM

## 2024-07-11 PROCEDURE — 77080 DXA BONE DENSITY AXIAL: CPT

## 2024-07-15 DIAGNOSIS — K58.8 OTHER IRRITABLE BOWEL SYNDROME: ICD-10-CM

## 2024-07-16 RX ORDER — SERTRALINE HYDROCHLORIDE 100 MG/1
TABLET, FILM COATED ORAL
Qty: 135 TABLET | Refills: 0 | Status: SHIPPED | OUTPATIENT
Start: 2024-07-16

## 2024-08-20 ENCOUNTER — OFFICE VISIT (OUTPATIENT)
Dept: MEDICAL GROUP | Facility: CLINIC | Age: 64
End: 2024-08-20
Payer: MEDICARE

## 2024-08-20 VITALS
HEIGHT: 65 IN | DIASTOLIC BLOOD PRESSURE: 79 MMHG | WEIGHT: 204 LBS | RESPIRATION RATE: 16 BRPM | SYSTOLIC BLOOD PRESSURE: 130 MMHG | TEMPERATURE: 99.1 F | BODY MASS INDEX: 33.99 KG/M2 | OXYGEN SATURATION: 96 % | HEART RATE: 70 BPM

## 2024-08-20 DIAGNOSIS — N20.1 RIGHT URETERAL STONE: ICD-10-CM

## 2024-08-20 DIAGNOSIS — M54.30 SCIATICA, UNSPECIFIED LATERALITY: ICD-10-CM

## 2024-08-20 DIAGNOSIS — H93.13 TINNITUS OF BOTH EARS: ICD-10-CM

## 2024-08-20 DIAGNOSIS — G62.9 NEUROPATHY: ICD-10-CM

## 2024-08-20 PROCEDURE — 99213 OFFICE O/P EST LOW 20 MIN: CPT | Mod: GE

## 2024-08-20 NOTE — PROGRESS NOTES
Subjective:     CC:   Chief Complaint   Patient presents with    Follow-Up     Check up       HPI:   CAMERON is a 64 y.o. 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  She works with heavy machinery, drills and high in light fixtures.  The neuropathy started burning roughly 6 months ago and is currently being managed with stretches and physical therapy which she learned as a physical therapy tachycardia.     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  We discussed proper ear protection at work but unfortunately this is not allowed due to safety reasons.  She is allowed to have 1 earbud in an ear which she does utilize.     Sciatica    Patient reports she is currently suffering from sciatica.  As a kid she had a back injury where she reports she blew a disc and cracked her vertebrae roughly 50 years ago.  She has been doing stretches and workouts with good benefit in her sciatica.  She is not interested in any imaging or referrals at this time.     Right Ureteral Stone    Patient reports she has a longstanding history with kidney stones.  Her last one was Easter of 2024.  She is established with urology [place].  She currently feels like she has bilateral kidney stones coming on.  She denies dysuria and hematuria in addition to fevers.  She does report that her urine is dark.  She has been filtering her urine without a kidney stone noted so far.  Reports that she still has some ketorolac leftover from the last down.         Current Outpatient Medications Ordered in Epic   Medication Sig Dispense Refill    sertraline (ZOLOFT) 100 MG Tab TAKE 1 & 1/2 (ONE & ONE-HALF) TABLETS BY MOUTH ONCE DAILY IN THE MORNING FOR   Tablet 0    levothyroxine (SYNTHROID) 137 MCG Tab Take 1 Tablet by mouth every morning on an empty stomach. 90 Tablet  "3    pantoprazole (PROTONIX) 40 MG Tablet Delayed Response Take 1 Tablet by mouth 2 times a day. 90 Tablet 3    triamcinolone acetonide (KENALOG) 0.1 % Cream Apply 1 Application topically as needed (itching). Indications: Allergic Contact Dermatitis 80 g 1    Probiotic Product (PROBIOTIC DAILY PO) Take  by mouth.      aspirin (ASA) 81 MG Chew Tab chewable tablet Chew 1 Tablet every day. Indications: Fever, blood thinner      VITAMIN B COMPLEX-C PO Take 1 Tab by mouth every morning.      Cholecalciferol (VITAMIN D) 400 UNIT Tab Take 1 Tablet by mouth every morning.       No current [facility]-ordered facility-administered medications on file.       ROS:  Negative except for above in HPI    Objective:     Exam:  /79   Pulse 70   Temp 37.3 °C (99.1 °F) (Temporal)   Resp 16   Ht 1.651 m (5' 5\")   Wt 92.5 kg (204 lb)   LMP 04/15/2013   SpO2 96%   BMI 33.95 kg/m²  Body mass index is 33.95 kg/m².    Gen: Alert and oriented, No apparent distress.  HEENT: NCAT, MMM  Lungs: Normal effort, CTA bilaterally, no wheezes, rhonchi, or rales  CV: Regular rate and rhythm. No murmurs, rubs, or gallops. Radial pulses palpable bilat  Abd: Soft, non-distended, no guarding, no rebound,   Ext: No clubbing, cyanosis.  Neuro: Non-focal    Labs: No new labs    Assessment & Plan:     64 y.o. female with the following -     Problem List Items Addressed This Visit       Right ureteral stone     Discussed the signs and symptoms of pyelonephritis with the patient and urged her to seek medical management immediately if she starts to exhibit the symptoms.  She reports that she has a longstanding history with kidney stones and feels comfortable with managing them at home right now.  She declines any prescription for Flomax, Toradol, or antinausea medications as she has leftovers from prior kidney stones.  She demonstrated understanding for the importance of her to seek medical attention if this turns into infection.  Will continue to monitor " at this time.         Neuropathy     Discussed possible pharmacological options which the patient declined.  She would like to manage her neuropathy with her current conservative regimen of stretching/physical therapy exercises.  Will continue to monitor.         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  Can consider future audiologist or ENT referrals.  For the meantime she will continue to utilize 1 year blood         Sciatica     Patient reports history of sciatica, longstanding/chronic since an injury roughly 50 years ago as a child.  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  Will continue to monitor and manage as needed.            No follow-ups on file.      Lobo Piña MD  Resident - PGY2  [facility]

## 2024-08-21 NOTE — ASSESSMENT & PLAN NOTE
Discussed the signs and symptoms of pyelonephritis with the patient and urged her to seek medical management immediately if she starts to exhibit the symptoms.  She reports that she has a longstanding history with kidney stones and feels comfortable with managing them at home right now.  She declines any prescription for Flomax, Toradol, or antinausea medications as she has leftovers from prior kidney stones.  She demonstrated understanding for the importance of her to seek medical attention if this turns into infection.  Will continue to monitor at this time.

## 2024-08-21 NOTE — ASSESSMENT & PLAN NOTE
Discussed possible pharmacological options which the patient declined.  She would like to manage her neuropathy with her current conservative regimen of stretching/physical therapy exercises.  Will continue to monitor.

## 2024-08-21 NOTE — ASSESSMENT & PLAN NOTE
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  Can consider future audiologist or ENT referrals.  For the meantime she will continue to utilize 1 year blood

## 2024-08-21 NOTE — ASSESSMENT & PLAN NOTE
Patient reports history of sciatica, longstanding/chronic since an injury roughly 50 years ago as a child.  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  Will continue to monitor and manage as needed.

## 2024-08-26 DIAGNOSIS — E03.9 HYPOTHYROIDISM, UNSPECIFIED TYPE: ICD-10-CM

## 2024-08-26 DIAGNOSIS — K58.8 OTHER IRRITABLE BOWEL SYNDROME: ICD-10-CM

## 2024-08-27 NOTE — TELEPHONE ENCOUNTER
Received request via: Patient    Was the patient seen in the last year in this department? Yes    Does the patient have an active prescription (recently filled or refills available) for medication(s) requested? No    Pharmacy Name: [facility]    Does the patient have snf Plus and need 100-day supply? (This applies to ALL medications) Yes, quantity updated to 100 days

## 2024-08-29 RX ORDER — LEVOTHYROXINE SODIUM 137 UG/1
137 TABLET ORAL
Qty: 100 TABLET | Refills: 0 | Status: SHIPPED | OUTPATIENT
Start: 2024-08-29

## 2024-08-29 RX ORDER — SERTRALINE HYDROCHLORIDE 100 MG/1
TABLET, FILM COATED ORAL
Qty: 150 TABLET | Refills: 0 | Status: SHIPPED | OUTPATIENT
Start: 2024-08-29

## 2024-12-04 DIAGNOSIS — E03.9 HYPOTHYROIDISM, UNSPECIFIED TYPE: ICD-10-CM

## 2024-12-05 NOTE — TELEPHONE ENCOUNTER
Received request via: Pharmacy    Was the patient seen in the last year in this department? Yes    Does the patient have an active prescription (recently filled or refills available) for medication(s) requested? No    Pharmacy Name:   [facility] [address] [place] [address], [place] [id number] [facility]       Does the patient have penitentiary Plus and need 100-day supply? (This applies to ALL medications) Yes, quantity updated to 100 days

## 2024-12-06 RX ORDER — LEVOTHYROXINE SODIUM 137 UG/1
137 TABLET ORAL
Qty: 100 TABLET | Refills: 0 | Status: SHIPPED | OUTPATIENT
Start: 2024-12-06

## 2025-01-17 DIAGNOSIS — K58.8 OTHER IRRITABLE BOWEL SYNDROME: ICD-10-CM

## 2025-01-17 NOTE — TELEPHONE ENCOUNTER
Received request via: Pharmacy    Was the patient seen in the last year in this department? Yes    Does the patient have an active prescription (recently filled or refills available) for medication(s) requested? No    Pharmacy Name:   [facility] [address] [place] [address], [place] [id number] [facility]     Does the patient have intermediate Plus and need 100-day supply? (This applies to ALL medications) Yes, quantity updated to 100 days

## 2025-01-18 RX ORDER — SERTRALINE HYDROCHLORIDE 100 MG/1
TABLET, FILM COATED ORAL
Qty: 150 TABLET | Refills: 0 | Status: SHIPPED | OUTPATIENT
Start: 2025-01-18

## 2025-03-12 DIAGNOSIS — K21.9 GASTROESOPHAGEAL REFLUX DISEASE WITHOUT ESOPHAGITIS: ICD-10-CM

## 2025-03-12 DIAGNOSIS — E03.9 HYPOTHYROIDISM, UNSPECIFIED TYPE: ICD-10-CM

## 2025-03-13 ENCOUNTER — OFFICE VISIT (OUTPATIENT)
Dept: FAMILY PLANNING/WOMEN'S HEALTH CLINIC | Facility: PHYSICIAN GROUP | Age: 65
End: 2025-03-13
Attending: FAMILY MEDICINE
Payer: MEDICARE

## 2025-03-13 VITALS
SYSTOLIC BLOOD PRESSURE: 122 MMHG | OXYGEN SATURATION: 97 % | DIASTOLIC BLOOD PRESSURE: 74 MMHG | HEART RATE: 84 BPM | HEIGHT: 67 IN | TEMPERATURE: 98.1 F | BODY MASS INDEX: 31.2 KG/M2 | WEIGHT: 198.8 LBS | RESPIRATION RATE: 16 BRPM

## 2025-03-13 DIAGNOSIS — E03.9 HYPOTHYROIDISM, UNSPECIFIED TYPE: ICD-10-CM

## 2025-03-13 DIAGNOSIS — K21.9 GASTROESOPHAGEAL REFLUX DISEASE, UNSPECIFIED WHETHER ESOPHAGITIS PRESENT: ICD-10-CM

## 2025-03-13 DIAGNOSIS — Z23 NEED FOR VACCINATION: ICD-10-CM

## 2025-03-13 DIAGNOSIS — E78.5 HYPERLIPIDEMIA, UNSPECIFIED HYPERLIPIDEMIA TYPE: ICD-10-CM

## 2025-03-13 DIAGNOSIS — Z12.31 SCREENING MAMMOGRAM FOR BREAST CANCER: ICD-10-CM

## 2025-03-13 PROBLEM — Z78.0 ENCOUNTER FOR OSTEOPOROSIS SCREENING IN ASYMPTOMATIC POSTMENOPAUSAL PATIENT: Status: RESOLVED | Noted: 2024-06-04 | Resolved: 2025-03-13

## 2025-03-13 PROBLEM — Z13.820 ENCOUNTER FOR OSTEOPOROSIS SCREENING IN ASYMPTOMATIC POSTMENOPAUSAL PATIENT: Status: RESOLVED | Noted: 2024-06-04 | Resolved: 2025-03-13

## 2025-03-13 PROBLEM — Z12.11 SCREENING FOR COLORECTAL CANCER: Status: RESOLVED | Noted: 2024-06-04 | Resolved: 2025-03-13

## 2025-03-13 PROBLEM — Z12.12 SCREENING FOR COLORECTAL CANCER: Status: RESOLVED | Noted: 2024-06-04 | Resolved: 2025-03-13

## 2025-03-13 PROCEDURE — 90662 IIV NO PRSV INCREASED AG IM: CPT

## 2025-03-13 PROCEDURE — 1125F AMNT PAIN NOTED PAIN PRSNT: CPT

## 2025-03-13 PROCEDURE — 3078F DIAST BP <80 MM HG: CPT

## 2025-03-13 PROCEDURE — G0439 PPPS, SUBSEQ VISIT: HCPCS | Mod: 25

## 2025-03-13 PROCEDURE — 3074F SYST BP LT 130 MM HG: CPT

## 2025-03-13 PROCEDURE — G0008 ADMIN INFLUENZA VIRUS VAC: HCPCS

## 2025-03-13 RX ORDER — PANTOPRAZOLE SODIUM 40 MG/1
40 TABLET, DELAYED RELEASE ORAL 2 TIMES DAILY
Qty: 200 TABLET | Refills: 3 | Status: SHIPPED | OUTPATIENT
Start: 2025-03-13

## 2025-03-13 RX ORDER — LEVOTHYROXINE SODIUM 137 UG/1
137 TABLET ORAL
Qty: 100 TABLET | Refills: 3 | Status: SHIPPED | OUTPATIENT
Start: 2025-03-13

## 2025-03-13 SDOH — HEALTH STABILITY: PHYSICAL HEALTH: ON AVERAGE, HOW MANY MINUTES DO YOU ENGAGE IN EXERCISE AT THIS LEVEL?: 150+ MIN

## 2025-03-13 SDOH — ECONOMIC STABILITY: HOUSING INSECURITY: IN THE LAST 12 MONTHS, WAS THERE A TIME WHEN YOU WERE NOT ABLE TO PAY THE MORTGAGE OR RENT ON TIME?: NO

## 2025-03-13 SDOH — HEALTH STABILITY: PHYSICAL HEALTH: ON AVERAGE, HOW MANY DAYS PER WEEK DO YOU ENGAGE IN MODERATE TO STRENUOUS EXERCISE (LIKE A BRISK WALK)?: 5 DAYS

## 2025-03-13 SDOH — ECONOMIC STABILITY: FOOD INSECURITY: HOW HARD IS IT FOR YOU TO PAY FOR THE VERY BASICS LIKE FOOD, HOUSING, MEDICAL CARE, AND HEATING?: NOT HARD AT ALL

## 2025-03-13 SDOH — ECONOMIC STABILITY: FOOD INSECURITY: WITHIN THE PAST 12 MONTHS, THE FOOD YOU BOUGHT JUST DIDN'T LAST AND YOU DIDN'T HAVE MONEY TO GET MORE.: NEVER TRUE

## 2025-03-13 SDOH — ECONOMIC STABILITY: HOUSING INSECURITY: AT ANY TIME IN THE PAST 12 MONTHS, WERE YOU HOMELESS OR LIVING IN A SHELTER (INCLUDING NOW)?: NO

## 2025-03-13 SDOH — ECONOMIC STABILITY: TRANSPORTATION INSECURITY: IN THE PAST 12 MONTHS, HAS LACK OF TRANSPORTATION KEPT YOU FROM MEDICAL APPOINTMENTS OR FROM GETTING MEDICATIONS?: NO

## 2025-03-13 SDOH — ECONOMIC STABILITY: FOOD INSECURITY: WITHIN THE PAST 12 MONTHS, YOU WORRIED THAT YOUR FOOD WOULD RUN OUT BEFORE YOU GOT THE MONEY TO BUY MORE.: NEVER TRUE

## 2025-03-13 ASSESSMENT — ENCOUNTER SYMPTOMS: GENERAL WELL-BEING: GOOD

## 2025-03-13 ASSESSMENT — PATIENT HEALTH QUESTIONNAIRE - PHQ9
1. LITTLE INTEREST OR PLEASURE IN DOING THINGS: SEVERAL DAYS
5. POOR APPETITE OR OVEREATING: 0 - NOT AT ALL
2. FEELING DOWN, DEPRESSED, IRRITABLE, OR HOPELESS: SEVERAL DAYS
CLINICAL INTERPRETATION OF PHQ2 SCORE: 2
SUM OF ALL RESPONSES TO PHQ QUESTIONS 1-9: 2

## 2025-03-13 ASSESSMENT — ACTIVITIES OF DAILY LIVING (ADL)
BATHING_REQUIRES_ASSISTANCE: 0
LACK_OF_TRANSPORTATION: NO

## 2025-03-13 ASSESSMENT — PAIN SCALES - GENERAL: PAINLEVEL_OUTOF10: 1=MINIMAL PAIN

## 2025-03-13 NOTE — PROGRESS NOTES
Comprehensive Health Assessment Program     CAMERON JJ is a 65 y.o. here for her comprehensive health assessment.    Patient Active Problem List    Diagnosis Date Noted    Screening mammogram for breast cancer 03/13/2025    Need for vaccination 03/13/2025    Neuropathy 08/20/2024    Tinnitus of both ears 08/20/2024    Sciatica 08/20/2024    Other irritable bowel syndrome 08/16/2023    Gastroesophageal reflux disease 08/16/2023    Hyperlipidemia 08/16/2023    Hypothyroidism 08/16/2023    Elevated blood pressure reading in office with white coat syndrome, without diagnosis of hypertension 08/16/2023    Obstructive sleep apnea syndrome in adult 03/29/2019    BMI 32.0-32.9,adult 07/18/2018    Right ureteral stone 05/24/2018    Folliculitis 04/26/2017       Current Outpatient Medications   Medication Sig Dispense Refill    levothyroxine (SYNTHROID) 137 MCG Tab Take 1 Tablet by mouth every morning on an empty stomach. 100 Tablet 3    pantoprazole (PROTONIX) 40 MG Tablet Delayed Response Take 1 Tablet by mouth 2 times a day. 200 Tablet 3    sertraline (ZOLOFT) 100 MG Tab TAKE 1 & 1/2 (ONE & ONE-HALF) TABLETS BY MOUTH ONCE DAILY IN THE MORNING FOR   Tablet 0    triamcinolone acetonide (KENALOG) 0.1 % Cream Apply 1 Application topically as needed (itching). Indications: Allergic Contact Dermatitis 80 g 1    Probiotic Product (PROBIOTIC DAILY PO) Take  by mouth.      aspirin (ASA) 81 MG Chew Tab chewable tablet Chew 1 Tablet every day. Indications: Fever, blood thinner      VITAMIN B COMPLEX-C PO Take 1 Tab by mouth every morning.      Cholecalciferol (VITAMIN D) 400 UNIT Tab Take 1 Tablet by mouth every morning.       No current facility-administered medications for this visit.          Current supplements as per medication list.     Allergies:   Bactrim, Bee, and Ampicillin  Social History     Tobacco Use    Smoking status: Never    Smokeless tobacco: Never   Vaping Use    Vaping status: Never Used    Substance Use Topics    Alcohol use: Yes     Comment: 6 PER YEAR     Drug use: No     History reviewed. No pertinent family history.  CAMERON  has a past medical history of Anesthesia, Arrhythmia, Dental disorder, GERD (gastroesophageal reflux disease), Hashimoto's disease, Heart burn, Heart murmur, History of MRSA infection (2016), IBS (irritable bowel syndrome), Indigestion, Kidney calculi, Other specified disorder of intestines, Sleep apnea, and Snoring.   Past Surgical History:   Procedure Laterality Date    CYSTOSCOPY STENT PLACEMENT  5/23/2018    Procedure: CYSTOSCOPY;  Surgeon: Yifan Perdomo M.D.;  Location: SURGERY [facility];  Service: Urology    URETEROSCOPY Right 5/23/2018    Procedure: URETEROSCOPY;  Surgeon: Yifan Perdomo M.D.;  Location: SURGERY [facility];  Service: Urology    LASERTRIPSY Right 5/23/2018    Procedure: LASERTRIPSY- LITHO;  Surgeon: Yifan Perdomo M.D.;  Location: SURGERY [facility];  Service: Urology    JIMMIE BY LAPAROSCOPY  7/17/2013    Performed by John Carrasco M.D. at SURGERY [facility]    CYSTOSCOPY STENT PLACEMENT  9/29/2011    Performed by YIFAN PERDOMO at SURGERY [facility]    URETEROSCOPY  9/29/2011    Performed by YIFAN PERDOMO at SURGERY [facility]    LASERTRIPSY  9/29/2011    Performed by YIFAN PERDOMO at SURGERY [facility]    LITHOTRIPSY         Screening:  In the last six months have you experienced any leakage of urine? Yes    Depression Screening  Little interest or pleasure in doing things?  1 - several days  Feeling down, depressed , or hopeless? 1 - several days  Trouble falling or staying asleep, or sleeping too much?  0 - not at all  Feeling tired or having little energy?  0 - not at all  Poor appetite or overeating?  0 - not at all  Feeling bad about yourself - or that you are a failure or have let yourself or your family down? 0 - not at all  Trouble concentrating on things, such as reading the newspaper or watching  television? 0 - not at all  Moving or speaking so slowly that other people could have noticed.  Or the opposite - being so fidgety or restless that you have been moving around a lot more than usual?  0 - not at all  Thoughts that you would be better off dead, or of hurting yourself?  0 - not at all  Patient Health Questionnaire Score: 2    If depressive symptoms identified deferred to follow up visit unless specifically addressed in assessment and plan.    Interpretation of PHQ-9 Total Score   Score Severity   1-4 No Depression   5-9 Mild Depression   10-14 Moderate Depression   15-19 Moderately Severe Depression   20-27 Severe Depression    Screening for Cognitive Impairment  Do you or any of your friends or family members have any concern about your memory? No  Three Minute Recall (Village, Kitchen, Baby) 3/3    Tyler clock face with all 12 numbers and set the hands to show 10 minutes past 11.  Yes 5  Cognitive concerns identified deferred for follow up unless specifically addressed in assessment and plan.    Fall Risk Assessment  Has the patient had two or more falls in the last year or any fall with injury in the last year?  No    Safety Assessment  Do you always wear your seatbelt?  Yes  Any changes to home needed to function safely? No  Difficulty hearing.  Yes  Patient counseled about all safety risks that were identified.    Functional Assessment ADLs  Are there any barriers preventing you from cooking for yourself or meeting nutritional needs?  No.    Are there any barriers preventing you from driving safely or obtaining transportation?  No.    Are there any barriers preventing you from using a telephone or calling for help?  No    Are there any barriers preventing you from shopping?  No.    Are there any barriers preventing you from taking care of your own finances?  No    Are there any barriers preventing you from managing your medications?  No    Are there any barriers preventing you from showering,  bathing or dressing yourself? No    Are there any barriers preventing you from doing housework or laundry? No    Are there any barriers preventing you from using the toilet?No    Are you currently engaging in any exercise or physical activity?  Yes.      Self-Assessment of Health  What is your perception of your health? Good    Do you sleep more than six hours a night? Yes    In the past 7 days, how much did pain keep you from doing your normal work? Some    Do you spend quality time with family or friends (virtually or in person)? Yes    Do you usually eat a heart healthy diet that constists of a variety of fruits, vegetables, whole grains and fiber? Yes    Do you eat foods high in fat and/or Fast Food more than three times per week? No    How concerned are you that your medical conditions are not being well managed? Not at all    Are you worried that in the next 2 months, you may not have stable housing that you own, rent, or stay in as part of a household? No        Advance Care Planning  Do you have an Advance Directive, Living Will, Durable Power of , or POLST? No                 Health Maintenance Summary            Current Care Gaps       Zoster (Shingles) Vaccines (2 of 3) Overdue since 6/29/2012 05/04/2012  Imm Admin: Zoster Vaccine Live (ZVL) (Zostavax) - HISTORICAL DATA              COVID-19 Vaccine (3 - 2024-25 season) Overdue since 9/1/2024 11/18/2022  Imm Admin: MODERNA BIVALENT BOOSTER SARS-COV-2 VACCINE (6+)    01/11/2021  Imm Admin: PFIZER PURPLE CAP SARS-COV-2 VACCINATION (12+)    12/20/2020  Imm Admin: PFIZER PURPLE CAP SARS-COV-2 VACCINATION (12+)              Hepatitis C Screening (Once) Never done     No completion history exists for this topic.              Pneumococcal Vaccine: 50+ Years (1 of 1 - PCV) Never done     No completion history exists for this topic.                      Needs Review       Colorectal Cancer Screening (View Topic Details) Patient-reported  completion      06/03/2024  Order placed for COLOGUARD COLON CANCER SCREENING by ABELINO FalconRSHERMAN.                      Awaiting Completion       Mammogram (Yearly) Order placed this encounter      03/13/2025  Order placed for MA-SCREENING MAMMO BILAT W/TOMOSYNTHESIS W/CAD by ABELINO FalconRInoN.    01/03/2023  MA-SCREENING MAMMO BILAT W/TOMOSYNTHESIS W/CAD    06/12/2020  MA-SCREENING MAMMO BILAT W/TOMOSYNTHESIS W/CAD    04/13/2018  MA-MAMMO SCREENING BILAT W/TERENCE W/CAD    04/15/2016  MA-SCREEN MAMMO W/CAD-BILAT     Only the first 5 history entries have been loaded, but more history exists.            Influenza Vaccine (1) Order placed this encounter      03/13/2025  Order placed for Influenza Vaccine, High Dose (65+ Only) by ABELINO FalconRInoNIno    11/18/2022  Imm Admin: Influenza Vaccine Quad Inj (Pf)    11/30/2021  Imm Admin: Influenza Vac Subunit Quad Inj (Pf)    09/21/2020  Imm Admin: Influenza Vaccine Quad Inj (Pf)    09/24/2019  Imm Admin: Influenza Vaccine Quad Inj (Pf)      Only the first 5 history entries have been loaded, but more history exists.                      Upcoming       Annual Wellness Visit (Yearly) Next due on 3/13/2026      03/13/2025  Level of Service: TX ANNUAL WELLNESS VISIT-INCLUDES PPPS SUBSEQUE*    06/03/2024  Level of Service: TX ANNUAL WELLNESS VISIT-INCLUDES PPPS SUBSEQUE*              IMM DTaP/Tdap/Td Vaccine (3 - Td or Tdap) Next due on 3/29/2029      03/29/2019  Imm Admin: TD Vaccine    01/23/2009  Imm Admin: Tdap Vaccine              Bone Density Scan (Every 5 Years) Next due on 7/11/2029 07/11/2024  DS-BONE DENSITY STUDY (DEXA)                      Completed or No Longer Recommended       Hepatitis A Vaccine (Hep A) (Series Information) Aged Out      No completion history exists for this topic.              Hepatitis B Vaccine (Hep B) (Series Information) Aged Out     No completion history exists for this topic.              HPV Vaccines (Series  "Information) Aged Out     No completion history exists for this topic.              Polio Vaccine (Inactivated Polio) (Series Information) Aged Out     No completion history exists for this topic.              Meningococcal Immunization (Series Information) Aged Out     No completion history exists for this topic.                            Patient Care Team:  Lobo Piña M.D. as PCP - General (Family Medicine)    Financial Resource Strain: Low Risk  (3/13/2025)    Overall Financial Resource Strain (CARDIA)     Difficulty of Paying Living Expenses: Not hard at all      Transportation Needs: No Transportation Needs (3/13/2025)    PRAPARE - Transportation     Lack of Transportation (Medical): No     Lack of Transportation (Non-Medical): No      Food Insecurity: No Food Insecurity (3/13/2025)    Hunger Vital Sign     Worried About Running Out of Food in the Last Year: Never true     Ran Out of Food in the Last Year: Never true        Encounter Vitals  Temperature: 36.7 °C (98.1 °F)  Temp src: Temporal  Blood Pressure : 122/74  Pulse: 84  Respiration: 16  Pulse Oximetry: 97 %  Weight: 90.2 kg (198 lb 12.8 oz)  Height: 168.9 cm (5' 6.5\")  BMI (Calculated): 31.61  Pain Score: 1=Minimal Pain     ROS:  No fever, chills, nausea, vomiting, diarrhea, chest pain or shortness of breath. See HPI.    Physical Exam:  Constitutional: NAD  HENMT: NC/AT, PERRLA, EOMI, OP clear, TM's clear, no lymphadenopathy, no thyromegaly.  No JVD.  Cardiovascular: RRR, No m/r/g  Lungs: CTAB, no w/r/r  Extremities: 2+ DP, PT and Radial pulses bilaterally. No c/c/e  Skin: No legions notes  Neurologic: Alert & oriented x3, CN II-XII grossly intact    Assessment and Plan. The following treatment and monitoring plan is recommended:    Gastroesophageal reflux disease  Chronic, stable. The patient reports that her symptoms are well controlled with current medication. Continue with current defined treatment plan: pantoprazole (PROTONIX) 40 MG Tablet " Delayed Response. Follow-up at least annually.     Hypothyroidism  Chronic, stable. The patient denies any symptoms at this time. Continue with current defined treatment plan: levothyroxine (SYNTHROID) 137 MCG Tab . Follow-up at least annually.     Hyperlipidemia  Chronic, stable. No current treatment. Discussed heart healthy diet and regular exercise. Follow-up at least annually.     Screening mammogram for breast cancer  Discussed with the patient the importance of getting regular breast cancer screenings. The patient verbalized understanding and agreement.  Orders for MA-SCREENING MAMMO BILAT W/CAD  placed.       Need for vaccination  I have placed the below orders and discussed them with an approved delegated provider. The MA is performing the below orders under the direction of Dr. Peterson.      Services suggested: No services needed at this time  Health Care Screening: Age-appropriate preventive services recommended by USPTF and ACIP covered by Medicare were discussed today. Services ordered if indicated and agreed upon by the patient.  Referrals offered: Community-based lifestyle interventions to reduce health risks and promote self-management and wellness, fall prevention, nutrition, physical activity, tobacco-use cessation, weight loss, and mental health services as per orders if indicated.    Discussion today about general wellness and lifestyle habits:    Prevent falls and reduce trip hazards; Cautioned about securing or removing rugs.  Have a working fire alarm and carbon monoxide detector.  Engage in regular physical activity and social activities.    Follow-up: Return for appointment with Primary Care Provider as needed.

## 2025-03-13 NOTE — TELEPHONE ENCOUNTER
Received request via: Patient    Was the patient seen in the last year in this department? Yes    Does the patient have an active prescription (recently filled or refills available) for medication(s) requested? No    Pharmacy Name: [facility]    Does the patient have correction Plus and need 100-day supply? (This applies to ALL medications) Yes, quantity updated to 100 days

## 2025-03-13 NOTE — ASSESSMENT & PLAN NOTE
Chronic, stable. No current treatment.  Blood pressure today 122/74. The patient donates plasma and reports that her last few reading in their their office was 120's over 70's.  Follow-up at least annually.

## 2025-03-13 NOTE — TELEPHONE ENCOUNTER
Received request via: Patient    Was the patient seen in the last year in this department? Yes    Does the patient have an active prescription (recently filled or refills available) for medication(s) requested? No    Pharmacy Name: [facility]    Does the patient have assisted Plus and need 100-day supply? (This applies to ALL medications) Yes, quantity updated to 100 days

## 2025-03-13 NOTE — ASSESSMENT & PLAN NOTE
Chronic, stable. The patient denies any symptoms at this time. Continue with current defined treatment plan: levothyroxine (SYNTHROID) 137 MCG Tab . Follow-up at least annually.

## 2025-03-13 NOTE — ASSESSMENT & PLAN NOTE
I have placed the below orders and discussed them with an approved delegated provider. The MA is performing the below orders under the direction of Dr. Peterson.

## 2025-03-18 ENCOUNTER — TELEPHONE (OUTPATIENT)
Dept: FAMILY PLANNING/WOMEN'S HEALTH CLINIC | Facility: PHYSICIAN GROUP | Age: 65
End: 2025-03-18
Payer: MEDICARE

## 2025-04-25 DIAGNOSIS — K58.8 OTHER IRRITABLE BOWEL SYNDROME: ICD-10-CM

## 2025-04-25 NOTE — TELEPHONE ENCOUNTER
Received request via: Pharmacy    Was the patient seen in the last year in this department? Yes    Does the patient have an active prescription (recently filled or refills available) for medication(s) requested? No    Pharmacy Name:   [facility] [address] [place] [id number]  Phone: [phone] Fax: [phone]        Does the patient have half-way Plus and need 100-day supply? (This applies to ALL medications) Yes, quantity updated to 100 days

## 2025-04-28 RX ORDER — SERTRALINE HYDROCHLORIDE 100 MG/1
TABLET, FILM COATED ORAL
Qty: 100 TABLET | Refills: 0 | Status: SHIPPED | OUTPATIENT
Start: 2025-04-28

## 2025-05-28 ENCOUNTER — OFFICE VISIT (OUTPATIENT)
Dept: URGENT CARE | Facility: PHYSICIAN GROUP | Age: 65
End: 2025-05-28
Payer: MEDICARE

## 2025-05-28 ENCOUNTER — HOSPITAL ENCOUNTER (OUTPATIENT)
Facility: MEDICAL CENTER | Age: 65
End: 2025-05-28
Payer: MEDICARE

## 2025-05-28 ENCOUNTER — HOSPITAL ENCOUNTER (OUTPATIENT)
Dept: RADIOLOGY | Facility: MEDICAL CENTER | Age: 65
End: 2025-05-28
Payer: MEDICARE

## 2025-05-28 ENCOUNTER — HOSPITAL ENCOUNTER (OUTPATIENT)
Dept: LAB | Facility: MEDICAL CENTER | Age: 65
End: 2025-05-28
Payer: MEDICARE

## 2025-05-28 VITALS
WEIGHT: 200.73 LBS | BODY MASS INDEX: 32.26 KG/M2 | OXYGEN SATURATION: 95 % | HEIGHT: 66 IN | RESPIRATION RATE: 12 BRPM | DIASTOLIC BLOOD PRESSURE: 76 MMHG | HEART RATE: 72 BPM | TEMPERATURE: 97.9 F | SYSTOLIC BLOOD PRESSURE: 138 MMHG

## 2025-05-28 DIAGNOSIS — R10.9 FLANK PAIN: ICD-10-CM

## 2025-05-28 DIAGNOSIS — N20.0 KIDNEY STONE: ICD-10-CM

## 2025-05-28 DIAGNOSIS — R10.9 FLANK PAIN: Primary | ICD-10-CM

## 2025-05-28 LAB
ALBUMIN SERPL BCP-MCNC: 3.6 G/DL (ref 3.2–4.9)
ALBUMIN/GLOB SERPL: 1.4 G/DL
ALP SERPL-CCNC: 34 U/L (ref 30–99)
ALT SERPL-CCNC: 23 U/L (ref 2–50)
ANION GAP SERPL CALC-SCNC: 9 MMOL/L (ref 7–16)
APPEARANCE UR: NORMAL
AST SERPL-CCNC: 24 U/L (ref 12–45)
BILIRUB SERPL-MCNC: 0.4 MG/DL (ref 0.1–1.5)
BILIRUB UR STRIP-MCNC: NEGATIVE MG/DL
BUN SERPL-MCNC: 9 MG/DL (ref 8–22)
CALCIUM ALBUM COR SERPL-MCNC: 9.4 MG/DL (ref 8.5–10.5)
CALCIUM SERPL-MCNC: 9.1 MG/DL (ref 8.5–10.5)
CHLORIDE SERPL-SCNC: 105 MMOL/L (ref 96–112)
CO2 SERPL-SCNC: 27 MMOL/L (ref 20–33)
COLOR UR AUTO: NORMAL
CREAT SERPL-MCNC: 0.99 MG/DL (ref 0.5–1.4)
GFR SERPLBLD CREATININE-BSD FMLA CKD-EPI: 63 ML/MIN/1.73 M 2
GLOBULIN SER CALC-MCNC: 2.5 G/DL (ref 1.9–3.5)
GLUCOSE SERPL-MCNC: 95 MG/DL (ref 65–99)
GLUCOSE UR STRIP.AUTO-MCNC: NEGATIVE MG/DL
KETONES UR STRIP.AUTO-MCNC: NEGATIVE MG/DL
LEUKOCYTE ESTERASE UR QL STRIP.AUTO: NEGATIVE
NITRITE UR QL STRIP.AUTO: NEGATIVE
PH UR STRIP.AUTO: 6 [PH] (ref 5–8)
POTASSIUM SERPL-SCNC: 4.4 MMOL/L (ref 3.6–5.5)
PROT SERPL-MCNC: 6.1 G/DL (ref 6–8.2)
PROT UR QL STRIP: NEGATIVE MG/DL
RBC UR QL AUTO: NORMAL
SODIUM SERPL-SCNC: 141 MMOL/L (ref 135–145)
SP GR UR STRIP.AUTO: 1.02
UROBILINOGEN UR STRIP-MCNC: 0.2 MG/DL

## 2025-05-28 PROCEDURE — 87086 URINE CULTURE/COLONY COUNT: CPT

## 2025-05-28 PROCEDURE — 36415 COLL VENOUS BLD VENIPUNCTURE: CPT

## 2025-05-28 PROCEDURE — 80053 COMPREHEN METABOLIC PANEL: CPT

## 2025-05-28 PROCEDURE — 76775 US EXAM ABDO BACK WALL LIM: CPT

## 2025-05-28 RX ORDER — TAMSULOSIN HYDROCHLORIDE 0.4 MG/1
CAPSULE ORAL
Qty: 30 CAPSULE | Refills: 0 | Status: SHIPPED | OUTPATIENT
Start: 2025-05-28

## 2025-05-28 RX ORDER — ONDANSETRON 4 MG/1
4 TABLET, FILM COATED ORAL EVERY 4 HOURS PRN
Qty: 10 TABLET | Refills: 0 | Status: SHIPPED | OUTPATIENT
Start: 2025-05-28 | End: 2025-05-29 | Stop reason: SDUPTHER

## 2025-05-28 ASSESSMENT — ENCOUNTER SYMPTOMS
BACK PAIN: 0
ABDOMINAL PAIN: 0
FEVER: 0
FLANK PAIN: 1
VOMITING: 0
NAUSEA: 0

## 2025-05-28 NOTE — LETTER
May 28, 2025    To Whom It May Concern:         This is confirmation that CAMERON JJ attended her scheduled appointment with ADRIANO Mcdaniel on 5/28/25. Please excuse from any missed work.           If you have any questions please do not hesitate to call me at the phone number listed below.    Sincerely,          ASHLI Mcdaniel.  [phone]

## 2025-05-28 NOTE — PROGRESS NOTES
Chief Complaint   Patient presents with    Other     Pt. States kidney stone, dark urine, n/v, dull ache, since Saturday. Taking zofran + hycosamine for support.        HISTORY OF PRESENT ILLNESS: Patient is a pleasant 65 y.o. female who presents to urgent care today patient comes in today with extensive history of ongoing kidney stone.  She noted flank pain that started over the weekend and continues to get worse, she is concerned she has another stone.  She notes some nausea, no vomiting.  No pain with urination, no noted fevers.    Patient Active Problem List    Diagnosis Date Noted    Screening mammogram for breast cancer 2025    Need for vaccination 2025    Neuropathy 2024    Tinnitus of both ears 2024    Sciatica 2024    Other irritable bowel syndrome 2023    Gastroesophageal reflux disease 2023    Hyperlipidemia 2023    Hypothyroidism 2023    Elevated blood pressure reading in office with white coat syndrome, without diagnosis of hypertension 2023    Obstructive sleep apnea syndrome in adult 2019    BMI 32.0-32.9,adult 2018    Right ureteral stone 2018    Folliculitis 2017       Allergies:Bactrim, Bee, and Ampicillin    Current Medications and Prescriptions Ordered in Epic[1]    Past Medical History[2]    Social History[3]    Family Status   Relation Name Status    Mo  Alive    Fa          kidney failure    No partnership data on file   History reviewed. No pertinent family history.    Review of Systems   Constitutional:  Negative for fever.   Gastrointestinal:  Negative for abdominal pain, nausea and vomiting.   Genitourinary:  Positive for flank pain. Negative for dysuria, frequency and urgency.   Musculoskeletal:  Negative for back pain.       Exam:  /76 (BP Location: Right arm, Patient Position: Sitting, BP Cuff Size: Adult long)   Pulse 72   Temp 36.6 °C (97.9 °F) (Temporal)   Resp 12   Ht 1.676 m (5'  "6\")   Wt 91 kg (200 lb 11.7 oz)   SpO2 95%   Physical Exam  Vitals reviewed.   Constitutional:       General: She is not in acute distress.     Appearance: Normal appearance. She is obese.   HENT:      Head: Normocephalic.      Nose: Nose normal.      Mouth/Throat:      Mouth: Mucous membranes are moist.      Pharynx: Oropharynx is clear.   Eyes:      Extraocular Movements: Extraocular movements intact.      Pupils: Pupils are equal, round, and reactive to light.   Cardiovascular:      Rate and Rhythm: Normal rate and regular rhythm.      Pulses: Normal pulses.      Heart sounds: Normal heart sounds.   Pulmonary:      Effort: Pulmonary effort is normal. No respiratory distress.      Breath sounds: Normal breath sounds.   Abdominal:      General: Bowel sounds are normal. There is no distension.      Palpations: Abdomen is soft.      Tenderness: There is no abdominal tenderness. There is left CVA tenderness. There is no right CVA tenderness, guarding or rebound.   Musculoskeletal:         General: Normal range of motion.      Cervical back: Normal range of motion.   Skin:     General: Skin is warm and dry.   Neurological:      General: No focal deficit present.      Mental Status: She is alert.   Psychiatric:         Mood and Affect: Mood normal.             Assessment/Plan:  1. Flank pain  - POCT Urinalysis  - tamsulosin (FLOMAX) 0.4 MG capsule; Take 0.4 mg once daily until stone passage occurs or for up to 4 weeks.  Dispense: 30 Capsule; Refill: 0  - US-RENAL; Future  - ondansetron (ZOFRAN) 4 MG Tab tablet; Take 1 Tablet by mouth every four hours as needed for Nausea/Vomiting for up to 3 days.  Dispense: 10 Tablet; Refill: 0  - Comp Metabolic Panel; Future  - URINE CULTURE(NEW); Future    2. Kidney stone  - Referral to Urology    Based on physical exam along with review of systems I did order a urinalysis, this does show a moderate amount of blood.  Patient has an extensive history with ongoing imaging, I did go " ahead and order an ultrasound for this reason.,  Otherwise patient appears somewhat stable, she does have some left-sided CVA tenderness, stomach is soft and nontender, vitals are within normal limits.  CMP ordered to rule out any kidney concerns.  Patient advised that if her pain continues to get worse or she does not improved please seek further evaluation in the emergency room.       Noted 1.7cm stone on US non obstructing.  Referral placed to urology.  I left extensive message on her phone regarding results and treatment measures.  Message on [employer] as well.  She was provided with Flomax and Zofran for comfort measures. Total time spent with the patient 45 minutes to include, review of chart, charting, assessment, procedure.    Supportive care, differential diagnoses, and indications for immediate follow-up discussed with patient.   Pathogenesis of diagnosis discussed including typical length and natural progression.   Instructed to return to clinic or nearest emergency department for any change in condition, further concerns, or worsening of symptoms.  Patient states understanding of the plan of care and discharge instructions.  Instructed to make an appointment, for follow up, with primary care provider.    Please note that this dictation was created using voice recognition software. I have made every reasonable attempt to correct obvious errors, but I expect that there are errors of grammar and possibly content that I did not discover before finalizing the note.      Patricia FLANNERY          [1]   Current Outpatient Medications Ordered in Epic   Medication Sig Dispense Refill    tamsulosin (FLOMAX) 0.4 MG capsule Take 0.4 mg once daily until stone passage occurs or for up to 4 weeks. 30 Capsule 0    ondansetron (ZOFRAN) 4 MG Tab tablet Take 1 Tablet by mouth every four hours as needed for Nausea/Vomiting for up to 3 days. 10 Tablet 0    sertraline (ZOLOFT) 100 MG Tab TAKE 1 & 1/2 (ONE & ONE-HALF) TABLETS  BY MOUTH ONCE DAILY IN THE MORNING FOR   Tablet 0    levothyroxine (SYNTHROID) 137 MCG Tab Take 1 Tablet by mouth every morning on an empty stomach. 100 Tablet 3    pantoprazole (PROTONIX) 40 MG Tablet Delayed Response Take 1 Tablet by mouth 2 times a day. 200 Tablet 3    Probiotic Product (PROBIOTIC DAILY PO) Take  by mouth.      aspirin (ASA) 81 MG Chew Tab chewable tablet Chew 1 Tablet every day. Indications: Fever, blood thinner      VITAMIN B COMPLEX-C PO Take 1 Tab by mouth every morning.      Cholecalciferol (VITAMIN D) 400 UNIT Tab Take 1 Tablet by mouth every morning.      triamcinolone acetonide (KENALOG) 0.1 % Cream Apply 1 Application topically as needed (itching). Indications: Allergic Contact Dermatitis (Patient not taking: Reported on 5/28/2025) 80 g 1     No current [facility]-ordered facility-administered medications on file.   [2]   Past Medical History:  Diagnosis Date    Anesthesia     nausea and vomiting    Arrhythmia     occas ectopic beat    Dental disorder     upper flipper    GERD (gastroesophageal reflux disease)     Hashimoto's disease     Heart burn     Heart murmur     History of MRSA infection 2016    head and armpit     IBS (irritable bowel syndrome)     Indigestion     Kidney calculi     kidney stones    Other specified disorder of intestines     IBS    Sleep apnea     no cpap since losing 60 pounds     Snoring    [3]   Social History  Tobacco Use    Smoking status: Never    Smokeless tobacco: Never   Vaping Use    Vaping status: Never Used   Substance Use Topics    Alcohol use: Yes     Comment: 6 PER YEAR     Drug use: No

## 2025-05-29 ENCOUNTER — HOSPITAL ENCOUNTER (OUTPATIENT)
Dept: RADIOLOGY | Facility: MEDICAL CENTER | Age: 65
End: 2025-05-29
Attending: UROLOGY
Payer: MEDICARE

## 2025-05-29 ENCOUNTER — PHARMACY VISIT (OUTPATIENT)
Dept: PHARMACY | Facility: MEDICAL CENTER | Age: 65
End: 2025-05-29
Payer: COMMERCIAL

## 2025-05-29 ENCOUNTER — OFFICE VISIT (OUTPATIENT)
Dept: UROLOGY | Facility: MEDICAL CENTER | Age: 65
End: 2025-05-29
Payer: MEDICARE

## 2025-05-29 DIAGNOSIS — N23 RENAL COLIC ON LEFT SIDE: ICD-10-CM

## 2025-05-29 DIAGNOSIS — N20.0 NEPHROLITHIASIS: Primary | ICD-10-CM

## 2025-05-29 DIAGNOSIS — N20.0 NEPHROLITHIASIS: ICD-10-CM

## 2025-05-29 DIAGNOSIS — R10.9 FLANK PAIN: ICD-10-CM

## 2025-05-29 DIAGNOSIS — R11.0 NAUSEA: ICD-10-CM

## 2025-05-29 PROCEDURE — 74176 CT ABD & PELVIS W/O CONTRAST: CPT

## 2025-05-29 PROCEDURE — RXMED WILLOW AMBULATORY MEDICATION CHARGE: Performed by: UROLOGY

## 2025-05-29 RX ORDER — ONDANSETRON 4 MG/1
4 TABLET, FILM COATED ORAL EVERY 4 HOURS PRN
Qty: 10 TABLET | Refills: 0 | Status: SHIPPED | OUTPATIENT
Start: 2025-05-29 | End: 2025-06-01

## 2025-05-29 RX ORDER — KETOROLAC TROMETHAMINE 10 MG/1
10 TABLET, FILM COATED ORAL EVERY 6 HOURS PRN
Qty: 30 TABLET | Refills: 0 | Status: SHIPPED | OUTPATIENT
Start: 2025-05-29

## 2025-05-29 NOTE — PROGRESS NOTES
Chief Complaint: flank pain    HPI: Majo Milner is a 65 y.o. female with a history of kidney stones, referred for urological follow up after a visit to urgent care yesterday for left flank pain.     Her current pain began over the weekend and has been more significant than she's experienced in the past.     She's had no fevers or chills. Urinalysis yesterday was negative; culture is pending. She has no history of urinary tract infections.     She has had greater than 30 kidney stones in her life, with three procedures required and passing multiple stones on her own. Most stone have been on the left side.       Past Medical History:  Past Medical History[1]    Past Surgical History:  Past Surgical History[2]    Family History:  No family history on file.    Social History:  Social History     Socioeconomic History    Marital status:      Spouse name: Not on file    Number of children: Not on file    Years of education: Not on file    Highest education level: Some college, no degree   Occupational History    Not on file   Tobacco Use    Smoking status: Never    Smokeless tobacco: Never   Vaping Use    Vaping status: Never Used   Substance and Sexual Activity    Alcohol use: Yes     Comment: 6 PER YEAR     Drug use: No    Sexual activity: Not on file   Other Topics Concern    Not on file   Social History Narrative    Not on file     Social Drivers of Health     Financial Resource Strain: Low Risk  (3/13/2025)    Overall Financial Resource Strain (CARDIA)     Difficulty of Paying Living Expenses: Not hard at all   Food Insecurity: No Food Insecurity (3/13/2025)    Hunger Vital Sign     Worried About Running Out of Food in the Last Year: Never true     Ran Out of Food in the Last Year: Never true   Transportation Needs: No Transportation Needs (3/13/2025)    PRAPARE - Transportation     Lack of Transportation (Medical): No     Lack of Transportation (Non-Medical): No   Physical Activity: Sufficiently Active  (3/13/2025)    Exercise Vital Sign     Days of Exercise per Week: 5 days     Minutes of Exercise per Session: 150+ min   Stress: Stress Concern Present (11/18/2022)    [place] [employer] - Occupational Stress Questionnaire     Feeling of Stress : To some extent   Social Connections: Unknown (11/18/2022)    Social Connection and Isolation Panel [NHANES]     Frequency of Communication with Friends and Family: Once a week     Frequency of Social Gatherings with Friends and Family: Once a week     Attends Hindu Services: Never     Active Member of Clubs or Organizations: Not on file     Attends Club or Organization Meetings: Not on file     Marital Status:    Intimate Partner Violence: Not on file   Housing Stability: Unknown (3/13/2025)    Housing Stability Vital Sign     Unable to Pay for Housing in the Last Year: No     Number of Times Moved in the Last Year: Not on file     Homeless in the Last Year: No       Medications:  Current Medications[3]    Allergies:  Allergies[4]    Review of Systems:  Constitutional: Negative for fever, chills and malaise/fatigue.   HENT: Negative for congestion.    Eyes: Negative for pain.   Respiratory: Negative for cough and shortness of breath.    Cardiovascular: Negative for leg swelling.   Gastrointestinal: Negative for nausea, vomiting, abdominal pain and diarrhea.   Genitourinary: Negative for dysuria and hematuria.   Skin: Negative for rash.   Neurological: Negative for dizziness, focal weakness and headaches.   Endo/Heme/Allergies: Does not bruise/bleed easily.   Psychiatric/Behavioral: Negative for depression.  The patient is not nervous/anxious.        Physical Exam:  There were no vitals filed for this visit.    GENERAL: well appearing, well nourished, NAD  RESP: respiratory effort normal  ABDOMEN: soft, nontender, nondistended, no masses or organomegaly. Left CVA tenderness  SKIN/LYMPH: normal coloration and turgor, no suspicious skin lesions  noted  NEURO/PSYCH: alert, oriented, normal speech, no focal findings or movement disorder noted  EXTREMITIES: no pedal edema noted      Data Review:    Labs:  POCT UA   Lab Results   Component Value Date/Time    POCCOLOR [place] 05/28/2025 10:30 AM    POCAPPEAR Turbid 05/28/2025 10:30 AM    POCLEUKEST Negative 05/28/2025 10:30 AM    POCNITRITE Negative 05/28/2025 10:30 AM    POCUROBILIGE 0.2 05/28/2025 10:30 AM    POCPROTEIN Negative 05/28/2025 10:30 AM    POCURPH 6.0 05/28/2025 10:30 AM    POCBLOOD Large 05/28/2025 10:30 AM    POCSPGRV 1.020 05/28/2025 10:30 AM    POCKETONES Negative 05/28/2025 10:30 AM    POCBILIRUBIN Negative 05/28/2025 10:30 AM    POCGLUCUA Negative 05/28/2025 10:30 AM      CBC   Lab Results   Component Value Date/Time    WBC 6.9 06/11/2021 1051    RBC 4.49 06/11/2021 1051    HEMOGLOBIN 13.6 06/11/2021 1051    HEMATOCRIT 42.8 06/11/2021 1051    MCV 95.3 06/11/2021 1051    MCH 30.3 06/11/2021 1051    MCHC 31.8 (L) 06/11/2021 1051    RDW 49.0 06/11/2021 1051    MPV 12.8 06/11/2021 1051    LYMPHOCYTES 15.80 (L) 06/11/2021 1051    LYMPHS 1.09 06/11/2021 1051    MONOCYTES 7.30 06/11/2021 1051    MONOS 0.50 06/11/2021 1051    EOSINOPHILS 2.50 06/11/2021 1051    EOS 0.17 06/11/2021 1051    BASOPHILS 0.70 06/11/2021 1051    BASO 0.05 06/11/2021 1051    NRBC 0.00 06/11/2021 1051       CMP   Lab Results   Component Value Date/Time    SODIUM 141 05/28/2025 1332    POTASSIUM 4.4 05/28/2025 1332    CHLORIDE 105 05/28/2025 1332    CO2 27 05/28/2025 1332    ANION 9.0 05/28/2025 1332    GLUCOSE 95 05/28/2025 1332    BUN 9 05/28/2025 1332    CREATININE 0.99 05/28/2025 1332    GFRCKD 63 05/28/2025 1332    CALCIUM 9.1 05/28/2025 1332    CORRCALC 9.4 05/28/2025 1332    ASTSGOT 24 05/28/2025 1332    ALTSGPT 23 05/28/2025 1332    ALKPHOSPHAT 34 05/28/2025 1332    TBILIRUBIN 0.4 05/28/2025 1332    ALBUMIN 3.6 05/28/2025 1332    TOTPROTEIN 6.1 05/28/2025 1332    GLOBULIN 2.5 05/28/2025 1332    AGRATIO 1.4 05/28/2025  1332       Imaging:   US-RENAL  Order: [id number]   Status: Final result       Next appt: Today at 03:00 PM in Urology (Mayco Conley M.D.)       Dx: Flank pain    Test Result Released: Yes (seen)    0 Result Notes  Details    Reading Physician Reading Date Result Priority   Wilmer Coleman M.D.  [phone] 5/28/2025 STAT     Narrative & Impression     5/28/2025 12:57 PM     HISTORY/REASON FOR EXAM:  Pain        TECHNIQUE/EXAM DESCRIPTION:  Renal ultrasound.     COMPARISON:  Renal ultrasound 11/15/2021     FINDINGS:     The right kidney measures 11.24 cm.  The right kidney appears normal in contour and parenchymal echotexture. The corticomedullary differentiation is preserved. The right renal collecting system is not dilated. No hydronephrosis. There are no renal   calculi.     The left kidney measures 12.77 cm. The left kidney appears normal in contour and parenchymal echotexture. The corticomedullary differentiation is preserved. The left renal collecting system is not dilated. No hydronephrosis. There is a 1.9 x 1.8 cm   proximal right renal stone.     The bladder demonstrates no focal wall abnormality.           IMPRESSION:     1.9 cm nonobstructing left proximal ureteral calculus. Recommend follow-up renal colic CT for further evaluation.           Assessment: 65 y.o. female with a history of recurrent nephrolithiasis (>30 stones in her life) presenting with acute left flank pain and nausea, with a negative urinalysis aside from RBC's and no symptoms of infection. Renal ultrasound yesterday demonstrates what is likely a large proximal left ureteral stone, though there was no hydronephrosis.     She's passed many stones previously, and may be able to pass this current stone if the size is reasonable. I explained that ultrasound is a poor modality to just stone size, and before deciding on a trial of medical expulsive therapy vs proceeding to the operating room, I'd recommend a stat CT of the  abdomen/pelvis. We also discussed the importance of going to the ED immediately if she develops fevers or flu-like symptoms while she has pain, as an obstructing stone in the setting of a urinary tract infection can be a true emergency requiring immediate decompression of the kidney.       Plan:    -CTAP renal colic protocol  -Continue tamsulosin  -PO ketorolac as needed for pain  -PRN zofran for nausea  -Will call with results and next steps, but will most likely require left ureteroscopy and laser lithotripsy with stent insertion. If CT demonstrates a stone as large as suggested on ultrasound, may be best served by using a steerable vacuum aspiration device to clear large stone burden, or even consider PCNL      Mayco Conley MD         [1]   Past Medical History:  Diagnosis Date    Anesthesia     nausea and vomiting    Arrhythmia     occas ectopic beat    Dental disorder     upper flipper    GERD (gastroesophageal reflux disease)     Hashimoto's disease     Heart burn     Heart murmur     History of MRSA infection 2016    head and armpit     IBS (irritable bowel syndrome)     Indigestion     Kidney calculi     kidney stones    Other specified disorder of intestines     IBS    Sleep apnea     no cpap since losing 60 pounds     Snoring    [2]   Past Surgical History:  Procedure Laterality Date    CYSTOSCOPY STENT PLACEMENT  5/23/2018    Procedure: CYSTOSCOPY;  Surgeon: Yifan Perdomo M.D.;  Location: SURGERY [facility];  Service: Urology    URETEROSCOPY Right 5/23/2018    Procedure: URETEROSCOPY;  Surgeon: Yifan Perdomo M.D.;  Location: [facility];  Service: Urology    LASERTRIPSY Right 5/23/2018    Procedure: LASERTRIPSY- LITHO;  Surgeon: Yifan Perdomo M.D.;  Location: [facility];  Service: Urology    JIMMIE BY LAPAROSCOPY  7/17/2013    Performed by John Carrasco M.D. at [facility]    CYSTOSCOPY STENT PLACEMENT  9/29/2011    Performed by YIFAN PERDOMO at [facility]  [facility]    URETEROSCOPY  9/29/2011    Performed by YIFAN PERDOMO at SURGERY [facility] ORS    LASERTRIPSY  9/29/2011    Performed by YIFAN PERDOMO at SURGERY [facility]    LITHOTRIPSY     [3]   Current Outpatient Medications   Medication Sig Dispense Refill    tamsulosin (FLOMAX) 0.4 MG capsule Take 0.4 mg once daily until stone passage occurs or for up to 4 weeks. 30 Capsule 0    ondansetron (ZOFRAN) 4 MG Tab tablet Take 1 Tablet by mouth every four hours as needed for Nausea/Vomiting for up to 3 days. 10 Tablet 0    sertraline (ZOLOFT) 100 MG Tab TAKE 1 & 1/2 (ONE & ONE-HALF) TABLETS BY MOUTH ONCE DAILY IN THE MORNING FOR   Tablet 0    levothyroxine (SYNTHROID) 137 MCG Tab Take 1 Tablet by mouth every morning on an empty stomach. 100 Tablet 3    pantoprazole (PROTONIX) 40 MG Tablet Delayed Response Take 1 Tablet by mouth 2 times a day. 200 Tablet 3    triamcinolone acetonide (KENALOG) 0.1 % Cream Apply 1 Application topically as needed (itching). Indications: Allergic Contact Dermatitis (Patient not taking: Reported on 5/28/2025) 80 g 1    Probiotic Product (PROBIOTIC DAILY PO) Take  by mouth.      aspirin (ASA) 81 MG Chew Tab chewable tablet Chew 1 Tablet every day. Indications: Fever, blood thinner      VITAMIN B COMPLEX-C PO Take 1 Tab by mouth every morning.      Cholecalciferol (VITAMIN D) 400 UNIT Tab Take 1 Tablet by mouth every morning.       No current facility-administered medications for this visit.   [4]   Allergies  Allergen Reactions    Bactrim      \"blisters inside of mouth and throat\"    Bee Swelling    Ampicillin Rash     "

## 2025-05-30 ENCOUNTER — PATIENT MESSAGE (OUTPATIENT)
Dept: UROLOGY | Facility: MEDICAL CENTER | Age: 65
End: 2025-05-30
Payer: MEDICARE

## 2025-05-30 NOTE — PROGRESS NOTES
I called Ms. Milner today to discuss her CT findings, which showed a 4-5 mm stone at the left UVJ and an 8 mm stone at the left UPJ. Her pain is finally under control; she did not notice passing a stone, but may well have passed the UVJ stone given the improved pain. Unclear if the UPJ stone is currently obstructive.     I will have her scheduled for left ureteroscopy and laser lithotripsy as soon as possible, but we also discussed that she should come to the ED at any time if she has recurrence of uncontrolled pain, nausea and vomiting with difficulty hydrating, or any fevers/chills/urinary tract symptoms. She understands and agrees.

## 2025-05-31 LAB
BACTERIA UR CULT: NORMAL
SIGNIFICANT IND 70042: NORMAL
SITE SITE: NORMAL
SOURCE SOURCE: NORMAL

## 2025-06-01 ENCOUNTER — RESULTS FOLLOW-UP (OUTPATIENT)
Dept: URGENT CARE | Facility: PHYSICIAN GROUP | Age: 65
End: 2025-06-01

## 2025-06-04 ENCOUNTER — APPOINTMENT (OUTPATIENT)
Dept: RADIOLOGY | Facility: MEDICAL CENTER | Age: 65
DRG: 661 | End: 2025-06-04
Attending: STUDENT IN AN ORGANIZED HEALTH CARE EDUCATION/TRAINING PROGRAM
Payer: MEDICARE

## 2025-06-04 ENCOUNTER — HOSPITAL ENCOUNTER (INPATIENT)
Facility: MEDICAL CENTER | Age: 65
LOS: 1 days | DRG: 661 | End: 2025-06-06
Attending: STUDENT IN AN ORGANIZED HEALTH CARE EDUCATION/TRAINING PROGRAM | Admitting: STUDENT IN AN ORGANIZED HEALTH CARE EDUCATION/TRAINING PROGRAM
Payer: MEDICARE

## 2025-06-04 DIAGNOSIS — N23 RENAL COLIC ON LEFT SIDE: ICD-10-CM

## 2025-06-04 DIAGNOSIS — N20.0 NEPHROLITHIASIS: ICD-10-CM

## 2025-06-04 DIAGNOSIS — N13.30 HYDRONEPHROSIS OF LEFT KIDNEY: ICD-10-CM

## 2025-06-04 DIAGNOSIS — N20.1 LEFT URETERAL STONE: Primary | ICD-10-CM

## 2025-06-04 LAB
ALBUMIN SERPL BCP-MCNC: 3.8 G/DL (ref 3.2–4.9)
ALBUMIN/GLOB SERPL: 1.4 G/DL
ALP SERPL-CCNC: 51 U/L (ref 30–99)
ALT SERPL-CCNC: 36 U/L (ref 2–50)
ANION GAP SERPL CALC-SCNC: 14 MMOL/L (ref 7–16)
AST SERPL-CCNC: 26 U/L (ref 12–45)
BASOPHILS # BLD AUTO: 1.1 % (ref 0–1.8)
BASOPHILS # BLD: 0.08 K/UL (ref 0–0.12)
BILIRUB SERPL-MCNC: 0.5 MG/DL (ref 0.1–1.5)
BUN SERPL-MCNC: 18 MG/DL (ref 8–22)
CALCIUM ALBUM COR SERPL-MCNC: 9.4 MG/DL (ref 8.5–10.5)
CALCIUM SERPL-MCNC: 9.2 MG/DL (ref 8.5–10.5)
CHLORIDE SERPL-SCNC: 102 MMOL/L (ref 96–112)
CO2 SERPL-SCNC: 24 MMOL/L (ref 20–33)
CREAT SERPL-MCNC: 1.38 MG/DL (ref 0.5–1.4)
EOSINOPHIL # BLD AUTO: 0.17 K/UL (ref 0–0.51)
EOSINOPHIL NFR BLD: 2.3 % (ref 0–6.9)
ERYTHROCYTE [DISTWIDTH] IN BLOOD BY AUTOMATED COUNT: 44.5 FL (ref 35.9–50)
GFR SERPLBLD CREATININE-BSD FMLA CKD-EPI: 42 ML/MIN/1.73 M 2
GLOBULIN SER CALC-MCNC: 2.8 G/DL (ref 1.9–3.5)
GLUCOSE SERPL-MCNC: 81 MG/DL (ref 65–99)
HCT VFR BLD AUTO: 40 % (ref 37–47)
HGB BLD-MCNC: 13.3 G/DL (ref 12–16)
IMM GRANULOCYTES # BLD AUTO: 0.02 K/UL (ref 0–0.11)
IMM GRANULOCYTES NFR BLD AUTO: 0.3 % (ref 0–0.9)
LIPASE SERPL-CCNC: 24 U/L (ref 11–82)
LYMPHOCYTES # BLD AUTO: 1.14 K/UL (ref 1–4.8)
LYMPHOCYTES NFR BLD: 15.6 % (ref 22–41)
MCH RBC QN AUTO: 29.9 PG (ref 27–33)
MCHC RBC AUTO-ENTMCNC: 33.3 G/DL (ref 32.2–35.5)
MCV RBC AUTO: 89.9 FL (ref 81.4–97.8)
MONOCYTES # BLD AUTO: 0.61 K/UL (ref 0–0.85)
MONOCYTES NFR BLD AUTO: 8.3 % (ref 0–13.4)
NEUTROPHILS # BLD AUTO: 5.29 K/UL (ref 1.82–7.42)
NEUTROPHILS NFR BLD: 72.4 % (ref 44–72)
NRBC # BLD AUTO: 0 K/UL
NRBC BLD-RTO: 0 /100 WBC (ref 0–0.2)
PLATELET # BLD AUTO: 227 K/UL (ref 164–446)
PMV BLD AUTO: 11.5 FL (ref 9–12.9)
POTASSIUM SERPL-SCNC: 3.7 MMOL/L (ref 3.6–5.5)
PROT SERPL-MCNC: 6.6 G/DL (ref 6–8.2)
RBC # BLD AUTO: 4.45 M/UL (ref 4.2–5.4)
SODIUM SERPL-SCNC: 140 MMOL/L (ref 135–145)
WBC # BLD AUTO: 7.3 K/UL (ref 4.8–10.8)

## 2025-06-04 PROCEDURE — 700105 HCHG RX REV CODE 258: Performed by: STUDENT IN AN ORGANIZED HEALTH CARE EDUCATION/TRAINING PROGRAM

## 2025-06-04 PROCEDURE — 96375 TX/PRO/DX INJ NEW DRUG ADDON: CPT

## 2025-06-04 PROCEDURE — 36415 COLL VENOUS BLD VENIPUNCTURE: CPT

## 2025-06-04 PROCEDURE — 96374 THER/PROPH/DIAG INJ IV PUSH: CPT

## 2025-06-04 PROCEDURE — 85025 COMPLETE CBC W/AUTO DIFF WBC: CPT

## 2025-06-04 PROCEDURE — 74176 CT ABD & PELVIS W/O CONTRAST: CPT

## 2025-06-04 PROCEDURE — 700111 HCHG RX REV CODE 636 W/ 250 OVERRIDE (IP): Mod: JZ | Performed by: STUDENT IN AN ORGANIZED HEALTH CARE EDUCATION/TRAINING PROGRAM

## 2025-06-04 PROCEDURE — 99285 EMERGENCY DEPT VISIT HI MDM: CPT

## 2025-06-04 PROCEDURE — 80053 COMPREHEN METABOLIC PANEL: CPT

## 2025-06-04 PROCEDURE — 99222 1ST HOSP IP/OBS MODERATE 55: CPT | Performed by: UROLOGY

## 2025-06-04 PROCEDURE — 83690 ASSAY OF LIPASE: CPT

## 2025-06-04 RX ORDER — HYDROMORPHONE HYDROCHLORIDE 1 MG/ML
1 INJECTION, SOLUTION INTRAMUSCULAR; INTRAVENOUS; SUBCUTANEOUS ONCE
Status: COMPLETED | OUTPATIENT
Start: 2025-06-04 | End: 2025-06-04

## 2025-06-04 RX ORDER — ONDANSETRON 2 MG/ML
4 INJECTION INTRAMUSCULAR; INTRAVENOUS ONCE
Status: COMPLETED | OUTPATIENT
Start: 2025-06-04 | End: 2025-06-04

## 2025-06-04 RX ORDER — KETOROLAC TROMETHAMINE 10 MG/1
10 TABLET, FILM COATED ORAL EVERY 6 HOURS PRN
Qty: 30 TABLET | Refills: 0 | Status: CANCELLED | OUTPATIENT
Start: 2025-06-04

## 2025-06-04 RX ORDER — ONDANSETRON 4 MG/1
1 TABLET, FILM COATED ORAL 3 TIMES DAILY PRN
COMMUNITY

## 2025-06-04 RX ORDER — SODIUM CHLORIDE 9 MG/ML
1000 INJECTION, SOLUTION INTRAVENOUS ONCE
Status: COMPLETED | OUTPATIENT
Start: 2025-06-04 | End: 2025-06-04

## 2025-06-04 RX ORDER — KETOROLAC TROMETHAMINE 15 MG/ML
15 INJECTION, SOLUTION INTRAMUSCULAR; INTRAVENOUS ONCE
Status: COMPLETED | OUTPATIENT
Start: 2025-06-04 | End: 2025-06-04

## 2025-06-04 RX ADMIN — ONDANSETRON 4 MG: 2 INJECTION INTRAMUSCULAR; INTRAVENOUS at 19:52

## 2025-06-04 RX ADMIN — SODIUM CHLORIDE 1000 ML: 9 INJECTION, SOLUTION INTRAVENOUS at 19:51

## 2025-06-04 RX ADMIN — KETOROLAC TROMETHAMINE 15 MG: 15 INJECTION, SOLUTION INTRAMUSCULAR; INTRAVENOUS at 19:47

## 2025-06-04 RX ADMIN — HYDROMORPHONE HYDROCHLORIDE 1 MG: 1 INJECTION, SOLUTION INTRAMUSCULAR; INTRAVENOUS; SUBCUTANEOUS at 19:49

## 2025-06-04 ASSESSMENT — PAIN DESCRIPTION - PAIN TYPE
TYPE: ACUTE PAIN
TYPE: ACUTE PAIN

## 2025-06-04 ASSESSMENT — ENCOUNTER SYMPTOMS: FLANK PAIN: 1

## 2025-06-04 NOTE — TELEPHONE ENCOUNTER
Received request via: Patient    Was the patient seen in the last year in this department? Yes    Does the patient have an active prescription (recently filled or refills available) for medication(s) requested? No    Pharmacy Name:   [facility] [address] [place], [place] [address] [facility]       Does the patient have skilled nursing Plus and need 100-day supply? (This applies to ALL medications) Yes, quantity updated to 100 days

## 2025-06-04 NOTE — Clinical Note
[facility]  [address] [place] [address]    [id number]    Majo Milner  [address] [address]    June 4, 2025    Member Name: Majo Milner   Member Number: [id number]   Reference Number: [id number]   Approved Services: Outpatient Surgery   Approved Service Dates: 06/11/2025 - 06/11/2026   Requesting Provider: Mayco Conley   Requested Provider: [facility]     Dear Majo Milner:    The following medical service(s) requested by Mayco Conley have been approved:    Procedure Code Procedure Code Name Requested Quantity Approved Quantity Status   64372 (CPT®) DE CYSTO/URETERO W/LITHOTRIPSY 1 1 Authorized       Approved Quantity means the number of visits approved for medication treatments and/or medical services.    The services should be provided by [facility] no later than 06/11/2026. Please contact the provider listed below with any questions.     Provider Information:  [facility]  [phone]    Your plan benefit may require a deductible, co-payment or coinsurance for these services. This authorization does not guarantee [facility] will pay the claim for services that you receive. Payment by [facility] for these services is subject to the terms of your Evidence of Coverage, your eligibility at the time of service, and confirmation of benefit coverage.    For any questions or additional information, please contact Customer Service:    [employer] Plus Toll Free: [phone]  TTY users dial: 711   Call Center Hours:  Oct 1 - Mar 31, Mon - Fri 7 AM to 8 PM PST  Oct 1 - Mar 31, Sat - Sun 8 AM to 8 PM PST  Apr 1 - Sep 30, Mon - Fri 7 AM to 8 PM PST   Office Hours: Mon - Fri 8 AM to 5 PM PST   E-mail: [email or web address]   Website:  [email or web address]      This information is available for free in other languages. Please contact Customer  Service at the phone number above for more information. [facility] complies with applicable Federal civil rights laws and does not discriminate on the basis of race, color, national origin, age, disability or sex.    Sincerely,     Healthcare Utilization Management Department     Cc: [facility]   Mayco Conley    Multi-Language Insert  Multi- Services  English: We have free  services to answer any questions you may have about our health or drug plan.  To get an , just call us at [phone].  Someone who speaks English/Language can help you.  This is a free service.  Guinean: Tenemos servicios de intérprete sin costo alguno  para responder cualquier pregunta que pueda tener sobre nuestro plan de parth o medicamentos. Para hablar con un intérprete, por favor llame al [phone]. Alguien que hable español le podrá ayudar. Renetta es un servicio gratuito.  Chinese Mandarin: ?????????????????????????????? ???????????????? [phone]????????????????? ?????????  Chinese Cantonese: ?????????????????????????????? ????????????? [phone]???????????????????? ????????  Tagalog:  Mayroon kaming libreng serbisyo sa correasarose lamba michelle gillngkeo goinsgavni o panggamot.  jamarcus Franklin  [phone]. Maaaesteban Engel.  Jeremias olsen.  Persian:  Nous proposons cara services gratuits d'interprétation pour répondre à toutes bela questions relatives à notre régime de santé ou d'assurance-médicaments. Pour accéder au service d'interprétation, il vous suffit de nous appeler au [phone]. Un interlocuteur parmango Françarnold pourra vous aider. Ce service est gratuit.  Faroese:  Jan hernandez có d?ch v? thông d?ch mi?n phí ð? tr? l?i các câu h?i v? chýõng s?c kh?e và chýõng trìn thu?c men. N?u  quí v? c?n thông d?ch viên rashaad g?i [phone] s? có nhân viên nói ti?ng Vi?t giúp ð? quí v?. Ðây là d?ch v? mi?n phí .  Turkmen:  Unser kostenloser Dolmetscherservice beantwortet Ihren Fragen zu unserem Gesundheits- und Arzneimittelplan. Unsere Dolmetscher erreichen Sie [phone]. Man wird Ihnen emperatriz auf [facility]. Dieser Service ist [facility].  Uzbek:  ??? ?? ?? ?? ?? ??? ?? ??? ?? ???? ?? ?? ???? ???? ????. ?? ???? ????? ?? [phone] ??? ??? ????.  ???? ?? ???? ?? ?? ????. ? ???? ??? ?????.   Malian: Åñëè ó âàñ âîçíèêíóò âîïðîñû îòíîñèòåëüíî ñòðàõîâîãî èëè ìåäèêàìåíòíîãî ïëàíà, âû ìîæåòå âîñïîëüçîâàòüñÿ íàøèìè áåñïëàòíûìè óñëóãàìè ïåðåâîä÷èêîâ. ×òîáû âîñïîëüçîâàòüñÿ óñëóãàìè ïåðåâîä÷èêà, ïîçâîíèòå íàì ïî òåëåôîíó [phone]. Âàì îêàæåò ïîìîùü ñîòðóäíèê, êîòîðûé ãîâîðèò ïî-póññêè. Äàííàÿ óñëóãà áåñïëàòíàÿ.  Yakut: ÅääÇ äÞÏã ÎÏãÇÊ ÇáãÊÑÌã ÇáÝæÑí ÇáãÌÇäíÉ ááÅÌÇÈÉ Úä Ãí ÃÓÆáÉ ÊÊÚáÞ ÈÇáÕÍÉ Ãæ ÌÏæá ÇáÃÏæíÉ áÏíäÇ. ááÍÕæá Úáì ãÊÑÌã ÝæÑí¡ áíÓ Úáíß Óæì ÇáÇÊÕÇá ÈäÇ Úáì [phone] . ÓíÞæã ÔÎÕ ãÇ íÊÍÏË ÇáÚÑÈíÉ ÈãÓÇÚÏÊß. åÐå ÎÏãÉ ãÌÇäíÉ.  Reddy: ????? ????????? ?? ??? ?? ????? ?? ???? ??? ???? ???? ?? ?????? ?? ???? ???? ?? ??? ????? ??? ????? ???????? ?????? ?????? ???. ?? ???????? ??????? ???? ?? ???, ?? ???? [phone] ?? ??? ????. ??? ??????? ?? ?????? ????? ?? ???? ??? ?? ???? ??. ?? ?? ????? ???? ??.   [place] Timorese:  È disponibile un servizio di interpretariato gratuito per rispondere a eventuali domande sul nostro piano sanitario e farmaceutico. Per un interprete, contattare il donnie [phone]. Un nostro incaricato sussy parla Italianovi fornirà l'assistenza necessaria. È un servizio gratuito.  Portugués:  Dispomos de serviços de interpretação gratuitos para responder a qualquer questão que tenha acerca do nosso plano de saúde ou de medicação. Para obter um intérprete, contacte-nos através do número [phone]. Irá encontrar alguém que fale o idioma  [place] para o ajudar. Renetta  serviço é gratuito.  Yakut Creole:  Nou genyen sèvis entèprèt gratis milagro reponn tout kesyon ou ta genyen konsènan plan medikal oswa dwòg nou an.  Milagro jwenn yon entèprèt, jis rele nou nan [phone]. Yon moun ki pale Kreyòl kapab ashlee w.  Sa a se yon sèvis ki gratis.  Polish:  Umo¿liwiamy bezp³atne skorzystanie z us³ug t³umacza ustnego, który pomo¿e w uzyskaniu odpowiedzi na temat planu zdrowotnego lub dawjose cruz allan. Cornelia skorzystaæ z pomocy t³umacza znaj¹cego gerardo aguilar¿y zadzwoniæ pod numer [phone]. Ta us³uga jest bezp³atna.  Guyanese: ????? ??????? ????????????????????? ??????????????????????????????????[phone] ???????????????? ? ????????????????? ?????

## 2025-06-04 NOTE — LETTER
[place] [address]  [address] [address]  [phone]     June 6, 2025    Patient: Majo Milner   YOB: 1960   Date of Visit: 6/4/2025-6/6/2025       To Whom It May Concern:    Majo Milner was seen and treated in our department on 6/4/2025 - 6/6/2025. She has been unable to work due to her symptoms since at least 5/28/2025, and will likely be unable to return to her full work duties for at least a week after discharge from the hospital. Please make accommodations and excuse her as possible while the patient gets her [place] paperwork done.    Sincerely,         BRIAN Lindo, DO   PGY-1  [facility]

## 2025-06-05 ENCOUNTER — APPOINTMENT (OUTPATIENT)
Dept: RADIOLOGY | Facility: MEDICAL CENTER | Age: 65
DRG: 661 | End: 2025-06-05
Attending: UROLOGY
Payer: MEDICARE

## 2025-06-05 ENCOUNTER — ANESTHESIA (OUTPATIENT)
Dept: SURGERY | Facility: MEDICAL CENTER | Age: 65
DRG: 661 | End: 2025-06-05
Payer: MEDICARE

## 2025-06-05 ENCOUNTER — ANESTHESIA EVENT (OUTPATIENT)
Dept: SURGERY | Facility: MEDICAL CENTER | Age: 65
DRG: 661 | End: 2025-06-05
Payer: MEDICARE

## 2025-06-05 PROBLEM — N13.2 HYDRONEPHROSIS WITH URINARY OBSTRUCTION DUE TO URETERAL CALCULUS: Status: ACTIVE | Noted: 2025-06-05

## 2025-06-05 PROBLEM — F39 MOOD DISORDER (HCC): Status: ACTIVE | Noted: 2025-06-05

## 2025-06-05 LAB
ANION GAP SERPL CALC-SCNC: 19 MMOL/L (ref 7–16)
APPEARANCE UR: CLEAR
BACTERIA #/AREA URNS HPF: ABNORMAL /HPF
BILIRUB UR QL STRIP.AUTO: NEGATIVE
BUN SERPL-MCNC: 12 MG/DL (ref 8–22)
CA OXALATE CRYSTAL  1765: PRESENT /HPF
CALCIUM SERPL-MCNC: 8.1 MG/DL (ref 8.5–10.5)
CASTS URNS QL MICRO: ABNORMAL /LPF (ref 0–2)
CHLORIDE SERPL-SCNC: 109 MMOL/L (ref 96–112)
CO2 SERPL-SCNC: 12 MMOL/L (ref 20–33)
COLOR UR: ABNORMAL
CREAT SERPL-MCNC: 0.93 MG/DL (ref 0.5–1.4)
EPITHELIAL CELLS 1715: ABNORMAL /HPF (ref 0–5)
ERYTHROCYTE [DISTWIDTH] IN BLOOD BY AUTOMATED COUNT: 45.2 FL (ref 35.9–50)
GFR SERPLBLD CREATININE-BSD FMLA CKD-EPI: 68 ML/MIN/1.73 M 2
GLUCOSE SERPL-MCNC: 93 MG/DL (ref 65–99)
GLUCOSE UR STRIP.AUTO-MCNC: NEGATIVE MG/DL
HCT VFR BLD AUTO: 36.7 % (ref 37–47)
HGB BLD-MCNC: 11.8 G/DL (ref 12–16)
KETONES UR STRIP.AUTO-MCNC: 80 MG/DL
LEUKOCYTE ESTERASE UR QL STRIP.AUTO: ABNORMAL
MCH RBC QN AUTO: 29.6 PG (ref 27–33)
MCHC RBC AUTO-ENTMCNC: 32.2 G/DL (ref 32.2–35.5)
MCV RBC AUTO: 92.2 FL (ref 81.4–97.8)
MICRO URNS: ABNORMAL
NITRITE UR QL STRIP.AUTO: NEGATIVE
PH UR STRIP.AUTO: 5.5 [PH] (ref 5–8)
PLATELET # BLD AUTO: 186 K/UL (ref 164–446)
PMV BLD AUTO: 11.8 FL (ref 9–12.9)
POTASSIUM SERPL-SCNC: 4.6 MMOL/L (ref 3.6–5.5)
PROT UR QL STRIP: NEGATIVE MG/DL
RBC # BLD AUTO: 3.98 M/UL (ref 4.2–5.4)
RBC # URNS HPF: ABNORMAL /HPF (ref 0–2)
RBC UR QL AUTO: NEGATIVE
SODIUM SERPL-SCNC: 140 MMOL/L (ref 135–145)
SP GR UR STRIP.AUTO: 1.03
UROBILINOGEN UR STRIP.AUTO-MCNC: 1 EU/DL
WBC # BLD AUTO: 7.4 K/UL (ref 4.8–10.8)
WBC #/AREA URNS HPF: ABNORMAL /HPF

## 2025-06-05 PROCEDURE — 700102 HCHG RX REV CODE 250 W/ 637 OVERRIDE(OP)

## 2025-06-05 PROCEDURE — 74018 RADEX ABDOMEN 1 VIEW: CPT

## 2025-06-05 PROCEDURE — 160002 HCHG RECOVERY MINUTES (STAT): Performed by: UROLOGY

## 2025-06-05 PROCEDURE — 0TJB8ZZ INSPECTION OF BLADDER, VIA NATURAL OR ARTIFICIAL OPENING ENDOSCOPIC: ICD-10-PCS | Performed by: UROLOGY

## 2025-06-05 PROCEDURE — 770006 HCHG ROOM/CARE - MED/SURG/GYN SEMI*

## 2025-06-05 PROCEDURE — 85027 COMPLETE CBC AUTOMATED: CPT

## 2025-06-05 PROCEDURE — 700101 HCHG RX REV CODE 250: Performed by: ANESTHESIOLOGY

## 2025-06-05 PROCEDURE — 80048 BASIC METABOLIC PNL TOTAL CA: CPT

## 2025-06-05 PROCEDURE — A9270 NON-COVERED ITEM OR SERVICE: HCPCS

## 2025-06-05 PROCEDURE — 160048 HCHG OR STATISTICAL LEVEL 1-5: Performed by: UROLOGY

## 2025-06-05 PROCEDURE — 700111 HCHG RX REV CODE 636 W/ 250 OVERRIDE (IP): Mod: JZ

## 2025-06-05 PROCEDURE — 52332 CYSTOSCOPY AND TREATMENT: CPT | Mod: LT | Performed by: UROLOGY

## 2025-06-05 PROCEDURE — 96361 HYDRATE IV INFUSION ADD-ON: CPT

## 2025-06-05 PROCEDURE — 700105 HCHG RX REV CODE 258

## 2025-06-05 PROCEDURE — 160039 HCHG SURGERY MINUTES - EA ADDL 1 MIN LEVEL 3: Performed by: UROLOGY

## 2025-06-05 PROCEDURE — 700111 HCHG RX REV CODE 636 W/ 250 OVERRIDE (IP): Performed by: ANESTHESIOLOGY

## 2025-06-05 PROCEDURE — 160009 HCHG ANES TIME/MIN: Performed by: UROLOGY

## 2025-06-05 PROCEDURE — 0T778DZ DILATION OF LEFT URETER WITH INTRALUMINAL DEVICE, VIA NATURAL OR ARTIFICIAL OPENING ENDOSCOPIC: ICD-10-PCS | Performed by: UROLOGY

## 2025-06-05 PROCEDURE — 160028 HCHG SURGERY MINUTES - 1ST 30 MINS LEVEL 3: Performed by: UROLOGY

## 2025-06-05 PROCEDURE — 160035 HCHG PACU - 1ST 60 MINS PHASE I: Performed by: UROLOGY

## 2025-06-05 PROCEDURE — C1769 GUIDE WIRE: HCPCS | Performed by: UROLOGY

## 2025-06-05 PROCEDURE — C2617 STENT, NON-COR, TEM W/O DEL: HCPCS | Performed by: UROLOGY

## 2025-06-05 PROCEDURE — 160015 HCHG STAT PREOP MINUTES: Performed by: UROLOGY

## 2025-06-05 PROCEDURE — 87086 URINE CULTURE/COLONY COUNT: CPT

## 2025-06-05 PROCEDURE — 81001 URINALYSIS AUTO W/SCOPE: CPT

## 2025-06-05 PROCEDURE — 36415 COLL VENOUS BLD VENIPUNCTURE: CPT

## 2025-06-05 DEVICE — STENT UROLOGICAL POLARIS 6X26 ULTRA: Type: IMPLANTABLE DEVICE | Site: URETHRA | Status: FUNCTIONAL

## 2025-06-05 RX ORDER — HYDROMORPHONE HYDROCHLORIDE 1 MG/ML
0.2 INJECTION, SOLUTION INTRAMUSCULAR; INTRAVENOUS; SUBCUTANEOUS
Status: DISCONTINUED | OUTPATIENT
Start: 2025-06-05 | End: 2025-06-05 | Stop reason: HOSPADM

## 2025-06-05 RX ORDER — LABETALOL HYDROCHLORIDE 5 MG/ML
5 INJECTION, SOLUTION INTRAVENOUS
Status: DISCONTINUED | OUTPATIENT
Start: 2025-06-05 | End: 2025-06-05 | Stop reason: HOSPADM

## 2025-06-05 RX ORDER — ONDANSETRON 2 MG/ML
4 INJECTION INTRAMUSCULAR; INTRAVENOUS EVERY 4 HOURS PRN
Status: DISCONTINUED | OUTPATIENT
Start: 2025-06-05 | End: 2025-06-06 | Stop reason: HOSPADM

## 2025-06-05 RX ORDER — HYDROMORPHONE HYDROCHLORIDE 1 MG/ML
0.5 INJECTION, SOLUTION INTRAMUSCULAR; INTRAVENOUS; SUBCUTANEOUS EVERY 4 HOURS PRN
Status: DISCONTINUED | OUTPATIENT
Start: 2025-06-05 | End: 2025-06-05

## 2025-06-05 RX ORDER — HYDRALAZINE HYDROCHLORIDE 20 MG/ML
5 INJECTION INTRAMUSCULAR; INTRAVENOUS
Status: DISCONTINUED | OUTPATIENT
Start: 2025-06-05 | End: 2025-06-05 | Stop reason: HOSPADM

## 2025-06-05 RX ORDER — MIDAZOLAM HYDROCHLORIDE 1 MG/ML
INJECTION INTRAMUSCULAR; INTRAVENOUS PRN
Status: DISCONTINUED | OUTPATIENT
Start: 2025-06-05 | End: 2025-06-05 | Stop reason: SURG

## 2025-06-05 RX ORDER — ACETAMINOPHEN 500 MG
1000 TABLET ORAL EVERY 6 HOURS PRN
Status: DISCONTINUED | OUTPATIENT
Start: 2025-06-05 | End: 2025-06-06

## 2025-06-05 RX ORDER — OXYCODONE HCL 5 MG/5 ML
10 SOLUTION, ORAL ORAL
Status: DISCONTINUED | OUTPATIENT
Start: 2025-06-05 | End: 2025-06-05 | Stop reason: HOSPADM

## 2025-06-05 RX ORDER — HYDROMORPHONE HYDROCHLORIDE 1 MG/ML
1 INJECTION, SOLUTION INTRAMUSCULAR; INTRAVENOUS; SUBCUTANEOUS EVERY 4 HOURS PRN
Status: DISCONTINUED | OUTPATIENT
Start: 2025-06-05 | End: 2025-06-05

## 2025-06-05 RX ORDER — SODIUM CHLORIDE 9 MG/ML
INJECTION, SOLUTION INTRAVENOUS CONTINUOUS
Status: DISCONTINUED | OUTPATIENT
Start: 2025-06-05 | End: 2025-06-06

## 2025-06-05 RX ORDER — HALOPERIDOL 5 MG/ML
1 INJECTION INTRAMUSCULAR
Status: DISCONTINUED | OUTPATIENT
Start: 2025-06-05 | End: 2025-06-05 | Stop reason: HOSPADM

## 2025-06-05 RX ORDER — CIPROFLOXACIN 250 MG/1
250 TABLET, FILM COATED ORAL EVERY 12 HOURS
Status: DISCONTINUED | OUTPATIENT
Start: 2025-06-05 | End: 2025-06-06 | Stop reason: HOSPADM

## 2025-06-05 RX ORDER — EPHEDRINE SULFATE 50 MG/ML
INJECTION, SOLUTION INTRAVENOUS PRN
Status: DISCONTINUED | OUTPATIENT
Start: 2025-06-05 | End: 2025-06-05 | Stop reason: SURG

## 2025-06-05 RX ORDER — MEPERIDINE HYDROCHLORIDE 50 MG/ML
12.5 INJECTION INTRAMUSCULAR; INTRAVENOUS; SUBCUTANEOUS
Status: DISCONTINUED | OUTPATIENT
Start: 2025-06-05 | End: 2025-06-05 | Stop reason: HOSPADM

## 2025-06-05 RX ORDER — CEFAZOLIN SODIUM 1 G/3ML
INJECTION, POWDER, FOR SOLUTION INTRAMUSCULAR; INTRAVENOUS PRN
Status: DISCONTINUED | OUTPATIENT
Start: 2025-06-05 | End: 2025-06-05 | Stop reason: SURG

## 2025-06-05 RX ORDER — ENOXAPARIN SODIUM 100 MG/ML
40 INJECTION SUBCUTANEOUS DAILY
Status: DISCONTINUED | OUTPATIENT
Start: 2025-06-05 | End: 2025-06-06 | Stop reason: HOSPADM

## 2025-06-05 RX ORDER — LIDOCAINE HYDROCHLORIDE 20 MG/ML
INJECTION, SOLUTION EPIDURAL; INFILTRATION; INTRACAUDAL; PERINEURAL PRN
Status: DISCONTINUED | OUTPATIENT
Start: 2025-06-05 | End: 2025-06-05 | Stop reason: SURG

## 2025-06-05 RX ORDER — DIPHENHYDRAMINE HYDROCHLORIDE 50 MG/ML
12.5 INJECTION, SOLUTION INTRAMUSCULAR; INTRAVENOUS
Status: DISCONTINUED | OUTPATIENT
Start: 2025-06-05 | End: 2025-06-05 | Stop reason: HOSPADM

## 2025-06-05 RX ORDER — LEVOTHYROXINE SODIUM 137 UG/1
137 TABLET ORAL
Status: DISCONTINUED | OUTPATIENT
Start: 2025-06-05 | End: 2025-06-06 | Stop reason: HOSPADM

## 2025-06-05 RX ORDER — MORPHINE SULFATE 4 MG/ML
4 INJECTION INTRAVENOUS EVERY 4 HOURS PRN
Status: DISCONTINUED | OUTPATIENT
Start: 2025-06-05 | End: 2025-06-05

## 2025-06-05 RX ORDER — ONDANSETRON 2 MG/ML
INJECTION INTRAMUSCULAR; INTRAVENOUS PRN
Status: DISCONTINUED | OUTPATIENT
Start: 2025-06-05 | End: 2025-06-05 | Stop reason: SURG

## 2025-06-05 RX ORDER — SODIUM CHLORIDE, SODIUM LACTATE, POTASSIUM CHLORIDE, CALCIUM CHLORIDE 600; 310; 30; 20 MG/100ML; MG/100ML; MG/100ML; MG/100ML
INJECTION, SOLUTION INTRAVENOUS CONTINUOUS
Status: DISCONTINUED | OUTPATIENT
Start: 2025-06-05 | End: 2025-06-05 | Stop reason: HOSPADM

## 2025-06-05 RX ORDER — ONDANSETRON 4 MG/1
4 TABLET, ORALLY DISINTEGRATING ORAL EVERY 4 HOURS PRN
Status: DISCONTINUED | OUTPATIENT
Start: 2025-06-05 | End: 2025-06-06 | Stop reason: HOSPADM

## 2025-06-05 RX ORDER — MIDAZOLAM HYDROCHLORIDE 1 MG/ML
1 INJECTION INTRAMUSCULAR; INTRAVENOUS
Status: DISCONTINUED | OUTPATIENT
Start: 2025-06-05 | End: 2025-06-05 | Stop reason: HOSPADM

## 2025-06-05 RX ORDER — EPHEDRINE SULFATE 50 MG/ML
5 INJECTION, SOLUTION INTRAVENOUS
Status: DISCONTINUED | OUTPATIENT
Start: 2025-06-05 | End: 2025-06-05 | Stop reason: HOSPADM

## 2025-06-05 RX ORDER — HYDROMORPHONE HYDROCHLORIDE 1 MG/ML
0.25 INJECTION, SOLUTION INTRAMUSCULAR; INTRAVENOUS; SUBCUTANEOUS EVERY 4 HOURS PRN
Status: DISCONTINUED | OUTPATIENT
Start: 2025-06-05 | End: 2025-06-06

## 2025-06-05 RX ORDER — HYDROMORPHONE HYDROCHLORIDE 1 MG/ML
0.4 INJECTION, SOLUTION INTRAMUSCULAR; INTRAVENOUS; SUBCUTANEOUS
Status: DISCONTINUED | OUTPATIENT
Start: 2025-06-05 | End: 2025-06-05 | Stop reason: HOSPADM

## 2025-06-05 RX ORDER — HYDROMORPHONE HYDROCHLORIDE 1 MG/ML
0.1 INJECTION, SOLUTION INTRAMUSCULAR; INTRAVENOUS; SUBCUTANEOUS
Status: DISCONTINUED | OUTPATIENT
Start: 2025-06-05 | End: 2025-06-05 | Stop reason: HOSPADM

## 2025-06-05 RX ORDER — ONDANSETRON 2 MG/ML
4 INJECTION INTRAMUSCULAR; INTRAVENOUS
Status: DISCONTINUED | OUTPATIENT
Start: 2025-06-05 | End: 2025-06-05 | Stop reason: HOSPADM

## 2025-06-05 RX ORDER — OXYCODONE HCL 5 MG/5 ML
5 SOLUTION, ORAL ORAL
Status: DISCONTINUED | OUTPATIENT
Start: 2025-06-05 | End: 2025-06-05 | Stop reason: HOSPADM

## 2025-06-05 RX ORDER — ALBUTEROL SULFATE 5 MG/ML
2.5 SOLUTION RESPIRATORY (INHALATION)
Status: DISCONTINUED | OUTPATIENT
Start: 2025-06-05 | End: 2025-06-05 | Stop reason: HOSPADM

## 2025-06-05 RX ADMIN — SODIUM CHLORIDE: 9 INJECTION, SOLUTION INTRAVENOUS at 09:53

## 2025-06-05 RX ADMIN — SODIUM CHLORIDE: 9 INJECTION, SOLUTION INTRAVENOUS at 01:47

## 2025-06-05 RX ADMIN — MIDAZOLAM HYDROCHLORIDE 2 MG: 1 INJECTION, SOLUTION INTRAMUSCULAR; INTRAVENOUS at 11:52

## 2025-06-05 RX ADMIN — ONDANSETRON 4 MG: 2 INJECTION INTRAMUSCULAR; INTRAVENOUS at 09:09

## 2025-06-05 RX ADMIN — SERTRALINE 150 MG: 100 TABLET, FILM COATED ORAL at 05:26

## 2025-06-05 RX ADMIN — SODIUM CHLORIDE: 9 INJECTION, SOLUTION INTRAVENOUS at 14:47

## 2025-06-05 RX ADMIN — CEFAZOLIN 2 G: 1 INJECTION, POWDER, FOR SOLUTION INTRAMUSCULAR; INTRAVENOUS at 11:52

## 2025-06-05 RX ADMIN — ONDANSETRON 4 MG: 2 INJECTION INTRAMUSCULAR; INTRAVENOUS at 11:52

## 2025-06-05 RX ADMIN — LIDOCAINE HYDROCHLORIDE 40 MG: 20 INJECTION, SOLUTION EPIDURAL; INFILTRATION; INTRACAUDAL; PERINEURAL at 11:52

## 2025-06-05 RX ADMIN — ENOXAPARIN SODIUM 40 MG: 100 INJECTION SUBCUTANEOUS at 18:41

## 2025-06-05 RX ADMIN — EPHEDRINE SULFATE 25 MG: 50 INJECTION, SOLUTION INTRAVENOUS at 11:52

## 2025-06-05 RX ADMIN — HYDROMORPHONE HYDROCHLORIDE 1 MG: 1 INJECTION, SOLUTION INTRAMUSCULAR; INTRAVENOUS; SUBCUTANEOUS at 09:00

## 2025-06-05 RX ADMIN — FENTANYL CITRATE 100 MCG: 50 INJECTION, SOLUTION INTRAMUSCULAR; INTRAVENOUS at 11:52

## 2025-06-05 RX ADMIN — CIPROFLOXACIN HYDROCHLORIDE 250 MG: 250 TABLET, FILM COATED ORAL at 18:41

## 2025-06-05 RX ADMIN — LEVOTHYROXINE SODIUM 137 MCG: 0.14 TABLET ORAL at 05:25

## 2025-06-05 RX ADMIN — PROPOFOL 200 MG: 10 INJECTION, EMULSION INTRAVENOUS at 11:52

## 2025-06-05 RX ADMIN — ACETAMINOPHEN 1000 MG: 500 TABLET ORAL at 20:20

## 2025-06-05 ASSESSMENT — COGNITIVE AND FUNCTIONAL STATUS - GENERAL
MOBILITY SCORE: 24
SUGGESTED CMS G CODE MODIFIER DAILY ACTIVITY: CH
SUGGESTED CMS G CODE MODIFIER MOBILITY: CH
DAILY ACTIVITIY SCORE: 24

## 2025-06-05 ASSESSMENT — PATIENT HEALTH QUESTIONNAIRE - PHQ9
SUM OF ALL RESPONSES TO PHQ9 QUESTIONS 1 AND 2: 0
2. FEELING DOWN, DEPRESSED, IRRITABLE, OR HOPELESS: NOT AT ALL
1. LITTLE INTEREST OR PLEASURE IN DOING THINGS: NOT AT ALL

## 2025-06-05 ASSESSMENT — LIFESTYLE VARIABLES
TOTAL SCORE: 0
AVERAGE NUMBER OF DAYS PER WEEK YOU HAVE A DRINK CONTAINING ALCOHOL: 0
EVER FELT BAD OR GUILTY ABOUT YOUR DRINKING: NO
HAVE PEOPLE ANNOYED YOU BY CRITICIZING YOUR DRINKING: NO
HOW MANY TIMES IN THE PAST YEAR HAVE YOU HAD 5 OR MORE DRINKS IN A DAY: 0
ON A TYPICAL DAY WHEN YOU DRINK ALCOHOL HOW MANY DRINKS DO YOU HAVE: 0
CONSUMPTION TOTAL: NEGATIVE
ALCOHOL_USE: NO
TOTAL SCORE: 0
EVER HAD A DRINK FIRST THING IN THE MORNING TO STEADY YOUR NERVES TO GET RID OF A HANGOVER: NO
DOES PATIENT WANT TO STOP DRINKING: NO
TOTAL SCORE: 0
HAVE YOU EVER FELT YOU SHOULD CUT DOWN ON YOUR DRINKING: NO

## 2025-06-05 ASSESSMENT — ENCOUNTER SYMPTOMS: FLANK PAIN: 1

## 2025-06-05 ASSESSMENT — SOCIAL DETERMINANTS OF HEALTH (SDOH)
WITHIN THE LAST YEAR, HAVE TO BEEN RAPED OR FORCED TO HAVE ANY KIND OF SEXUAL ACTIVITY BY YOUR PARTNER OR EX-PARTNER?: NO
WITHIN THE LAST YEAR, HAVE YOU BEEN AFRAID OF YOUR PARTNER OR EX-PARTNER?: NO
WITHIN THE LAST YEAR, HAVE YOU BEEN AFRAID OF YOUR PARTNER OR EX-PARTNER?: NO
WITHIN THE LAST YEAR, HAVE YOU BEEN KICKED, HIT, SLAPPED, OR OTHERWISE PHYSICALLY HURT BY YOUR PARTNER OR EX-PARTNER?: NO
IN THE PAST 12 MONTHS, HAS THE ELECTRIC, GAS, OIL, OR WATER COMPANY THREATENED TO SHUT OFF SERVICE IN YOUR HOME?: NO
WITHIN THE PAST 12 MONTHS, YOU WORRIED THAT YOUR FOOD WOULD RUN OUT BEFORE YOU GOT THE MONEY TO BUY MORE: NEVER TRUE
WITHIN THE LAST YEAR, HAVE YOU BEEN HUMILIATED OR EMOTIONALLY ABUSED IN OTHER WAYS BY YOUR PARTNER OR EX-PARTNER?: NO
WITHIN THE LAST YEAR, HAVE YOU BEEN HUMILIATED OR EMOTIONALLY ABUSED IN OTHER WAYS BY YOUR PARTNER OR EX-PARTNER?: NO
WITHIN THE PAST 12 MONTHS, THE FOOD YOU BOUGHT JUST DIDN'T LAST AND YOU DIDN'T HAVE MONEY TO GET MORE: NEVER TRUE
WITHIN THE LAST YEAR, HAVE YOU BEEN KICKED, HIT, SLAPPED, OR OTHERWISE PHYSICALLY HURT BY YOUR PARTNER OR EX-PARTNER?: NO

## 2025-06-05 ASSESSMENT — PAIN DESCRIPTION - PAIN TYPE
TYPE: ACUTE PAIN
TYPE: SURGICAL PAIN
TYPE: ACUTE PAIN
TYPE: ACUTE PAIN

## 2025-06-05 ASSESSMENT — PAIN SCALES - GENERAL: PAIN_LEVEL: 2

## 2025-06-05 NOTE — ANESTHESIA POSTPROCEDURE EVALUATION
Patient: Majo Milner    Procedure Summary       Date: 06/05/25 Room / Location: [facility] 03 / SURGERY [facility]    Anesthesia Start: 1152 Anesthesia Stop: 1311    Procedure: CYSTOSCOPY, WITH LEFT URETERAL STENT INSERTION (Left: Urethra) Diagnosis: (left ureteral stone)    Surgeons: Krunal Tillman M.D. Responsible Provider: Brendan Fajardo M.D.    Anesthesia Type: general ASA Status: 2            Final Anesthesia Type: general  Last vitals  BP   Blood Pressure : (!) 152/69    Temp   36 °C (96.8 °F)    Pulse   99   Resp   13    SpO2   97 %      Anesthesia Post Evaluation    Patient location during evaluation: PACU  Patient participation: complete - patient participated  Level of consciousness: awake and alert  Pain score: 2    Airway patency: patent  Anesthetic complications: no  Cardiovascular status: hemodynamically stable  Respiratory status: acceptable  Hydration status: euvolemic    PONV: none          There were no known notable events for this encounter.     Nurse Pain Score: 0 (NPRS)

## 2025-06-05 NOTE — ED NOTES
Med Rec complete per patient   Allergies reviewed  Antibiotics in the past 30 days:no  Anticoagulant in past 14 days:no  Pharmacy patient utilizes:[facility] on [address]

## 2025-06-05 NOTE — ASSESSMENT & PLAN NOTE
Hx of ureteral stones followed by [facility]rology   Left ureteroscopy, laser lithotripsy and stent insertion scheduled for 6/11/2025  Reports increasing left flank pain since 5/24/2025 with associated chills, nausea and vomiting.  Placed on 3L of O2 in ED without recorded desat, unclear if for comfort given pain. No history of home oxygen  CT renal colic showed 8 mm stone in proximal left ureter with moderate left-sided hydronephrosis, which is stable from prior exam  Dr. Krunal Tillman with Urology consulted, recommended admission with pain management with stent placement on 6/5.  Dr. Tillman also recommends antibiotics after UA and culture with ciprofloxacin  Urine culture from 5/28/2025 NGTD.  Following stent placement plan is for patient to follow-up with Dr. Lou for definitive stone removal on June 11.  Following surgery this provider spoke with patient who requested to remain inpatient for pain management and observation as she continues to have an oxygen demand at this time.  - Admit to the floor  - N.p.o. at midnight pending stent placement in a.m.  - Continue  mL/h  -- Supplemental oxygen as need to maintain saturation  -- Continuous pulse ox  - Pain management, patient would like to avoid morphine and use Tylenol, Toradol, and hydromorphone  - CBC BMP in a.m.  - Urinalysis with urinary culture, per recommendation of urology will start ciprofloxacin  -Monitor urine culture  -Titrate oxygen as tolerated

## 2025-06-05 NOTE — ED TRIAGE NOTES
"Chief Complaint   Patient presents with    Flank Pain     Left side with confirmed kidney stones. Pain since 5/24     Pt ambulatory to triage with a steady gait and above complaint. Pt A+O x4. Pt educated on triage process and verbalized understanding.     BP (!) 151/83   Pulse 96   Temp 37.1 °C (98.7 °F) (Temporal)   Resp 18   Ht 1.676 m (5' 6\")   Wt 89.9 kg (198 lb 3.1 oz)   SpO2 93%     "

## 2025-06-05 NOTE — ED PROVIDER NOTES
ED Provider Note    CHIEF COMPLAINT  Chief Complaint   Patient presents with    Flank Pain     Left side with confirmed kidney stones. Pain since 5/24       EXTERNAL RECORDS REVIEWED  Outpatient note from Dr. Conley dated 30 May.  She had a CT which showed a 4 to 5 mm stone at the left UVJ and an 8 mm stone at the left UPJ.  Her pain was under control at that time.  Unclear if the UVJ stone is obstructive.  She was scheduled for an outpatient left ureteroscopy and laser lithotripsy.    HPI/ROS  LIMITATION TO HISTORY   none  OUTSIDE HISTORIAN(S):  none    Majo Milner is a 65 y.o. female who presents with persistent left flank pain.  Patient has had pain since 24 May.  She says it improved somewhat but is now not well-controlled at home on oral medications.  She has had nausea and vomiting.  No fevers but will occasionally feel warm.  She does have a left sided flank pain that radiates towards the abdomen.  She has some dysuria but no gross hematuria.  She states multiple prior obstructing stones. She has had multiple lithotripsies and stent placements in the past.  She has had greater than 30 kidney stones in her life and has also passed multiple stones on her own.    PAST MEDICAL HISTORY   has a past medical history of Anesthesia, Arrhythmia, Dental disorder, GERD (gastroesophageal reflux disease), Hashimoto's disease, Heart burn, Heart murmur, History of MRSA infection (2016), IBS (irritable bowel syndrome), Indigestion, Kidney calculi, Other specified disorder of intestines, Sleep apnea, and Snoring.    SURGICAL HISTORY   has a past surgical history that includes lithotripsy; cystoscopy stent placement (9/29/2011); ureteroscopy (9/29/2011); lasertripsy (9/29/2011); ness by laparoscopy (7/17/2013); cystoscopy stent placement (5/23/2018); ureteroscopy (Right, 5/23/2018); and lasertripsy (Right, 5/23/2018).    FAMILY HISTORY  No family history on file.    SOCIAL HISTORY  Social History     Tobacco Use     "Smoking status: Never    Smokeless tobacco: Never   Vaping Use    Vaping status: Never Used   Substance and Sexual Activity    Alcohol use: Yes     Comment: 6 PER YEAR     Drug use: No    Sexual activity: Not on file       CURRENT MEDICATIONS  Home Medications       Reviewed by Luis Pradhan (Pharmacy Tech) on 06/04/25 at 2044  Med List Status: Complete     Medication Last Dose Status   aspirin (ASA) 81 MG Chew Tab chewable tablet 5/28/2025 Active   Cholecalciferol (VITAMIN D) 400 UNIT Tab 5/28/2025 Active   ketorolac (TORADOL) 10 MG Tab 6/4/2025 Active   levothyroxine (SYNTHROID) 137 MCG Tab 6/4/2025 Active   pantoprazole (PROTONIX) 40 MG Tablet Delayed Response 6/2/2025 Active   Probiotic Product (PROBIOTIC DAILY PO) 5/28/2025 Active   sertraline (ZOLOFT) 100 MG Tab 6/2/2025 Active   tamsulosin (FLOMAX) 0.4 MG capsule 6/4/2025 Active   VITAMIN B COMPLEX-C PO 5/28/2025 Active                  Audit from Redirected Encounters    **Home medications have not yet been reviewed for this encounter**         ALLERGIES  Allergies[1]    PHYSICAL EXAM  VITAL SIGNS: /56   Pulse 66   Temp 37.1 °C (98.7 °F) (Temporal)   Resp 16   Ht 1.676 m (5' 6\")   Wt 89.9 kg (198 lb 3.1 oz)   LMP 04/15/2013   SpO2 96%   BMI 31.99 kg/m²    Constitutional: Awake and alert Non toxic  HENT: Normal inspection  Dry mucous membranes  Eyes: Normal inspection  Neck: Grossly normal range of motion.  Cardiovascular: Normal heart rate, Normal rhythm.    Thorax & Lungs: No respiratory distress  Abdomen: Soft, non-distended, nontender   Back: No CVA TTP  Skin: No obvious rash.  Extremities: Warm, well perfused.   Neurologic: Grossly normal   Psychiatric: Normal for situation      EKG/LABS  Results for orders placed or performed during the hospital encounter of 06/04/25   CBC with Differential    Collection Time: 06/04/25  5:59 PM   Result Value Ref Range    WBC 7.3 4.8 - 10.8 K/uL    RBC 4.45 4.20 - 5.40 M/uL    Hemoglobin 13.3 12.0 - " 16.0 g/dL    Hematocrit 40.0 37.0 - 47.0 %    MCV 89.9 81.4 - 97.8 fL    MCH 29.9 27.0 - 33.0 pg    MCHC 33.3 32.2 - 35.5 g/dL    RDW 44.5 35.9 - 50.0 fL    Platelet Count 227 164 - 446 K/uL    MPV 11.5 9.0 - 12.9 fL    Neutrophils-Polys 72.40 (H) 44.00 - 72.00 %    Lymphocytes 15.60 (L) 22.00 - 41.00 %    Monocytes 8.30 0.00 - 13.40 %    Eosinophils 2.30 0.00 - 6.90 %    Basophils 1.10 0.00 - 1.80 %    Immature Granulocytes 0.30 0.00 - 0.90 %    Nucleated RBC 0.00 0.00 - 0.20 /100 WBC    Neutrophils (Absolute) 5.29 1.82 - 7.42 K/uL    Lymphs (Absolute) 1.14 1.00 - 4.80 K/uL    Monos (Absolute) 0.61 0.00 - 0.85 K/uL    Eos (Absolute) 0.17 0.00 - 0.51 K/uL    Baso (Absolute) 0.08 0.00 - 0.12 K/uL    Immature Granulocytes (abs) 0.02 0.00 - 0.11 K/uL    NRBC (Absolute) 0.00 K/uL   Complete Metabolic Panel    Collection Time: 06/04/25  5:59 PM   Result Value Ref Range    Sodium 140 135 - 145 mmol/L    Potassium 3.7 3.6 - 5.5 mmol/L    Chloride 102 96 - 112 mmol/L    Co2 24 20 - 33 mmol/L    Anion Gap 14.0 7.0 - 16.0    Glucose 81 65 - 99 mg/dL    Bun 18 8 - 22 mg/dL    Creatinine 1.38 0.50 - 1.40 mg/dL    Calcium 9.2 8.5 - 10.5 mg/dL    Correct Calcium 9.4 8.5 - 10.5 mg/dL    AST(SGOT) 26 12 - 45 U/L    ALT(SGPT) 36 2 - 50 U/L    Alkaline Phosphatase 51 30 - 99 U/L    Total Bilirubin 0.5 0.1 - 1.5 mg/dL    Albumin 3.8 3.2 - 4.9 g/dL    Total Protein 6.6 6.0 - 8.2 g/dL    Globulin 2.8 1.9 - 3.5 g/dL    A-G Ratio 1.4 g/dL   Lipase    Collection Time: 06/04/25  5:59 PM   Result Value Ref Range    Lipase 24 11 - 82 U/L   ESTIMATED GFR    Collection Time: 06/04/25  5:59 PM   Result Value Ref Range    GFR (CKD-EPI) 42 (A) >60 mL/min/1.73 m 2       RADIOLOGY/PROCEDURES   I have independently interpreted the diagnostic imaging associated with this visit and am waiting the final reading from the radiologist.   My preliminary interpretation is as follows: Persistent left ureteral stone    Radiologist interpretation:  CT-RENAL  COLIC EVALUATION(A/P W/O)   Final Result      1.  There is an 8 mm stone in the proximal left ureter with moderate left-sided hydronephrosis. This is stable when compared with the prior exam.   2.  Atherosclerosis.   3.  Diverticulosis.          COURSE & MEDICAL DECISION MAKING    ASSESSMENT, COURSE AND PLAN  Care Narrative: This is a 65-year-old with recurrent kidney stones who presents with left-sided flank pain.  She says has been ongoing since the end of May.  She did have a CT scan that was performed as an outpatient which was notable for 2 quite large obstructing kidney stones 5 mm at the UVJ and 8 mm at the UPJ.  She arrives with normal vital signs is afebrile systemically well-appearing.  Her creatinine is 1.38, no significant RENE.  She is not septic or systemically ill appearing.  She did report previous nausea and vomiting but none here.  Urine is still pending.      8:06 PM  Dr. Tillman, Urology consulted.  He did request that we repeat the CT scan to see if she has progressive hydronephrosis he does agree with admission to the hospital, n.p.o. at midnight for stent placement tomorrow.    CT does show a millimeter stone in the proximal left ureter with moderate left-sided hydronephrosis    Unfortunately patient has been unable to provide a urine sample despite multiple attempts in the ER still at this point cannot rule out an underlying infection.  I did discuss with the inpatient team who will follow-up on this as an inpatient at this time given no signs of sepsis, no indication for empiric treatment or emergent intervention.    Of note her pain has been well-controlled with Dilaudid, Toradol, Zofran and IV fluids in the ER    Hydration: Based on the patient's presentation of Inability to take oral fluids the patient was given IV fluids. IV Hydration was used because oral hydration was not adequate alone. Upon recheck following hydration, the patient was improved.              DISPOSITION AND  "DISCUSSIONS  I have discussed management of the patient with the following physicians and FRANKLYN's:  Dr. Primo Clemente    Discussion of management with other [facility] or appropriate source(s): None     FINAL DIAGNOSIS  1. Left ureteral stone Acute   2. Hydronephrosis of left kidney Acute        Electronically signed by: Teresa Gomez M.D., 6/4/2025 7:37 PM           [1]   Allergies  Allergen Reactions    Bactrim      \"blisters inside of mouth and throat\"    Bee Swelling    Ampicillin Rash     "

## 2025-06-05 NOTE — H&P
[facility] MEDICINE HISTORY AND PHYSICAL     PATIENT ID:  NAME:  Majo Milner  MRN:               [id number]  YOB: 1960    Date of Admission: 6/4/2025     Attending: Makenna Welsh MD    Resident: Quin Bhagat MD    Primary Care Physician:  Lobo Piña MD    CC:  pain from kidney stone    HPI: Majo Milner is a 65 y.o. female with PMH significant for ureteral stones presented with increasing left flank plain since 5/24/2025.  Current pain level 2/10 after receiving pain medication.  Reports chills with nausea and vomiting.  Last ate 2 days ago but has been drinking Gatorade daily.  Denies dysuria.  Reports that she was scheduled for stent placement on 6/11/2025.    ERCourse:  On presentation to the ED patient afebrile, /83, HR 96, SpO2 93% in RA with RR of 18.  CBC grossly normal.  CMP notable for reduced GFR at 42  CT renal colic showed 8 mm stone in proximal left ureter with moderate left-sided hydronephrosis, stable from prior exam.  Atherosclerosis and diverticulosis  Received Dilaudid 1 mg, Toradol 15 mg, NS 1 L bolus and Zofran 4 mg  Urology consulted and recommended patient be admitted for stent placement in a.m. with pain management    REVIEW OF SYSTEMS:   Ten systems reviewed and were negative except as noted in the HPI.                PAST MEDICAL HISTORY:  Past Medical History[1]    PAST SURGICAL HISTORY:  Past Surgical History[2]    FAMILY HISTORY:  No family history on file.    SOCIAL HISTORY:   Social History:   Tobacco: no  Alcohol: occasionally  Recreational drugs (illegal and prescription): no  Employment: Manufacturing  Activity Level: Independent with activities of daily living.  Living situation:   Recent travel:  Denies.  Primary Care Provider: Lobo Piña MD    DIET:   Orders Placed This Encounter   Procedures    Diet NPO Restrict to: Sips with Medications     Standing Status:   Standing     Number of Occurrences:   1     Diet NPO Restrict to::    Sips with Medications [3]       ALLERGIES:  Allergies[3]    OUTPATIENT MEDICATIONS:  Medications - Current, Listed Continuously[4]    PHYSICAL EXAM:  Vitals:    25 2130 25 2200 25 2300 25 0000   BP: 119/59 119/56 124/59 118/59   Pulse: 79 71 75 67   Resp: 16 16  16   Temp:       TempSrc:       SpO2: 93% 93% 93% 96%   Weight:       Height:       , Temp (24hrs), Av.1 °C (98.7 °F), Min:37.1 °C (98.7 °F), Max:37.1 °C (98.7 °F)  , Pulse Oximetry: 96 %, O2 (LPM): 3, O2 Delivery Device: Nasal Cannula    Physical Exam  Constitutional:       General: She is in acute distress.      Appearance: She is obese. She is ill-appearing. She is not toxic-appearing.   HENT:      Mouth/Throat:      Mouth: Mucous membranes are moist.   Cardiovascular:      Rate and Rhythm: Normal rate and regular rhythm.      Heart sounds: Murmur heard.   Pulmonary:      Effort: Pulmonary effort is normal. No respiratory distress.      Breath sounds: Normal breath sounds. No wheezing or rales.   Abdominal:      General: There is no distension.      Palpations: Abdomen is soft.      Tenderness: There is no abdominal tenderness. There is left CVA tenderness. There is no guarding.   Musculoskeletal:      Right lower leg: No edema.      Left lower leg: No edema.   Skin:     General: Skin is warm.   Neurological:      General: No focal deficit present.      Mental Status: She is alert and oriented to person, place, and time.   Psychiatric:         Mood and Affect: Mood normal.         Behavior: Behavior normal.       LAB TESTS:   Recent Labs     25  1759   WBC 7.3   RBC 4.45   HEMOGLOBIN 13.3   HEMATOCRIT 40.0   MCV 89.9   MCH 29.9   RDW 44.5   PLATELETCT 227   MPV 11.5   NEUTSPOLYS 72.40*   LYMPHOCYTES 15.60*   MONOCYTES 8.30   EOSINOPHILS 2.30   BASOPHILS 1.10         Recent Labs     25  1759   SODIUM 140   POTASSIUM 3.7   CHLORIDE 102   CO2 24   BUN 18   CREATININE 1.38   CALCIUM 9.2   ALBUMIN 3.8     CULTURES:    Results       Procedure Component Value Units Date/Time    Urinalysis [[id number]]     Order Status: Sent Specimen: Urine           IMAGES:  CT-RENAL COLIC EVALUATION(A/P W/O)   Final Result      1.  There is an 8 mm stone in the proximal left ureter with moderate left-sided hydronephrosis. This is stable when compared with the prior exam.   2.  Atherosclerosis.   3.  Diverticulosis.        CONSULTS:   Urologist Dr. Krunal Tillman    ASSESSMENT/PLAN:   65 y.o. female admitted for    # Hydronephrosis with urinary obstruction due to ureteral calculus  Hx of ureteral stones followed by [facility]rology   Left ureteroscopy, laser lithotripsy and stent insertion scheduled for 6/11/2025  Reports increasing left flank pain since 5/24/2025 with associated chills, nausea and vomiting.  Placed on 3L of O2 in ED without recorded desat, unclear if for comfort given pain. No history of home oxygen  CT renal colic showed 8 mm stone in proximal left ureter with moderate left-sided hydronephrosis, which is stable from prior exam  Dr. Krunal Tillman with Urology consulted, recommended admission with pain management with stent placement in AM  Urine culture from 5/28/2025 [facility]    Plan  - Admit to the floor  - N.p.o. at midnight pending stent placement in a.m.  -  mL/h  -- Supplemental oxygen as need to maintain saturation  -- Continuous pulse ox  - Pain management  - CBC BMP in a.m.  - Urinalysis with urinary culture, if positive for UTI will start antibiotics    Core Measures:   Fluids:  ml/hr  Lines: PIV   Abx: none  DVT prophylaxis: SCDs  Code Status: FULL    I anticipate this patient will require at least two midnights for appropriate medical management, necessitating inpatient admission due to left hydronephrosis with urinary obstruction due to ureteral calculus requiring surgical intervention    Quin Bhagat MD  PGY-3  [facility]          [1]   Past Medical History:  Diagnosis Date     "Anesthesia     nausea and vomiting    Arrhythmia     occas ectopic beat    Dental disorder     upper flipper    GERD (gastroesophageal reflux disease)     Hashimoto's disease     Heart burn     Heart murmur     History of MRSA infection 2016    head and armpit     IBS (irritable bowel syndrome)     Indigestion     Kidney calculi     kidney stones    Other specified disorder of intestines     IBS    Sleep apnea     no cpap since losing 60 pounds     Snoring    [2]   Past Surgical History:  Procedure Laterality Date    CYSTOSCOPY STENT PLACEMENT  5/23/2018    Procedure: CYSTOSCOPY;  Surgeon: Yifan Perdomo M.D.;  Location: SURGERY [facility];  Service: Urology    URETEROSCOPY Right 5/23/2018    Procedure: URETEROSCOPY;  Surgeon: Yifan Perdomo M.D.;  Location: SURGERY [facility];  Service: Urology    LASERTRIPSY Right 5/23/2018    Procedure: LASERTRIPSY- LITHO;  Surgeon: Yifan Perdomo M.D.;  Location: SURGERY [facility];  Service: Urology    JIMMIE BY LAPAROSCOPY  7/17/2013    Performed by John Carrasco M.D. at SURGERY [facility]    CYSTOSCOPY STENT PLACEMENT  9/29/2011    Performed by YIFAN PERDOMO at SURGERY [facility]    URETEROSCOPY  9/29/2011    Performed by YIFAN PERDOMO at SURGERY [facility]    LASERTRIPSY  9/29/2011    Performed by YIFAN PERDOMO at SURGERY [facility]    LITHOTRIPSY     [3]   Allergies  Allergen Reactions    Bactrim      \"blisters inside of mouth and throat\"    Bee Swelling    Ampicillin Rash   [4]   Current Facility-Administered Medications:     levothyroxine (Synthroid) tablet 137 mcg, 137 mcg, Oral, AM ES, Quin Clemente M.D.    sertraline (Zoloft) tablet 150 mg, 150 mg, Oral, DAILY, Quin Clemente M.D.    NS infusion, , Intravenous, Continuous, Quin Clemente M.D.    ondansetron (Zofran) syringe/vial injection 4 mg, 4 mg, Intravenous, Q4HRS PRN **OR** ondansetron (Zofran ODT) dispertab 4 mg, 4 mg, Oral, Q4HRS PRN, Quin Clemente M.D.    morphine 4 MG/ML " injection 4 mg, 4 mg, Intravenous, Q4HRS PRN **OR** HYDROmorphone (Dilaudid) injection 1 mg, 1 mg, Intravenous, Q4HRS PRN, Quin Clemente M.D.    Current Outpatient Medications:     ondansetron (ZOFRAN) 4 MG Tab tablet, Take 1 Tablet by mouth 3 times a day as needed., Disp: , Rfl:     ketorolac (TORADOL) 10 MG Tab, Take 1 Tablet by mouth every 6 hours as needed for Moderate Pain (for renal colic/kidney stone pain)., Disp: 30 Tablet, Rfl: 0    tamsulosin (FLOMAX) 0.4 MG capsule, Take 0.4 mg once daily until stone passage occurs or for up to 4 weeks., Disp: 30 Capsule, Rfl: 0    levothyroxine (SYNTHROID) 137 MCG Tab, Take 1 Tablet by mouth every morning on an empty stomach., Disp: 100 Tablet, Rfl: 3    sertraline (ZOLOFT) 100 MG Tab, TAKE 1 & 1/2 (ONE & ONE-HALF) TABLETS BY MOUTH ONCE DAILY IN THE MORNING FOR  IBS (Patient taking differently: Take 150 mg by mouth every day. 150 mg = 1.5 tabs), Disp: 100 Tablet, Rfl: 0    pantoprazole (PROTONIX) 40 MG Tablet Delayed Response, Take 1 Tablet by mouth 2 times a day., Disp: 200 Tablet, Rfl: 3    Probiotic Product (PROBIOTIC DAILY PO), Take 1 Tablet by mouth every day., Disp: , Rfl:     aspirin (ASA) 81 MG Chew Tab chewable tablet, Chew 1 Tablet every day. Indications: Fever, blood thinner, Disp: , Rfl:     VITAMIN B COMPLEX-C PO, Take 1 Tab by mouth every morning., Disp: , Rfl:     Cholecalciferol (VITAMIN D) 400 UNIT Tab, Take 1 Tablet by mouth every morning., Disp: , Rfl:

## 2025-06-05 NOTE — PROGRESS NOTES
[facility] FAMILY MEDICINE PROGRESS NOTE     Attending: Makenna Welsh MD    Resident: Lobo Piña MD    PATIENT: Majo Milner; [id number]; 1960    ID:   65 y.o. female with significant past medical history of kidney stones as well as hypothyroidism, hyperlipidemia, mood disorder presenting with left-sided flank pain found to have an 8 mm renal stone receiving stenting on 6/5/2025    SUBJECTIVE: No acute events overnight, patient was admitted.  Reports that pain is controlled with hydromorphone and Toradol.  Anticipating stent later today.  No other complaints at this time    OBJECTIVE:  Temp:  [36 °C (96.8 °F)-37.1 °C (98.7 °F)] 36.4 °C (97.5 °F)  Pulse:  [] 103  Resp:  [12-26] 16  BP: (115-168)/(56-83) 151/76  SpO2:  [85 %-97 %] 90 %    No intake or output data in the 24 hours ending 06/05/25 0711    PE:  General: No acute distress, resting comfortably in bed.  HEENT: NC/AT. EOMI. MMM  Cardiovascular: RRR, no m/r/g, normal S1/S2  Respiratory: Symmetric inspiratory effort. CTAB with no adventitious sounds  Abdomen: BS+, soft, nondistended, mild tenderness with palpation of the right side of the abdomen, left side of the abdomen causes moderate to severe tenderness.    EXT:  APPIAH, 2+ radial pulses, no lower extremity edema bilaterally  : Left-sided CVA tenderness, mild right-sided CVA tenderness  Neuro: Non focal, alert and oriented    LABS:  Recent Labs     06/04/25  1759   WBC 7.3   RBC 4.45   HEMOGLOBIN 13.3   HEMATOCRIT 40.0   MCV 89.9   MCH 29.9   RDW 44.5   PLATELETCT 227   MPV 11.5   NEUTSPOLYS 72.40*   LYMPHOCYTES 15.60*   MONOCYTES 8.30   EOSINOPHILS 2.30   BASOPHILS 1.10     Recent Labs     06/04/25  1759   SODIUM 140   POTASSIUM 3.7   CHLORIDE 102   CO2 24   BUN 18   CREATININE 1.38   CALCIUM 9.2   ALBUMIN 3.8     Estimated GFR/CRCL = Estimated Creatinine Clearance: 45.3 mL/min (by C-G formula based on SCr of 1.38 mg/dL).  Recent Labs     06/04/25  1759   GLUCOSE 81     Recent Labs      "06/04/25  1759   ASTSGOT 26   ALTSGPT 36   TBILIRUBIN 0.5   ALKPHOSPHAT 51   GLOBULIN 2.8             No results for input(s): \"INR\", \"APTT\", \"FIBRINOGEN\" in the last 72 hours.    Invalid input(s): \"DIMER\"    IMAGING:  DX-PORTABLE FLUORO > 1 HOUR   Final Result      Portable fluoroscopy utilized for 8 seconds.      INTERPRETING LOCATION: [address] , [place], [address]      [id number] VIEW   Final Result      Digitized intraoperative radiograph is submitted for review. This examination is not for diagnostic purpose but for guidance during a surgical procedure. Please see the patient's chart for full procedural details.         INTERPRETING LOCATION: [address] , [place], [address]      CT-RENAL COLIC EVALUATION(A/P W/O)   Final Result      1.  There is an 8 mm stone in the proximal left ureter with moderate left-sided hydronephrosis. This is stable when compared with the prior exam.   2.  Atherosclerosis.   3.  Diverticulosis.          MEDS:  Scheduled Medications[1]  PRN medications: ondansetron **OR** ondansetron, [DISCONTINUED] morphine injection **OR** HYDROmorphone    ASSESSMENT/PLAN:   Majo Milner is a 65 y.o. female who presented with left-sided flank pain found to have 8 mm left-sided ureteral stone on imaging.  Urology plans for stent on 6/5 and recommends antibiotics following UA and culture    * Hydronephrosis with urinary obstruction due to ureteral calculus- (present on admission)  Assessment & Plan  Hx of ureteral stones followed by [facility]   Left ureteroscopy, laser lithotripsy and stent insertion scheduled for 6/11/2025  Reports increasing left flank pain since 5/24/2025 with associated chills, nausea and vomiting.  Placed on 3L of O2 in ED without recorded desat, unclear if for comfort given pain. No history of home oxygen  CT renal colic showed 8 mm stone in proximal left ureter with moderate left-sided hydronephrosis, which is stable from prior exam  Dr. Krunal Tillman with Urology " consulted, recommended admission with pain management with stent placement on 6/5.  Dr. Tillman also recommends antibiotics after UA and culture with ciprofloxacin  Urine culture from 5/28/2025 [facility].  Following stent placement plan is for patient to follow-up with Dr. Lou for definitive stone removal on June 11.  Following surgery this provider spoke with patient who requested to remain inpatient for pain management and observation as she continues to have an oxygen demand at this time.  - Admit to the floor  - N.p.o. at midnight pending stent placement in a.m.  - Continue  mL/h  -- Supplemental oxygen as need to maintain saturation  -- Continuous pulse ox  - Pain management, patient would like to avoid morphine and use Tylenol, Toradol, and hydromorphone  - CBC BMP in a.m.  - Urinalysis with urinary culture, per recommendation of urology will start ciprofloxacin  -Monitor urine culture  -Titrate oxygen as tolerated    Mood disorder (HCC)  Assessment & Plan  Continue home sertraline    Hypothyroidism- (present on admission)  Assessment & Plan  Continue home Synthroid 137 mcg daily      #Disposition: Admitted for surgical management/stent placement of left ureteral 8 mm renal stone    Core Measures:  Fluids: NS at 125 mL/h  Lines: PIV  Abx: Ciprofloxacin  Diet: Regular  PPX: Lovenox    CODE STATUS: Full    Lobo Piña MD  PGY2  [facility]         [1]   Scheduled Medications   Medication Dose Frequency    levothyroxine  137 mcg AM ES    sertraline  150 mg DAILY    ciprofloxacin  250 mg Q12HRS    enoxaparin (LOVENOX) injection  40 mg DAILY AT 1800

## 2025-06-05 NOTE — OP REPORT
SURGEON: Dr. Sedrick Tillman      ANESTHESIA: General (general endotracheal tube)      PRE-OPERATIVE DIAGNOSIS: left ureteral stone    POST-OPERATIVE DIAGNOSIS: Same      NAME OF PROCEDURE: Cystoscopy, left  [id number]     FINDINGS OF PROCEDURE: left distal and proximal ureteral calculi    EBL: 0cc      COMPLICATIONS: None      PATIENT CONDITION: stable      INDICATIONS: Majo Milner is a 65 y.o. female who agreed to above procedure for further management of kidney stones after complete discussion of risks, benefits, and alternatives.      PROCEDURE:     After informed consent was obtained in the preoperative care unit, the patient was taken to the OR on a stretcher. The patient was properly identified and placed in supine position per OR protocol. The patient was given a prophylactic dose of ancef 2 grams. General (general endotracheal tube) was administered. The patient was then placed in dorsal lithotomy, prepped and draped in a standard sterile fashion.  A timeout was performed with all parties in agreement.       A 22Fr rigid cystoscope was inserted per urethra. A sensor wire was passed into the left ureteral orifice up to the level of the kidney, confirmed under fluoroscopy.     A [id number] ureteral stent was passed over the sensor wire and into the kidney, with it’s position confirmed under fluoroscopic guidance. The wire was removed and a good proximal and distal curl were noted in the renal pelvis and bladder respectively with fluoroscopy. The bladder was drained and the cystoscope was removed.  No Loco catheter was placed.. This concluded the procedure. The patient tolerated it well and was transferred to the PACU in stable condition.        DISPOSITION: The patient will be admitted with plan for definitive stone removal on June 11, 2025 with Dr. Lou as already scheduled.  She likely can be discharged today.    Krunal Tillman M.D., F.A.C.S.  Urologic Oncology  Vice-Chair, Department of  [facility]

## 2025-06-05 NOTE — ANESTHESIA PREPROCEDURE EVALUATION
Case: [id number] Date/Time: 06/05/25 1134    Procedure: CYSTOSCOPY, WITH URETERAL STENT INSERTION (Left)    Location: [facility] OR 03 / SURGERY [facility]    Surgeons: Krunal Tillman M.D.            Relevant Problems   ANESTHESIA   (positive) Obstructive sleep apnea syndrome in adult      GI   (positive) Gastroesophageal reflux disease         (positive) Hydronephrosis with urinary obstruction due to ureteral calculus      ENDO   (positive) Hypothyroidism       Physical Exam    Airway   Mallampati: II  TM distance: >3 FB  Neck ROM: full       Cardiovascular - normal exam  Rhythm: regular  Rate: normal    (-) murmur     Dental - normal exam           Pulmonary - normal examBreath sounds clear to auscultation     Abdominal    Neurological - normal exam                   Anesthesia Plan    ASA 2       Plan - general       Airway plan will be LMA          Induction: intravenous    Postoperative Plan: Postoperative administration of opioids is intended.    Pertinent diagnostic labs and testing reviewed    Informed Consent:    Anesthetic plan and risks discussed with patient.    Use of blood products discussed with: patient whom consented to blood products.

## 2025-06-05 NOTE — ED NOTES
"Bedside report received from off going RN/tech: Stefani, assumed care of patient.  POC discussed with patient. Call light within reach, all needs addressed at this time.       Fall risk interventions in place: Patient's personal possessions are with in their safe reach, Place socks on patient, Place fall risk sign on patient's door, and Keep floor surfaces clean and dry (all applicable per [facility] Fall risk assessment)   Continuous monitoring: Cardiac Leads, Pulse Ox, or Blood Pressure  IVF/IV medications: Not Applicable   Oxygen: How many liters 3L, Traced the line to wall oxygen, and No oxygen tank in room  Bedside sitter: Not Applicable   Isolation: Not Applicable    /56   Pulse 71   Temp 37.1 °C (98.7 °F) (Temporal)   Resp 16   Ht 1.676 m (5' 6\")   Wt 89.9 kg (198 lb 3.1 oz)   SpO2 93%  - 3L/NC  "

## 2025-06-05 NOTE — H&P
Surgical H&P    Author: Krunal Tillman M.D. Date & Time created: 2025   11:13 AM     History:  Left ureteral calculi with pain and hydro for left ureteral stent.       Review of Systems   Genitourinary:  Positive for flank pain.   All other systems reviewed and are negative.    Hemodynamics:  Temp (24hrs), Av.1 °C (98.7 °F), Min:37.1 °C (98.7 °F), Max:37.1 °C (98.7 °F)  Temperature: 37.1 °C (98.7 °F)  Pulse  Av.8  Min: 66  Max: 96   Blood Pressure : 136/63     Respiratory:    Respiration: 16, Pulse Oximetry: 93 %           Neuro:  GCS       Fluids:    Intake/Output Summary (Last 24 hours) at 2025 1113  Last data filed at 2025 0737  Gross per 24 hour   Intake --   Output 0 ml   Net 0 ml     Weight: 89.9 kg (198 lb 3.1 oz)  Current Diet Order   Procedures    Diet NPO Restrict to: Sips with Medications     Physical Exam  Constitutional:       Appearance: Normal appearance. She is normal weight.   Neurological:      Mental Status: She is alert.       Labs:  Recent Results (from the past 24 hours)   CBC with Differential    Collection Time: 25  5:59 PM   Result Value Ref Range    WBC 7.3 4.8 - 10.8 K/uL    RBC 4.45 4.20 - 5.40 M/uL    Hemoglobin 13.3 12.0 - 16.0 g/dL    Hematocrit 40.0 37.0 - 47.0 %    MCV 89.9 81.4 - 97.8 fL    MCH 29.9 27.0 - 33.0 pg    MCHC 33.3 32.2 - 35.5 g/dL    RDW 44.5 35.9 - 50.0 fL    Platelet Count 227 164 - 446 K/uL    MPV 11.5 9.0 - 12.9 fL    Neutrophils-Polys 72.40 (H) 44.00 - 72.00 %    Lymphocytes 15.60 (L) 22.00 - 41.00 %    Monocytes 8.30 0.00 - 13.40 %    Eosinophils 2.30 0.00 - 6.90 %    Basophils 1.10 0.00 - 1.80 %    Immature Granulocytes 0.30 0.00 - 0.90 %    Nucleated RBC 0.00 0.00 - 0.20 /100 WBC    Neutrophils (Absolute) 5.29 1.82 - 7.42 K/uL    Lymphs (Absolute) 1.14 1.00 - 4.80 K/uL    Monos (Absolute) 0.61 0.00 - 0.85 K/uL    Eos (Absolute) 0.17 0.00 - 0.51 K/uL    Baso (Absolute) 0.08 0.00 - 0.12 K/uL    Immature Granulocytes (abs) 0.02 0.00  - 0.11 K/uL    NRBC (Absolute) 0.00 K/uL   Complete Metabolic Panel    Collection Time: 06/04/25  5:59 PM   Result Value Ref Range    Sodium 140 135 - 145 mmol/L    Potassium 3.7 3.6 - 5.5 mmol/L    Chloride 102 96 - 112 mmol/L    Co2 24 20 - 33 mmol/L    Anion Gap 14.0 7.0 - 16.0    Glucose 81 65 - 99 mg/dL    Bun 18 8 - 22 mg/dL    Creatinine 1.38 0.50 - 1.40 mg/dL    Calcium 9.2 8.5 - 10.5 mg/dL    Correct Calcium 9.4 8.5 - 10.5 mg/dL    AST(SGOT) 26 12 - 45 U/L    ALT(SGPT) 36 2 - 50 U/L    Alkaline Phosphatase 51 30 - 99 U/L    Total Bilirubin 0.5 0.1 - 1.5 mg/dL    Albumin 3.8 3.2 - 4.9 g/dL    Total Protein 6.6 6.0 - 8.2 g/dL    Globulin 2.8 1.9 - 3.5 g/dL    A-G Ratio 1.4 g/dL   Lipase    Collection Time: 06/04/25  5:59 PM   Result Value Ref Range    Lipase 24 11 - 82 U/L   ESTIMATED GFR    Collection Time: 06/04/25  5:59 PM   Result Value Ref Range    GFR (CKD-EPI) 42 (A) >60 mL/min/1.73 m 2   Urinalysis    Collection Time: 06/05/25  9:16 AM    Specimen: Urine   Result Value Ref Range    Color Dark Yellow     Character Clear     Specific Gravity 1.029 <1.035    Ph 5.5 5.0 - 8.0    Glucose Negative Negative mg/dL    Ketones 80 (A) Negative mg/dL    Protein Negative Negative mg/dL    Bilirubin Negative Negative    Urobilinogen, Urine 1.0 <=1.0 EU/dL    Nitrite Negative Negative    Leukocyte Esterase Small (A) Negative    Occult Blood Negative Negative    Micro Urine Req Microscopic    URINE MICROSCOPIC (W/UA)    Collection Time: 06/05/25  9:16 AM   Result Value Ref Range    WBC 11-20 (A) /hpf    RBC 0-2 0 - 2 /hpf    Bacteria Few (A) None /hpf    Epithelial Cells 3-5 0 - 5 /hpf    Ca Oxalate Crystal Present (A) Absent /hpf    Urine Casts 0-2 0 - 2 /lpf     Medical Decision Making, by Problem:  Active Hospital Problems    Diagnosis     Hydronephrosis with urinary obstruction due to ureteral calculus [N13.2]      Plan:  Left ureteral stent placement. Risks and benefits discussed and accepted.     Krunal CHAND  Primo BOWEN, FTAYLOR.  Urologic Oncology  Vice-Chair, Department of Surgery  [facility]

## 2025-06-05 NOTE — PROGRESS NOTES
Bedside report received from off going RN, assumed care of patient.  POC discussed with patient. Call light within reach, all needs addressed at this time.         Fall risk interventions in place: Patient's personal possessions are with in their safe reach, Place fall risk sign on patient's door, and Keep floor surfaces clean and dry   Continuous monitoring: Cardiac Leads, Pulse Ox, or Blood Pressure  IVF/IV medications: Infusion per MAR (List Med(s)) NS @ 125ml//hour  Oxygen: How many liters 3L, room air baseline  Bedside sitter: Not Applicable   Isolation: Not Applicable

## 2025-06-05 NOTE — ANESTHESIA PROCEDURE NOTES
Airway    Date/Time: 6/5/2025 12:04 PM    Performed by: Brendan Fajardo M.D.  Authorized by: Brendan Fajardo M.D.    Location:  OR  Urgency:  Elective  Indications for Airway Management:  Anesthesia      Spontaneous Ventilation: absent    Sedation Level:  Deep  Preoxygenated: Yes    Final Airway Type:  Supraglottic airway  Final Supraglottic Airway:  Standard LMA    SGA Size:  4  Number of Attempts at Approach:  1

## 2025-06-05 NOTE — PROGRESS NOTES
Assumed care of pt 06/05/2025  Report received from Rahul RN.   AM assessment complete. Education given to the pt at bedside all questions answered.  Bed is locked and in the lowest position, call light is within reach. Hourly rounding in place. Care continuous.

## 2025-06-05 NOTE — CONSULTS
Surgery Urology Consultation    Date of Service  6/4/2025    Referring Physician  Teresa Gomez M.D.    Consulting Physician  Krunal Tillman M.D.    Reason for Consultation  Left ureteral calculi, pain.    History of Presenting Illness  65 y.o. female who presented 6/4/2025 with proximal and distal left ureteral calculi. Seen by Dr. Cho 5/29 and scheduled for stone removal 6/11. Presented with pain. No fever, nausea, vomiting. Creat 1.38. WBC 7.3. UA pending. CT with moderate hydro stable to 5/29 CT.     Review of Systems  Review of Systems   Genitourinary:  Positive for flank pain.   All other systems reviewed and are negative.      Past Medical History   has a past medical history of Anesthesia, Arrhythmia, Dental disorder, GERD (gastroesophageal reflux disease), Hashimoto's disease, Heart burn, Heart murmur, History of MRSA infection (2016), IBS (irritable bowel syndrome), Indigestion, Kidney calculi, Other specified disorder of intestines, Sleep apnea, and Snoring.    Surgical History   has a past surgical history that includes lithotripsy; cystoscopy stent placement (9/29/2011); ureteroscopy (9/29/2011); lasertripsy (9/29/2011); ness by laparoscopy (7/17/2013); cystoscopy stent placement (5/23/2018); ureteroscopy (Right, 5/23/2018); and lasertripsy (Right, 5/23/2018).    Family History  family history is not on file.    Social History   reports that she has never smoked. She has never used smokeless tobacco. She reports current alcohol use. She reports that she does not use drugs.    Medications  Prior to Admission Medications   Prescriptions Last Dose Informant Patient Reported? Taking?   Cholecalciferol (VITAMIN D) 400 UNIT Tab  Patient Yes No   Sig: Take 1 Tablet by mouth every morning.   Probiotic Product (PROBIOTIC DAILY PO)   Yes No   Sig: Take  by mouth.   VITAMIN B COMPLEX-C PO  Patient Yes No   Sig: Take 1 Tab by mouth every morning.   aspirin (ASA) 81 MG Chew Tab chewable tablet   Yes No  "  Sig: Chew 1 Tablet every day. Indications: Fever, blood thinner   ketorolac (TORADOL) 10 MG Tab   No No   Sig: Take 1 Tablet by mouth every 6 hours as needed for Moderate Pain (for renal colic/kidney stone pain).   levothyroxine (SYNTHROID) 137 MCG Tab   No No   Sig: Take 1 Tablet by mouth every morning on an empty stomach.   pantoprazole (PROTONIX) 40 MG Tablet Delayed Response   No No   Sig: Take 1 Tablet by mouth 2 times a day.   sertraline (ZOLOFT) 100 MG Tab   No No   Sig: TAKE 1 & 1/2 (ONE & ONE-HALF) TABLETS BY MOUTH ONCE DAILY IN THE MORNING FOR  IBS   tamsulosin (FLOMAX) 0.4 MG capsule   No No   Sig: Take 0.4 mg once daily until stone passage occurs or for up to 4 weeks.   triamcinolone acetonide (KENALOG) 0.1 % Cream   No No   Sig: Apply 1 Application topically as needed (itching). Indications: Allergic Contact Dermatitis   Patient not taking: Reported on 5/28/2025      Facility-Administered Medications: None       Allergies  Allergies[1]    Physical Exam  Temp:  [37.1 °C (98.7 °F)] 37.1 °C (98.7 °F)  Pulse:  [96] 96  Resp:  [18] 18  BP: (151)/(83) 151/83  SpO2:  [93 %] 93 %    Physical Exam    Fluids      Laboratory  Recent Labs     06/04/25  1759   WBC 7.3   RBC 4.45   HEMOGLOBIN 13.3   HEMATOCRIT 40.0   MCV 89.9   MCH 29.9   MCHC 33.3   RDW 44.5   PLATELETCT 227   MPV 11.5     Recent Labs     06/04/25  1759   SODIUM 140   POTASSIUM 3.7   CHLORIDE 102   CO2 24   GLUCOSE 81   BUN 18   CREATININE 1.38   CALCIUM 9.2                     Imaging  CT-RENAL COLIC EVALUATION(A/P W/O)    (Results Pending)       Assessment/Plan  Left ureteral calculi and pain. Admission to hospitalist service. Pain management.     Krunal Tillman M.D., F.A.C.S.  Urologic Oncology  Vice-Chair, Department of Surgery  [facility]               [1]   Allergies  Allergen Reactions    Bactrim      \"blisters inside of mouth and throat\"    Bee Swelling    Ampicillin Rash     "

## 2025-06-05 NOTE — OR NURSING
1227-Pt arrived from OR, resting calmly with even, unlabored breathing, vss, no pain, no nausea, site CDI and soft to palpation.    1256-RN made attempt to contact pt's wife, Cierra and provided updates, no answer at this time.    1258-RN called report to [place], RN    1329-Pt transferred in [facility] with transport tech to floor.  Vss, no pain, no nausea, site CDI and soft to palpation.

## 2025-06-05 NOTE — ANESTHESIA TIME REPORT
Anesthesia Start and Stop Event Times       Date Time Event    6/5/2025 1139 Ready for Procedure     1152 Anesthesia Start     1311 Anesthesia Stop          Responsible Staff  06/05/25      Name Role Begin End    Brendan Fajardo M.D. Anesth 1152 1311          Overtime Reason:  no overtime (within assigned shift)    Comments:

## 2025-06-06 ENCOUNTER — PHARMACY VISIT (OUTPATIENT)
Dept: PHARMACY | Facility: MEDICAL CENTER | Age: 65
End: 2025-06-06
Payer: COMMERCIAL

## 2025-06-06 VITALS
HEART RATE: 73 BPM | TEMPERATURE: 97.6 F | BODY MASS INDEX: 31.85 KG/M2 | HEIGHT: 66 IN | OXYGEN SATURATION: 94 % | RESPIRATION RATE: 17 BRPM | SYSTOLIC BLOOD PRESSURE: 134 MMHG | DIASTOLIC BLOOD PRESSURE: 61 MMHG | WEIGHT: 198.19 LBS

## 2025-06-06 LAB
ANION GAP SERPL CALC-SCNC: 14 MMOL/L (ref 7–16)
BUN SERPL-MCNC: 10 MG/DL (ref 8–22)
CALCIUM SERPL-MCNC: 8.3 MG/DL (ref 8.5–10.5)
CHLORIDE SERPL-SCNC: 110 MMOL/L (ref 96–112)
CO2 SERPL-SCNC: 18 MMOL/L (ref 20–33)
CREAT SERPL-MCNC: 0.73 MG/DL (ref 0.5–1.4)
ERYTHROCYTE [DISTWIDTH] IN BLOOD BY AUTOMATED COUNT: 43.5 FL (ref 35.9–50)
GFR SERPLBLD CREATININE-BSD FMLA CKD-EPI: 90 ML/MIN/1.73 M 2
GLUCOSE SERPL-MCNC: 94 MG/DL (ref 65–99)
HCT VFR BLD AUTO: 35.2 % (ref 37–47)
HGB BLD-MCNC: 11.4 G/DL (ref 12–16)
MCH RBC QN AUTO: 29.5 PG (ref 27–33)
MCHC RBC AUTO-ENTMCNC: 32.4 G/DL (ref 32.2–35.5)
MCV RBC AUTO: 91 FL (ref 81.4–97.8)
PLATELET # BLD AUTO: 193 K/UL (ref 164–446)
PMV BLD AUTO: 11.6 FL (ref 9–12.9)
POTASSIUM SERPL-SCNC: 4.3 MMOL/L (ref 3.6–5.5)
RBC # BLD AUTO: 3.87 M/UL (ref 4.2–5.4)
SODIUM SERPL-SCNC: 142 MMOL/L (ref 135–145)
WBC # BLD AUTO: 7.3 K/UL (ref 4.8–10.8)

## 2025-06-06 PROCEDURE — 700111 HCHG RX REV CODE 636 W/ 250 OVERRIDE (IP): Mod: JZ

## 2025-06-06 PROCEDURE — 700102 HCHG RX REV CODE 250 W/ 637 OVERRIDE(OP)

## 2025-06-06 PROCEDURE — A9270 NON-COVERED ITEM OR SERVICE: HCPCS

## 2025-06-06 PROCEDURE — 80048 BASIC METABOLIC PNL TOTAL CA: CPT

## 2025-06-06 PROCEDURE — 700105 HCHG RX REV CODE 258

## 2025-06-06 PROCEDURE — RXMED WILLOW AMBULATORY MEDICATION CHARGE

## 2025-06-06 PROCEDURE — 700111 HCHG RX REV CODE 636 W/ 250 OVERRIDE (IP)

## 2025-06-06 PROCEDURE — 85027 COMPLETE CBC AUTOMATED: CPT

## 2025-06-06 RX ORDER — KETOROLAC TROMETHAMINE 10 MG/1
10 TABLET, FILM COATED ORAL EVERY 6 HOURS PRN
Qty: 30 TABLET | Refills: 0 | Status: SHIPPED | OUTPATIENT
Start: 2025-06-06

## 2025-06-06 RX ORDER — ACETAMINOPHEN 500 MG
1000 TABLET ORAL EVERY 6 HOURS PRN
COMMUNITY
Start: 2025-06-06

## 2025-06-06 RX ORDER — CIPROFLOXACIN 250 MG/1
250 TABLET, FILM COATED ORAL EVERY 12 HOURS
Qty: 8 TABLET | Refills: 0 | Status: ON HOLD | OUTPATIENT
Start: 2025-06-06 | End: 2025-06-11

## 2025-06-06 RX ORDER — ACETAMINOPHEN 500 MG
1000 TABLET ORAL EVERY 6 HOURS PRN
Status: DISCONTINUED | OUTPATIENT
Start: 2025-06-06 | End: 2025-06-06 | Stop reason: HOSPADM

## 2025-06-06 RX ORDER — KETOROLAC TROMETHAMINE 10 MG/1
10 TABLET, FILM COATED ORAL EVERY 6 HOURS PRN
Status: DISCONTINUED | OUTPATIENT
Start: 2025-06-06 | End: 2025-06-06 | Stop reason: HOSPADM

## 2025-06-06 RX ADMIN — SERTRALINE 150 MG: 100 TABLET, FILM COATED ORAL at 04:11

## 2025-06-06 RX ADMIN — ACETAMINOPHEN 1000 MG: 500 TABLET ORAL at 08:51

## 2025-06-06 RX ADMIN — CIPROFLOXACIN HYDROCHLORIDE 250 MG: 250 TABLET, FILM COATED ORAL at 04:11

## 2025-06-06 RX ADMIN — KETOROLAC TROMETHAMINE 10 MG: 10 TABLET, FILM COATED ORAL at 08:51

## 2025-06-06 RX ADMIN — SODIUM CHLORIDE: 9 INJECTION, SOLUTION INTRAVENOUS at 00:02

## 2025-06-06 RX ADMIN — HYDROMORPHONE HYDROCHLORIDE 0.25 MG: 1 INJECTION, SOLUTION INTRAMUSCULAR; INTRAVENOUS; SUBCUTANEOUS at 00:01

## 2025-06-06 RX ADMIN — SODIUM CHLORIDE: 9 INJECTION, SOLUTION INTRAVENOUS at 08:52

## 2025-06-06 RX ADMIN — KETOROLAC TROMETHAMINE 10 MG: 10 TABLET, FILM COATED ORAL at 15:02

## 2025-06-06 RX ADMIN — ACETAMINOPHEN 1000 MG: 500 TABLET ORAL at 15:02

## 2025-06-06 RX ADMIN — ONDANSETRON 4 MG: 4 TABLET, ORALLY DISINTEGRATING ORAL at 00:01

## 2025-06-06 RX ADMIN — LEVOTHYROXINE SODIUM 137 MCG: 0.14 TABLET ORAL at 04:12

## 2025-06-06 ASSESSMENT — PAIN DESCRIPTION - PAIN TYPE
TYPE: ACUTE PAIN

## 2025-06-06 NOTE — CARE PLAN
The patient is Stable - Low risk of patient condition declining or worsening    Shift Goals  Clinical Goals: rate pain <7 by end of shift  Patient Goals: comfort  Family Goals: none present    Progress made toward(s) clinical / shift goals:  Pt alert and oriented, makes needs known. Medications administered per MAR including PRN for pain. Urinating without pain per patient. Fall precautions in place. Currently on 3L O2 via NC post procedure.    Patient is not progressing towards the following goals:

## 2025-06-06 NOTE — PROGRESS NOTES
4 Eyes Skin Assessment Completed by PILAR Jacinto and PILAR Enamorado.    Skin assessment is primarily focused on high risk bony prominences. Pay special attention to skin beneath and around medical devices, high risk bony prominences, skin to skin areas and areas where the patient lacks sensation to feel pain and areas where the patient previously had breakdown.     Head (Occipital):  WDL   Ears (Under Medical Devices): Pink and Blanching   Nose (Under Medical Devices): Pink and Blanching   Mouth:  WDL   Neck: WDL   Breast/Chest:  WDL   Shoulder Blades:  WDL   Spine:   WDL   (R) Arm/Elbow/Hand: WDL   (L) Arm/Elbow/Hand: WDL   Abdomen: WDL   Pannus/Groin:  WDL   Sacrum/Coccyx:   WDL   (R) Ischial Tuberosity (Sit Bones):  WDL   (L) Ischial Tuberosity (Sit Bones):  WDL   (R) Leg:  WDL   (L) Leg:  WDL   (R) Heel:  Pink and Blanching   (R) Foot/Toe: WDL   (L) Heel: Pink and Blanching   (L) Foot/Toe:  WDL       DEVICES IN USE:   Respiratory Devices:  Nasal cannula  Feeding Devices:  N/A   Lines & BP Monitoring Devices:  Peripheral IV    Orthopedic Devices:  N/A  Miscellaneous Devices:  N/A    PROTOCOL INTERVENTIONS:   Nasal Cannula with Gray Foams:  Already in place    WOUND PHOTOS:   N/A no wounds identified    WOUND CONSULT:   N/A, no advanced wound care needs identified

## 2025-06-06 NOTE — CARE PLAN
The patient is Watcher - Medium risk of patient condition declining or worsening    Shift Goals  Clinical Goals: rate pain <7 by end of shift  Patient Goals: comfort  Family Goals: none present    Progress made toward(s) clinical / shift goals: pts pain being managed with use of PRN medication (See MAR), changes made to PRN medications per pts request. Pt resting comfortably breathing even and unlabored. Bed in low position for safety.     Patient is not progressing towards the following goals: n/a

## 2025-06-06 NOTE — PROGRESS NOTES
Discharge orders received.  Patient arrived to the discharge lounge.  PIV removed by discharge RN. Meds to beds medications verified by discharge RN, bag of medications given to patient.  Instructions given, medications reviewed and general discharge education provided to patient.  Follow up appointments discussed.  Patient verbalized understanding of dc instructions and prescriptions. Patient educated on signs and symptoms of new urinary tract infection, or worsening kidney stones and when to call 911 or come back to the emergency department. Patient signed discharge instructions.  Patient verbalized she had all belongings with her, Denied having any home medications locked in our inpatient pharmacy that  she needs back. Patient ambulated with steady gait from discharge lounge to private vehicle. Patient left via car with spouse to home in stable condition.

## 2025-06-06 NOTE — DISCHARGE SUMMARY
"[facility] Discharge Summary    Attending: Makenna Welsh M.d.  Senior Resident: Dr. Lobo Piña  Intern:  Dr. Royal Lindo  Contact Number: [phone]    CHIEF COMPLAINT ON ADMISSION  Chief Complaint   Patient presents with    Flank Pain     Left side with confirmed kidney stones. Pain since 5/24       Reason for Admission  Flank Pain     Admission Date  6/4/2025    CODE STATUS  Full Code    HPI & HOSPITAL COURSE  This is a 65 y.o. female here with hydronephrosis with urinary obstruction due to ureteral stones, followed by urology.    Per Dr. Quin Clemente's HPI: \"Majo Milner is a 65 y.o. female with PMH significant for ureteral stones presented with increasing left flank plain since 5/24/2025.  Current pain level 2/10 after receiving pain medication.  Reports chills with nausea and vomiting.  Last ate 2 days ago but has been drinking Gatorade daily.  Denies dysuria.  Reports that she was scheduled for stent placement on 6/11/2025.     ERCourse:  On presentation to the ED patient afebrile, /83, HR 96, SpO2 93% in RA with RR of 18.  CBC grossly normal.  CMP notable for reduced GFR at 42  CT renal colic showed 8 mm stone in proximal left ureter with moderate left-sided hydronephrosis, stable from prior exam.  Atherosclerosis and diverticulosis  Received Dilaudid 1 mg, Toradol 15 mg, NS 1 L bolus and Zofran 4 mg  Urology consulted and recommended patient be admitted for stent placement in a.m. with pain management\"    #Hydronephrosis with urinary obstruction due to ureteral calculus  History of ureteral stones followed by [facility].  CT showed 8 mm stone in proximal left ureter with moderate left-sided hydronephrosis, stable from prior exam.  Dr. Krunal Tillman performed left ureteral stent placement on 6/5.  Started on supplemental oxygen and the O2, unclear reasoning as no recorded desaturations.  Weaned to room air morning before discharge.  Patient weaned from IV pain medication, and " discharged home in stable condition on 6/6/2025.    Therefore, she is discharged in good and stable condition to home with close outpatient follow-up.    The patient met 2-midnight criteria for an inpatient stay at the time of discharge.    Discharge Date  6/6/2025    Physical Exam on Day of Discharge  General: No acute distress, resting comfortably in bed.  HEENT: NC/AT. EOMI. MMM  Cardiovascular: RRR, no m/r/g, normal S1/S2  Respiratory: Symmetric inspiratory effort. CTAB with no adventitious sounds  Abdomen: BS+, soft, nondistended, mild tenderness with palpation of the right side of the abdomen, left side of the abdomen causes moderate to severe tenderness.    EXT:  APPIAH, 2+ radial pulses, no lower extremity edema bilaterally  Neuro: Non focal, alert and oriented    FOLLOW UP ITEMS POST DISCHARGE  - Continue PO Toradol, Tylenol for pain  - Complete 5-day course of ciprofloxacin  - Follow-up with urology    DISCHARGE DIAGNOSES  Principal Problem:    Hydronephrosis with urinary obstruction due to ureteral calculus (POA: Yes)  Active Problems:    Hypothyroidism (POA: Yes)    Mood disorder (HCC) (POA: Unknown)  Resolved Problems:    * No resolved hospital problems. *      FOLLOW UP  Future Appointments   Date Time Provider Department Center   6/26/2025  3:30 PM JESSI Singh None   7/28/2025  3:30 PM JESSI Singh None     Lobo Piña M.D.  [address]  [facility] [address]  [phone]    Schedule an appointment as soon as possible for a visit in 1 week(s)        MEDICATIONS ON DISCHARGE     Medication List        START taking these medications        Instructions   acetaminophen 500 MG Tabs  Commonly known as: Tylenol   Take 2 Tablets by mouth every 6 hours as needed for Moderate Pain or Mild Pain.  Dose: 1,000 mg     ciprofloxacin 250 MG Tabs  Commonly known as: Cipro   Take 1 Tablet by mouth every 12 hours.  Dose: 250 mg            CHANGE how you take these medications         Instructions   ketorolac 10 MG Tabs  What changed: reasons to take this  Commonly known as: Toradol   Take 1 Tablet by mouth every 6 hours as needed for Moderate Pain, Severe Pain or Mild Pain (for renal colic/kidney stone pain).  Dose: 10 mg            CONTINUE taking these medications        Instructions   aspirin 81 MG Chew chewable tablet  Commonly known as: Asa   Chew 1 Tablet every day. Indications: Fever, blood thinner  Dose: 81 mg     Cholecalciferol 400 UNIT Tabs  Commonly known as: VITAMIN D   Take 1 Tablet by mouth every morning.  Dose: 400 Units     levothyroxine 137 MCG Tabs  Commonly known as: Synthroid   Take 1 Tablet by mouth every morning on an empty stomach.  Dose: 137 mcg     ondansetron 4 MG Tabs tablet  Commonly known as: Zofran   Take 1 Tablet by mouth 3 times a day as needed.  Dose: 1 Tablet     pantoprazole 40 MG Tbec  Commonly known as: Protonix   Take 1 Tablet by mouth 2 times a day.  Dose: 40 mg     PROBIOTIC DAILY PO   Take 1 Tablet by mouth every day.  Dose: 1 Tablet     sertraline 100 MG Tabs  Commonly known as: Zoloft   TAKE 1 & 1/2 (ONE & ONE-HALF) TABLETS BY MOUTH ONCE DAILY IN THE MORNING FOR  IBS     tamsulosin 0.4 MG capsule  Commonly known as: Flomax   Take 0.4 mg once daily until stone passage occurs or for up to 4 weeks.     VITAMIN B COMPLEX-C PO   Take 1 Tab by mouth every morning.  Dose: 1 Tablet              Allergies  Allergies[1]    DIET  Orders Placed This Encounter   Procedures    Diet Order Diet: Regular     Standing Status:   Standing     Number of Occurrences:   1     Diet::   Regular [1]       ACTIVITY  As tolerated.  Weight bearing as tolerated    CONSULTATIONS  Urology    PROCEDURES  Left ureteral stent placement    LABORATORY  Lab Results   Component Value Date    SODIUM 142 06/06/2025    POTASSIUM 4.3 06/06/2025    CHLORIDE 110 06/06/2025    CO2 18 (L) 06/06/2025    GLUCOSE 94 06/06/2025    BUN 10 06/06/2025    CREATININE 0.73 06/06/2025        Lab Results  "  Component Value Date    WBC 7.3 06/06/2025    HEMOGLOBIN 11.4 (L) 06/06/2025    HEMATOCRIT 35.2 (L) 06/06/2025    PLATELETCT 193 06/06/2025        Total time of the discharge process exceeds 30 minutes.       [1]   Allergies  Allergen Reactions    Bactrim      \"blisters inside of mouth and throat\"    Bee Swelling    Ampicillin Rash     "

## 2025-06-07 ENCOUNTER — RESULTS FOLLOW-UP (OUTPATIENT)
Dept: HOSPITALIST | Facility: MEDICAL CENTER | Age: 65
End: 2025-06-07

## 2025-06-07 LAB
BACTERIA UR CULT: NORMAL
SIGNIFICANT IND 70042: NORMAL
SITE SITE: NORMAL
SOURCE SOURCE: NORMAL

## 2025-06-07 RX ORDER — PROMETHAZINE HYDROCHLORIDE 25 MG/1
25 TABLET ORAL EVERY 6 HOURS PRN
Qty: 30 TABLET | Refills: 0 | Status: SHIPPED | OUTPATIENT
Start: 2025-06-07

## 2025-06-09 ENCOUNTER — PRE-ADMISSION TESTING (OUTPATIENT)
Dept: ADMISSIONS | Facility: MEDICAL CENTER | Age: 65
End: 2025-06-09
Attending: UROLOGY
Payer: MEDICARE

## 2025-06-09 VITALS — HEIGHT: 66 IN | BODY MASS INDEX: 31.99 KG/M2

## 2025-06-09 DIAGNOSIS — Z01.810 PRE-OPERATIVE CARDIOVASCULAR EXAMINATION: Primary | ICD-10-CM

## 2025-06-09 NOTE — PREADMIT AVS NOTE
Current Medications   Medication Instructions    promethazine (PHENERGAN) 25 MG Tab Hold medication day of procedure    acetaminophen (TYLENOL) 500 MG Tab Continue taking medication as prescribed, including morning of procedure     ciprofloxacin (CIPRO) 250 MG Tab Follow instructions from surgeon or specialist.    ketorolac (TORADOL) 10 MG Tab Follow instructions from surgeon or specialist.    ondansetron (ZOFRAN) 4 MG Tab tablet Continue taking medication as prescribed, including morning of procedure     tamsulosin (FLOMAX) 0.4 MG capsule Continue taking medication as prescribed, including morning of procedure     sertraline (ZOLOFT) 100 MG Tab Continue taking medication as prescribed, including morning of procedure     levothyroxine (SYNTHROID) 137 MCG Tab Continue taking medication as prescribed, including morning of procedure     pantoprazole (PROTONIX) 40 MG Tablet Delayed Response Continue taking medication as prescribed, including morning of procedure     Probiotic Product (PROBIOTIC DAILY PO) Stop 7 days before surgery    aspirin (ASA) 81 MG Chew Tab chewable tablet Stop 5 days before surgery    VITAMIN B COMPLEX-C PO Stop 7 days before surgery    Cholecalciferol (VITAMIN D) 400 UNIT Tab Stop 7 days before surgery

## 2025-06-10 ENCOUNTER — TELEPHONE (OUTPATIENT)
Dept: UROLOGY | Facility: MEDICAL CENTER | Age: 65
End: 2025-06-10
Payer: MEDICARE

## 2025-06-10 ENCOUNTER — APPOINTMENT (OUTPATIENT)
Dept: ADMISSIONS | Facility: MEDICAL CENTER | Age: 65
End: 2025-06-10
Attending: UROLOGY
Payer: MEDICARE

## 2025-06-10 DIAGNOSIS — Z01.810 PRE-OPERATIVE CARDIOVASCULAR EXAMINATION: ICD-10-CM

## 2025-06-10 LAB — EKG IMPRESSION: NORMAL

## 2025-06-10 PROCEDURE — 93005 ELECTROCARDIOGRAM TRACING: CPT | Mod: TC

## 2025-06-10 PROCEDURE — 93010 ELECTROCARDIOGRAM REPORT: CPT | Performed by: STUDENT IN AN ORGANIZED HEALTH CARE EDUCATION/TRAINING PROGRAM

## 2025-06-10 NOTE — H&P
Chief Complaint: left renal colic    HPI: Majo Milner is a 65 y.o. female with a history of recurrent nephrolithiasis (>30 stones passed in her life) most recently with severe and acute left renal colic and found on CT to have a 4 mm stone near the left UVJ, as well as an 8 mm stone at the left UPJ. We planned on elective ureteroscopy and laser lithotripsy, but her pain progressed and was severe, and so she underwent urgent stent insertion on 6/5/25 with Dr. Tillman. She is here today for ureteroscopy and management of the stones.     She has had no signs or symptoms of infection during this bout of renal colic.     No fevers or chills.     Past Medical History:  Past Medical History[1]    Past Surgical History:  Past Surgical History[2]    Family History:  No family history on file.    Social History:  Social History     Socioeconomic History    Marital status:      Spouse name: Not on file    Number of children: Not on file    Years of education: Not on file    Highest education level: Some college, no degree   Occupational History    Not on file   Tobacco Use    Smoking status: Never    Smokeless tobacco: Never   Vaping Use    Vaping status: Never Used   Substance and Sexual Activity    Alcohol use: Yes     Comment: 6 PER YEAR     Drug use: No    Sexual activity: Not on file   Other Topics Concern    Not on file   Social History Narrative    Not on file     Social Drivers of Health     Financial Resource Strain: Low Risk  (3/13/2025)    Overall Financial Resource Strain (CARDIA)     Difficulty of Paying Living Expenses: Not hard at all   Food Insecurity: No Food Insecurity (6/5/2025)    Hunger Vital Sign     Worried About Running Out of Food in the Last Year: Never true     Ran Out of Food in the Last Year: Never true   Transportation Needs: No Transportation Needs (6/5/2025)    PRAPARE - Transportation     Lack of Transportation (Medical): No     Lack of Transportation (Non-Medical): No   Physical  Activity: Sufficiently Active (3/13/2025)    Exercise Vital Sign     Days of Exercise per Week: 5 days     Minutes of Exercise per Session: 150+ min   Stress: Stress Concern Present (11/18/2022)    [employer] [place] [employer] - Occupational Stress Questionnaire     Feeling of Stress : To some extent   Social Connections: Unknown (11/18/2022)    Social Connection and Isolation Panel [NHANES]     Frequency of Communication with Friends and Family: Once a week     Frequency of Social Gatherings with Friends and Family: Once a week     Attends Anabaptism Services: Never     Active Member of Clubs or Organizations: Not on file     Attends Club or Organization Meetings: Not on file     Marital Status:    Intimate Partner Violence: Not At Risk (6/5/2025)    Humiliation, Afraid, Rape, and Kick questionnaire     Fear of Current or Ex-Partner: No     Emotionally Abused: No     Physically Abused: No     Sexually Abused: No   Housing Stability: Low Risk  (6/5/2025)    Housing Stability Vital Sign     Unable to Pay for Housing in the Last Year: No     Number of Times Moved in the Last Year: 0     Homeless in the Last Year: No       Medications:  Current Medications[3]    Allergies:  Allergies[4]    Review of Systems:  Constitutional: Negative for fever, chills and malaise/fatigue.   HENT: Negative for congestion.    Eyes: Negative for pain.   Respiratory: Negative for cough and shortness of breath.    Cardiovascular: Negative for leg swelling.   Gastrointestinal: Negative for nausea, vomiting, abdominal pain and diarrhea.   Genitourinary: Negative for dysuria and hematuria.   Skin: Negative for rash.   Neurological: Negative for dizziness, focal weakness and headaches.   Endo/Heme/Allergies: Does not bruise/bleed easily.   Psychiatric/Behavioral: Negative for depression.  The patient is not nervous/anxious.        Physical Exam:  There were no vitals filed for this visit.    GENERAL: well appearing, well  nourished, NAD  RESP: respiratory effort normal  ABDOMEN: soft, nontender, nondistended, no masses or organomegaly  SKIN/LYMPH: normal coloration and turgor, no suspicious skin lesions noted  NEURO/PSYCH: alert, oriented, normal speech, no focal findings or movement disorder noted  EXTREMITIES: no pedal edema noted      Data Review:    Labs:  POCT UA   Lab Results   Component Value Date/Time    POCCOLOR Moca 05/28/2025 10:30 AM    POCAPPEAR Turbid 05/28/2025 10:30 AM    POCLEUKEST Negative 05/28/2025 10:30 AM    POCNITRITE Negative 05/28/2025 10:30 AM    POCUROBILIGE 0.2 05/28/2025 10:30 AM    POCPROTEIN Negative 05/28/2025 10:30 AM    POCURPH 6.0 05/28/2025 10:30 AM    POCBLOOD Large 05/28/2025 10:30 AM    POCSPGRV 1.020 05/28/2025 10:30 AM    POCKETONES Negative 05/28/2025 10:30 AM    POCBILIRUBIN Negative 05/28/2025 10:30 AM    POCGLUCUA Negative 05/28/2025 10:30 AM      CBC   Lab Results   Component Value Date/Time    WBC 7.3 06/06/2025 0053    RBC 3.87 (L) 06/06/2025 0053    HEMOGLOBIN 11.4 (L) 06/06/2025 0053    HEMATOCRIT 35.2 (L) 06/06/2025 0053    MCV 91.0 06/06/2025 0053    MCH 29.5 06/06/2025 0053    MCHC 32.4 06/06/2025 0053    RDW 43.5 06/06/2025 0053    MPV 11.6 06/06/2025 0053    LYMPHOCYTES 15.60 (L) 06/04/2025 1759    LYMPHS 1.14 06/04/2025 1759    MONOCYTES 8.30 06/04/2025 1759    MONOS 0.61 06/04/2025 1759    EOSINOPHILS 2.30 06/04/2025 1759    EOS 0.17 06/04/2025 1759    BASOPHILS 1.10 06/04/2025 1759    BASO 0.08 06/04/2025 1759    NRBC 0.00 06/04/2025 1759       CMP   Lab Results   Component Value Date/Time    SODIUM 142 06/06/2025 0053    POTASSIUM 4.3 06/06/2025 0053    CHLORIDE 110 06/06/2025 0053    CO2 18 (L) 06/06/2025 0053    ANION 14.0 06/06/2025 0053    GLUCOSE 94 06/06/2025 0053    BUN 10 06/06/2025 0053    CREATININE 0.73 06/06/2025 0053    GFRCKD 90 06/06/2025 0053    CALCIUM 8.3 (L) 06/06/2025 0053    CORRCALC 9.4 06/04/2025 1759    ASTSGOT 26 06/04/2025 1759    ALTSGPT 36  06/04/2025 1759    ALKPHOSPHAT 51 06/04/2025 1759    TBILIRUBIN 0.5 06/04/2025 1759    ALBUMIN 3.8 06/04/2025 1759    TOTPROTEIN 6.6 06/04/2025 1759    GLOBULIN 2.8 06/04/2025 1759    AGRATIO 1.4 06/04/2025 1759       Imaging:   CT-RENAL COLIC EVALUATION(A/P W/O)  Order: [id number]   Status: Final result       Next appt: Today at 02:00 PM in Admitting (PREADMIT SM TEST ONLY 1)    Test Result Released: Yes (seen)    0 Result Notes  Details    Reading Physician Reading Date Result Priority   Rick Cain M.D.  [phone] 6/4/2025      Narrative & Impression     6/4/2025 8:29 PM     HISTORY/REASON FOR EXAM:  FLANK PAIN        TECHNIQUE/EXAM DESCRIPTION AND NUMBER OF VIEWS:  CT scan renal/colic without contrast.     5 mm helical images of the abdomen and pelvis were obtained from the diaphragmatic domes through the pubic symphysis.     Low dose optimization technique was utilized for this CT exam including automated exposure control and adjustment of the mA and/or kV according to patient size.     COMPARISON: 5/29/2025     FINDINGS:  Renal stone: There is an 8 mm stone in the proximal left ureter with moderate left-sided hydronephrosis. This was also present on the prior exam.     Lung Bases:There is mild dependent atelectasis at the lung bases. Note is also made of some partially calcified pleural plaque which may be seen with prior asbestos exposure.     Abdomen:     Within the limits of noncontrast technique:     Liver: Normal.     Spleen: Unremarkable.     Pancreas: Unremarkable.     Gallbladder: The gallbladder has been resected.     Biliary: There is mild dilatation of the common bile duct measuring 11 mm.     Adrenal glands: Nonenlarged.     Bowel: There are multiple colonic diverticula. There is no evidence for acute diverticulitis. Small bowel loops are normal in caliber. A normal caliber appendix is identified. There is a small hiatal hernia. Note is made of duodenal diverticulosis.     Lymph nodes: No  adenopathy.     Vasculature: Atherosclerosis without aneurysmal dilatation of the aorta.     Peritoneum: Unremarkable without ascites or free intraperitoneal air.     Musculoskeletal: No aggressive osseous lesion. Degenerative changes are noted.     Pelvis:     There is a stable calcification in the left hemipelvis near the ureterovesical junction. This may represent a stone or phlebolith. Other small pelvic calcifications are consistent with phleboliths.     No lymph node enlargement.     No free fluid or free air is evident in the pelvis.     No aggressive bone lesions are seen.     IMPRESSION:     1.  There is an 8 mm stone in the proximal left ureter with moderate left-sided hydronephrosis. This is stable when compared with the prior exam.  2.  Atherosclerosis.  3.  Diverticulosis.     CT-RENAL COLIC EVALUATION(A/P W/O)  Order: [id number]   Status: Final result       Next appt: Today at 02:00 PM in Admitting (PREADMIT SM TEST ONLY 1)       Dx: Nephrolithiasis    Test Result Released: Yes (seen)    0 Result Notes  Details    Reading Physician Reading Date Result Priority   Mesfin Velez M.D.  [phone] 5/29/2025      Narrative & Impression     5/29/2025 4:29 PM     HISTORY/REASON FOR EXAM:  flank pain, need further evaluation of stone seen on ultrasound     TECHNIQUE/EXAM DESCRIPTION AND NUMBER OF VIEWS:  CT scan renal/colic without contrast.     5 mm helical images of the abdomen and pelvis were obtained from the diaphragmatic domes through the pubic symphysis.     Low dose optimization technique was utilized for this CT exam including automated exposure control and adjustment of the mA and/or kV according to patient size.     COMPARISON: CT abdomen and pelvis 4/17/2020, renal ultrasound 5/28/2025     FINDINGS:  Renal stone: RIGHT kidney is unremarkable.  LEFT kidney shows dilated with consistent renal pelvis.  Stone present in the proximal ureter measuring 7 x 7 x 8 mm (565 HU).  Distal ureter is mildly  prominent.  Stone at the LEFT UVJ measures 5 x 4 x 6 mm   (387 HU).     Lung Bases:  No pulmonary nodules at the lung bases. No pleural or pericardial fluid.     Abdomen:     Within the limits of noncontrast technique:     Liver: Unremarkable.     Spleen: Unremarkable.     Pancreas: Unremarkable.     Gallbladder: Absent.  Calcifications again present within the gallbladder fossa, likely dystrophic..     Biliary: No biliary dilatation..     Adrenal glands: Nonenlarged.     Bowel: No bowel obstruction.  Normal appendix.  Colonic diverticula     Lymph nodes: No adenopathy.     Vasculature: Mild atherosclerotic calcification of abdominal aorta     Peritoneum: No peritoneal fluid or pneumoperitoneum.     Musculoskeletal: Lumbar spine degenerative changes with mild levoconvex curvature..     Pelvis:  Bladder is decompressed.  No dependent fluid.  Uterus is grossly unremarkable.  Ovaries are not well-visualized.     IMPRESSION:     1.  Moderate LEFT hydronephrosis with 2 stones in the proximal and distal ureter measuring up to 8 mm.  2.  No RIGHT hydronephrosis or ureteral stone.  3.  Colonic diverticulosis.           Assessment: 65 y.o. female with a history of recurrent nephrolithiasis and now with an episode of severe left renal colic. She felt temporarily improved after passing what appears to have been a 4 mm stone at the left ureterovesical junction, but pain from an obstructitng 8 mm left UPJ stone persistent and required stent insertion 6 days ago. Here today for definitive management of the stone.     We discussed ureteroscopy and laser lithotripsy in detail, including the approach, expected recovery, potential need for another ureteral stent, and risks including urinary tract infection, urosepsis, residual stones, renal colic from obstructing stone fragments, ureteral injury, and ureteral stricture. She understands and is ready to proceed.     Plan:    Proceed to OR for cystoscopy and left ureteroscopy with laser  lithotripsy and possible stent replacement      Mayco Conley MD         [1]   Past Medical History:  Diagnosis Date    Anesthesia     nausea and vomiting    Arrhythmia     occas ectopic beat    Delayed emergence from general anesthesia     Dental disorder     upper flipper    GERD (gastroesophageal reflux disease)     Hashimoto's disease     Heart burn     Heart murmur     History of MRSA infection 2016    head and armpit     IBS (irritable bowel syndrome)     Indigestion     Kidney calculi     kidney stones    Other specified disorder of intestines     IBS    Pain     Kidney/flank pain    PONV (postoperative nausea and vomiting)     Sleep apnea     no cpap since losing 60 pounds     Snoring    [2]   Past Surgical History:  Procedure Laterality Date    ID CYSTOSCOPY,INSERT URETERAL STENT Left 6/5/2025    Procedure: CYSTOSCOPY, WITH LEFT URETERAL STENT INSERTION;  Surgeon: Krunal Tillman M.D.;  Location: SURGERY [facility];  Service: Urology    CYSTOSCOPY STENT PLACEMENT  5/23/2018    Procedure: CYSTOSCOPY;  Surgeon: Yifan Rolle M.D.;  Location: [facility];  Service: Urology    URETEROSCOPY Right 5/23/2018    Procedure: URETEROSCOPY;  Surgeon: Yifan Rolle M.D.;  Location: [facility];  Service: Urology    LASERTRIPSY Right 5/23/2018    Procedure: LASERTRIPSY- LITHO;  Surgeon: Yifan Rolle M.D.;  Location: SURGERY [facility];  Service: Urology    JIMMIE BY LAPAROSCOPY  7/17/2013    Performed by John Carrasco M.D. at SURGERY [facility]    CYSTOSCOPY STENT PLACEMENT  9/29/2011    Performed by YIFAN ROLLE at SURGERY [facility]    URETEROSCOPY  9/29/2011    Performed by YIFAN ROLLE at [facility]    LASERTRIPSY  9/29/2011    Performed by YIFAN ROLLE at [facility]    LITHOTRIPSY     [3]   No current facility-administered medications for this encounter.     Current Outpatient Medications   Medication Sig Dispense Refill    promethazine  "(PHENERGAN) 25 MG Tab Take 1 Tablet by mouth every 6 hours as needed for Nausea/Vomiting. 30 Tablet 0    acetaminophen (TYLENOL) 500 MG Tab Take 2 Tablets by mouth every 6 hours as needed for Moderate Pain or Mild Pain.      ciprofloxacin (CIPRO) 250 MG Tab Take 1 Tablet by mouth every 12 hours. 8 Tablet 0    ketorolac (TORADOL) 10 MG Tab Take 1 Tablet by mouth every 6 hours as needed for Moderate Pain, Severe Pain or Mild Pain (for renal colic/kidney stone pain). 30 Tablet 0    ondansetron (ZOFRAN) 4 MG Tab tablet Take 1 Tablet by mouth 3 times a day as needed.      tamsulosin (FLOMAX) 0.4 MG capsule Take 0.4 mg once daily until stone passage occurs or for up to 4 weeks. 30 Capsule 0    sertraline (ZOLOFT) 100 MG Tab TAKE 1 & 1/2 (ONE & ONE-HALF) TABLETS BY MOUTH ONCE DAILY IN THE MORNING FOR  IBS (Patient taking differently: Take 150 mg by mouth every day. 150 mg = 1.5 tabs) 100 Tablet 0    levothyroxine (SYNTHROID) 137 MCG Tab Take 1 Tablet by mouth every morning on an empty stomach. 100 Tablet 3    pantoprazole (PROTONIX) 40 MG Tablet Delayed Response Take 1 Tablet by mouth 2 times a day. 200 Tablet 3    Probiotic Product (PROBIOTIC DAILY PO) Take 1 Tablet by mouth every day.      aspirin (ASA) 81 MG Chew Tab chewable tablet Chew 1 Tablet every day. Indications: Fever, blood thinner      VITAMIN B COMPLEX-C PO Take 1 Tab by mouth every morning.      Cholecalciferol (VITAMIN D) 400 UNIT Tab Take 1 Tablet by mouth every morning.     [4]   Allergies  Allergen Reactions    Bactrim      \"blisters inside of mouth and throat\"    Bee Swelling    Ampicillin Rash     "

## 2025-06-11 ENCOUNTER — PHARMACY VISIT (OUTPATIENT)
Dept: PHARMACY | Facility: MEDICAL CENTER | Age: 65
End: 2025-06-11
Payer: COMMERCIAL

## 2025-06-11 ENCOUNTER — APPOINTMENT (OUTPATIENT)
Dept: RADIOLOGY | Facility: MEDICAL CENTER | Age: 65
End: 2025-06-11
Attending: UROLOGY
Payer: MEDICARE

## 2025-06-11 ENCOUNTER — ANESTHESIA EVENT (OUTPATIENT)
Dept: SURGERY | Facility: MEDICAL CENTER | Age: 65
End: 2025-06-11
Payer: MEDICARE

## 2025-06-11 ENCOUNTER — ANESTHESIA (OUTPATIENT)
Dept: SURGERY | Facility: MEDICAL CENTER | Age: 65
End: 2025-06-11
Payer: MEDICARE

## 2025-06-11 ENCOUNTER — HOSPITAL ENCOUNTER (OUTPATIENT)
Facility: MEDICAL CENTER | Age: 65
End: 2025-06-11
Attending: UROLOGY | Admitting: UROLOGY
Payer: MEDICARE

## 2025-06-11 VITALS
HEIGHT: 66 IN | OXYGEN SATURATION: 91 % | WEIGHT: 196.43 LBS | SYSTOLIC BLOOD PRESSURE: 150 MMHG | HEART RATE: 69 BPM | RESPIRATION RATE: 16 BRPM | DIASTOLIC BLOOD PRESSURE: 65 MMHG | TEMPERATURE: 98.4 F | BODY MASS INDEX: 31.57 KG/M2

## 2025-06-11 DIAGNOSIS — N20.0 NEPHROLITHIASIS: Primary | ICD-10-CM

## 2025-06-11 LAB — PATHOLOGY CONSULT NOTE: NORMAL

## 2025-06-11 PROCEDURE — C1769 GUIDE WIRE: HCPCS | Performed by: UROLOGY

## 2025-06-11 PROCEDURE — 160025 RECOVERY II MINUTES (STATS): Performed by: UROLOGY

## 2025-06-11 PROCEDURE — 82365 CALCULUS SPECTROSCOPY: CPT

## 2025-06-11 PROCEDURE — 700101 HCHG RX REV CODE 250: Performed by: UROLOGY

## 2025-06-11 PROCEDURE — 700101 HCHG RX REV CODE 250: Performed by: ANESTHESIOLOGY

## 2025-06-11 PROCEDURE — 160046 HCHG PACU - 1ST 60 MINS PHASE II: Performed by: UROLOGY

## 2025-06-11 PROCEDURE — 160015 HCHG STAT PREOP MINUTES: Performed by: UROLOGY

## 2025-06-11 PROCEDURE — C2617 STENT, NON-COR, TEM W/O DEL: HCPCS | Performed by: UROLOGY

## 2025-06-11 PROCEDURE — 88300 SURGICAL PATH GROSS: CPT | Performed by: PATHOLOGY

## 2025-06-11 PROCEDURE — C1747 HCHG SHELL REV 278 C1747: HCPCS | Performed by: UROLOGY

## 2025-06-11 PROCEDURE — 160002 HCHG RECOVERY MINUTES (STAT): Performed by: UROLOGY

## 2025-06-11 PROCEDURE — 160193 HCHG PACU STANDARD - 1ST 60 MINS: Performed by: UROLOGY

## 2025-06-11 PROCEDURE — 88300 SURGICAL PATH GROSS: CPT | Mod: 26 | Performed by: PATHOLOGY

## 2025-06-11 PROCEDURE — 160048 HCHG OR STATISTICAL LEVEL 1-5: Performed by: UROLOGY

## 2025-06-11 PROCEDURE — 160028 HCHG SURGERY MINUTES - 1ST 30 MINS LEVEL 3: Performed by: UROLOGY

## 2025-06-11 PROCEDURE — 700117 HCHG RX CONTRAST REV CODE 255: Performed by: UROLOGY

## 2025-06-11 PROCEDURE — 160191 HCHG ANESTHESIA STANDARD: Performed by: UROLOGY

## 2025-06-11 PROCEDURE — 110371 HCHG SHELL REV 272: Performed by: UROLOGY

## 2025-06-11 PROCEDURE — 700111 HCHG RX REV CODE 636 W/ 250 OVERRIDE (IP): Performed by: ANESTHESIOLOGY

## 2025-06-11 PROCEDURE — 74018 RADEX ABDOMEN 1 VIEW: CPT

## 2025-06-11 PROCEDURE — 52356 CYSTO/URETERO W/LITHOTRIPSY: CPT | Mod: LT | Performed by: UROLOGY

## 2025-06-11 PROCEDURE — RXMED WILLOW AMBULATORY MEDICATION CHARGE: Performed by: UROLOGY

## 2025-06-11 PROCEDURE — 160039 HCHG SURGERY MINUTES - EA ADDL 1 MIN LEVEL 3: Performed by: UROLOGY

## 2025-06-11 PROCEDURE — 700105 HCHG RX REV CODE 258: Performed by: UROLOGY

## 2025-06-11 DEVICE — STENT UROLOGICAL POLARIS 6X24 ULTRA: Type: IMPLANTABLE DEVICE | Site: URETER | Status: FUNCTIONAL

## 2025-06-11 RX ORDER — HYDROMORPHONE HYDROCHLORIDE 1 MG/ML
0.2 INJECTION, SOLUTION INTRAMUSCULAR; INTRAVENOUS; SUBCUTANEOUS
Status: DISCONTINUED | OUTPATIENT
Start: 2025-06-11 | End: 2025-06-11 | Stop reason: HOSPADM

## 2025-06-11 RX ORDER — ALBUTEROL SULFATE 5 MG/ML
2.5 SOLUTION RESPIRATORY (INHALATION)
Status: DISCONTINUED | OUTPATIENT
Start: 2025-06-11 | End: 2025-06-11 | Stop reason: HOSPADM

## 2025-06-11 RX ORDER — OXYBUTYNIN CHLORIDE 5 MG/1
5 TABLET ORAL 3 TIMES DAILY PRN
Qty: 20 TABLET | Refills: 0 | Status: SHIPPED | OUTPATIENT
Start: 2025-06-11

## 2025-06-11 RX ORDER — SODIUM CHLORIDE, SODIUM LACTATE, POTASSIUM CHLORIDE, CALCIUM CHLORIDE 600; 310; 30; 20 MG/100ML; MG/100ML; MG/100ML; MG/100ML
INJECTION, SOLUTION INTRAVENOUS CONTINUOUS
Status: DISCONTINUED | OUTPATIENT
Start: 2025-06-11 | End: 2025-06-11 | Stop reason: HOSPADM

## 2025-06-11 RX ORDER — LIDOCAINE HYDROCHLORIDE 20 MG/ML
INJECTION, SOLUTION EPIDURAL; INFILTRATION; INTRACAUDAL; PERINEURAL PRN
Status: DISCONTINUED | OUTPATIENT
Start: 2025-06-11 | End: 2025-06-11 | Stop reason: SURG

## 2025-06-11 RX ORDER — HYDRALAZINE HYDROCHLORIDE 20 MG/ML
5 INJECTION INTRAMUSCULAR; INTRAVENOUS
Status: DISCONTINUED | OUTPATIENT
Start: 2025-06-11 | End: 2025-06-11 | Stop reason: HOSPADM

## 2025-06-11 RX ORDER — HYDROMORPHONE HYDROCHLORIDE 1 MG/ML
0.1 INJECTION, SOLUTION INTRAMUSCULAR; INTRAVENOUS; SUBCUTANEOUS
Status: DISCONTINUED | OUTPATIENT
Start: 2025-06-11 | End: 2025-06-11 | Stop reason: HOSPADM

## 2025-06-11 RX ORDER — ONDANSETRON 2 MG/ML
4 INJECTION INTRAMUSCULAR; INTRAVENOUS
Status: DISCONTINUED | OUTPATIENT
Start: 2025-06-11 | End: 2025-06-11 | Stop reason: HOSPADM

## 2025-06-11 RX ORDER — OXYCODONE HCL 5 MG/5 ML
10 SOLUTION, ORAL ORAL
Status: DISCONTINUED | OUTPATIENT
Start: 2025-06-11 | End: 2025-06-11 | Stop reason: HOSPADM

## 2025-06-11 RX ORDER — DEXAMETHASONE SODIUM PHOSPHATE 4 MG/ML
INJECTION, SOLUTION INTRA-ARTICULAR; INTRALESIONAL; INTRAMUSCULAR; INTRAVENOUS; SOFT TISSUE PRN
Status: DISCONTINUED | OUTPATIENT
Start: 2025-06-11 | End: 2025-06-11 | Stop reason: SURG

## 2025-06-11 RX ORDER — CIPROFLOXACIN 500 MG/1
500 TABLET, FILM COATED ORAL 2 TIMES DAILY
Qty: 2 TABLET | Refills: 0 | Status: SHIPPED | OUTPATIENT
Start: 2025-06-11 | End: 2025-06-12

## 2025-06-11 RX ORDER — PHENAZOPYRIDINE HYDROCHLORIDE 200 MG/1
200 TABLET, FILM COATED ORAL 3 TIMES DAILY PRN
Qty: 10 TABLET | Refills: 0 | Status: SHIPPED | OUTPATIENT
Start: 2025-06-11

## 2025-06-11 RX ORDER — LABETALOL HYDROCHLORIDE 5 MG/ML
5 INJECTION, SOLUTION INTRAVENOUS
Status: DISCONTINUED | OUTPATIENT
Start: 2025-06-11 | End: 2025-06-11 | Stop reason: HOSPADM

## 2025-06-11 RX ORDER — CEFAZOLIN SODIUM 1 G/3ML
INJECTION, POWDER, FOR SOLUTION INTRAMUSCULAR; INTRAVENOUS PRN
Status: DISCONTINUED | OUTPATIENT
Start: 2025-06-11 | End: 2025-06-11 | Stop reason: SURG

## 2025-06-11 RX ORDER — HALOPERIDOL 5 MG/ML
1 INJECTION INTRAMUSCULAR
Status: DISCONTINUED | OUTPATIENT
Start: 2025-06-11 | End: 2025-06-11 | Stop reason: HOSPADM

## 2025-06-11 RX ORDER — OXYCODONE HCL 5 MG/5 ML
5 SOLUTION, ORAL ORAL
Status: DISCONTINUED | OUTPATIENT
Start: 2025-06-11 | End: 2025-06-11 | Stop reason: HOSPADM

## 2025-06-11 RX ORDER — HYDROMORPHONE HYDROCHLORIDE 1 MG/ML
0.4 INJECTION, SOLUTION INTRAMUSCULAR; INTRAVENOUS; SUBCUTANEOUS
Status: DISCONTINUED | OUTPATIENT
Start: 2025-06-11 | End: 2025-06-11 | Stop reason: HOSPADM

## 2025-06-11 RX ORDER — ONDANSETRON 2 MG/ML
INJECTION INTRAMUSCULAR; INTRAVENOUS PRN
Status: DISCONTINUED | OUTPATIENT
Start: 2025-06-11 | End: 2025-06-11 | Stop reason: SURG

## 2025-06-11 RX ORDER — EPHEDRINE SULFATE 50 MG/ML
INJECTION, SOLUTION INTRAVENOUS PRN
Status: DISCONTINUED | OUTPATIENT
Start: 2025-06-11 | End: 2025-06-11 | Stop reason: SURG

## 2025-06-11 RX ORDER — EPHEDRINE SULFATE 50 MG/ML
5 INJECTION, SOLUTION INTRAVENOUS
Status: DISCONTINUED | OUTPATIENT
Start: 2025-06-11 | End: 2025-06-11 | Stop reason: HOSPADM

## 2025-06-11 RX ORDER — DIPHENHYDRAMINE HYDROCHLORIDE 50 MG/ML
12.5 INJECTION, SOLUTION INTRAMUSCULAR; INTRAVENOUS
Status: DISCONTINUED | OUTPATIENT
Start: 2025-06-11 | End: 2025-06-11 | Stop reason: HOSPADM

## 2025-06-11 RX ADMIN — PROPOFOL 150 MG: 10 INJECTION, EMULSION INTRAVENOUS at 09:17

## 2025-06-11 RX ADMIN — EPHEDRINE SULFATE 10 MG: 50 INJECTION, SOLUTION INTRAVENOUS at 09:28

## 2025-06-11 RX ADMIN — DEXAMETHASONE SODIUM PHOSPHATE 4 MG: 4 INJECTION INTRA-ARTICULAR; INTRALESIONAL; INTRAMUSCULAR; INTRAVENOUS; SOFT TISSUE at 09:18

## 2025-06-11 RX ADMIN — SODIUM CHLORIDE, POTASSIUM CHLORIDE, SODIUM LACTATE AND CALCIUM CHLORIDE: 600; 310; 30; 20 INJECTION, SOLUTION INTRAVENOUS at 08:39

## 2025-06-11 RX ADMIN — LIDOCAINE HYDROCHLORIDE 50 MG: 20 INJECTION, SOLUTION EPIDURAL; INFILTRATION; INTRACAUDAL; PERINEURAL at 09:17

## 2025-06-11 RX ADMIN — FENTANYL CITRATE 50 MCG: 50 INJECTION, SOLUTION INTRAMUSCULAR; INTRAVENOUS at 09:56

## 2025-06-11 RX ADMIN — CEFAZOLIN 2 G: 1 INJECTION, POWDER, FOR SOLUTION INTRAMUSCULAR; INTRAVENOUS at 09:10

## 2025-06-11 RX ADMIN — ONDANSETRON 4 MG: 2 INJECTION INTRAMUSCULAR; INTRAVENOUS at 09:18

## 2025-06-11 ASSESSMENT — FIBROSIS 4 INDEX: FIB4 SCORE: 1.48

## 2025-06-11 ASSESSMENT — PAIN SCALES - GENERAL: PAIN_LEVEL: 0

## 2025-06-11 ASSESSMENT — PAIN DESCRIPTION - PAIN TYPE: TYPE: SURGICAL PAIN

## 2025-06-11 NOTE — OR SURGEON
Immediate Post OP Note    PreOp Diagnosis: Left UPJ stone      PostOp Diagnosis: Left UPJ stone, small distal left ureteral stone      Procedure(s):  LEFT URETEROSCOPY, LASER LITHOTRIPSY AND STENT INSERTION - Wound Class: None  LITHOTRIPSY, USING LASER - Wound Class: None  STENT PLACEMENT - Wound Class: None    Surgeon(s):  Mayco Conley M.D.    Anesthesiologist/Type of Anesthesia:  Anesthesiologist: Endy Adrian M.D./General    Surgical Staff:  Circulator: Santo Lawson R.N.  Operating Room Assistant (ORA): Yin De Leon  Scrub Person: Nadiya Ta  Radiology Technologist: Félix Melendez    Specimens removed if any:  ID Type Source Tests Collected by Time Destination   1 : Left renal stone fragments Stone Kidney MISCELLANEOUS TEST Mayco Conley M.D. 6/11/2025  9:52 AM        Estimated Blood Loss: 0    Findings: Large but very soft left UPJ stone; 2 mm left UVJ stone    Complications: None        6/11/2025 10:02 AM Mayco Conley M.D.

## 2025-06-11 NOTE — OP REPORT
Operative Report    Patient: Majo Milner    MRN: [id number]    Operation: Cystoscopy and left ureteroscopy with laser lithotripsy and stent insertion    Anesthesia: General    EBL: 0 mL    Surgeon: Mayco Conley MD    Assistant(s): n/a    Preoperative diagnosis: Left UPJ stone    Postoperative diagnosis: Left UPJ stone and small left UVJ stone    Specimen: Left renal stone fragments    Operative indications: Majo Milner is a 65 y.o. female with a history of recurrent nephrolithiasis (over 30 episodes of passing stones) here for management of an obstructive left UPJ stone. She had severe pain and had a stent inserted last week, and feels well today.    Operative details: The patient was brought to the operating room and placed on the operating table in supine position. After administration of anesthesia, she was placed in the dorsal lithotomy position with all pressure points adequately padded, and then prepped and draped in usual sterile fashion. A surgical time-out was performed to confirm patient identity, diagnosis, operative plan including laterality, availability of all required equipment, and administration of proper antibiotic prophylaxis.     After the time-out, the case began by inserting a 22 [place] rigid cystoscope and carefully inspecting the urethra and the entirety of the bladder. Inspection revealed a normal urethra and bladder.     The existing left ureteral stent was grasped with a flexible grasper tool and withdrawn along with the cystoscope to the urethral meatus. A Sensor wire was advanced through the stent and up to the left renal pelvis as seen with fluoroscopy    A dual lumen catheter was then advanced over the wire into the distal ureter, and diluted contrast solution was used to perform a retrograde pyelogram. This demonstrated a normal collecting system without hydronephrosis, but with a filling defect at the UPJ.    Next, a second Sensor wire was advanced through the dual  lumen catheter and up to the renal pelvis. An 11/13 Fr x 28 cm ureteral access sheath was then advanced over one of these wires and up to the proximal ureter. The inner obturator of the access sheath was removed along with one of the wires.     A flexible ureteroscope was then advanced through the sheath and up to the proximal ureter. Ureteroscopy and pyeloscopy were performed, and using the prior retrograde pyelogram as a guide all calyces of the collecting system were inspected. Examination revealed a stone at the left UPJ, with a diameter just under 1 cm. This was deflected without difficulty into the left renal pelvis. The remainder of the collecting system was normal, with no other stones.    Lithotripsy was then performed. A 200 nm fiber for the [employer] holmium laser was passed through the ureteroscope, and settings of 0.3 J and 80 Hz were used to dust the stone. The stone was quite soft, and at first this actually resulted in fracturing the stone into about 5 pieces. Most of these were deflected into a mid pole calyx where dusting was done more efficiently. This was performed until only dust and small sub-millimeter fragments remained. The remaining fragments from the initial fragmentation were extracted with a zero-tip basket and sent for stone analysis.     The kidney was then closely inspected again to ensure no significant stone fragments remained in any of the calyces or the renal pelvis. The ureteroscope was then withdrawn down the ureter in tandem with the ureteral access sheath. There was one small 2-3 mm stone at the left UVJ, and this was secured with the basket and extracted. The ureter was otherwise free of stones, and there was no evidence of ureteral injury.     Once the sheath and ureteroscope were removed entirely, the remaining Sensor wire was backloaded through the cystoscope, which was then replaced in the bladder. A 6 Fr x 24 cm double J ureteral stent was advanced over the wire via  Seldinger technique, and once the wire was removed there was a good curl of the stent in the left renal pelvis, and a normal curl observed in the bladder.     Before removing the cystoscope the bladder was emptied. The string of the ureteral stent was secured to the left thigh. The patient was cleaned and dried and placed back into the supine position, and then woken from anesthesia.  The patient was then transferred to the recovery room in stable position.      Mayco Conley MD

## 2025-06-11 NOTE — ANESTHESIA TIME REPORT
Anesthesia Start and Stop Event Times       Date Time Event    6/11/2025 0848 Ready for Procedure     0910 Anesthesia Start     1008 Anesthesia Stop          Responsible Staff  06/11/25      Name Role Begin End    Endy Adrian M.D. Anesth 0910 1008          Overtime Reason:  no overtime (within assigned shift)    Comments:

## 2025-06-11 NOTE — DISCHARGE INSTR - OTHER INFO
"-Stay very well hydrated for the next week to help pass the \"dust\" from the prior stone.  -You can take the as-needed prescriptions for phenazopyridine and oxybutynin for any bladder pain related to your stent.  -If you do take phenazopyridine, it will turn your urine an orange-red color; this is normal and not concerning.  -You can remove your stent on Monday June 16th.  a shower or bathtub, release the bandages holding the string, and slowly but steadily pull the string while exhaling. This will remove the entire stent, which you can discard.  -Take the prescribed antibiotic on the day of stent removal (both morning and evening). Be sure to wait about an hour after taking the antibiotic before pulling your stent.   -If after stent removal you have any significant flank pain with fevers or chills, please call the office at [phone], or go to the emergency room if after hours.  -Call our office ([phone]) or send a [employer] message to Dr. Conley with any questions or concerns prior to your follow up appointment.     "

## 2025-06-11 NOTE — ANESTHESIA PREPROCEDURE EVALUATION
Case: [id number] Date/Time: 06/11/25 1000    Procedures:       LEFT URETEROSCOPY, LASER LITHOTRIPSY AND STENT INSERTION      LITHOTRIPSY, USING LASER      STENT PLACEMENT    Pre-op diagnosis: LEFT RENAL STONE    Location: [facility] OR 02 / SURGERY [facility]    Surgeons: Mayco Conley M.D.            Relevant Problems   ANESTHESIA   (positive) Obstructive sleep apnea syndrome in adult      PULMONARY (within normal limits)      NEURO (within normal limits)      CARDIAC (within normal limits)      GI   (positive) Gastroesophageal reflux disease         (positive) Hydronephrosis with urinary obstruction due to ureteral calculus      ENDO   (positive) Hypothyroidism   MINDI resolved with weight loss    Physical Exam    Airway   Mallampati: II  TM distance: >3 FB  Neck ROM: full       Cardiovascular - normal exam  Rhythm: regular  Rate: normal    (-) murmur     Dental - normal exam    (+) upper dentures           Pulmonary - normal examBreath sounds clear to auscultation     Abdominal    Neurological - normal exam                   Anesthesia Plan    ASA 2       Plan - general       Airway plan will be LMA          Induction: intravenous    Postoperative Plan: Postoperative administration of opioids is intended.    Pertinent diagnostic labs and testing reviewed    Informed Consent:    Anesthetic plan and risks discussed with patient.    Use of blood products discussed with: patient whom consented to blood products.

## 2025-06-11 NOTE — ANESTHESIA PROCEDURE NOTES
Airway    Date/Time: 6/11/2025 9:17 AM    Performed by: Endy Adrian M.D.  Authorized by: Endy Adrian M.D.    Location:  OR  Urgency:  Elective  Indications for Airway Management:  Anesthesia      Spontaneous Ventilation: absent    Sedation Level:  Deep  Preoxygenated: Yes    Mask Difficulty Assessment:  0 - not attempted  Final Airway Type:  Supraglottic airway  Final Supraglottic Airway:  Standard LMA    SGA Size:  4  Number of Attempts at Approach:  1

## 2025-06-11 NOTE — DISCHARGE INSTRUCTIONS
What to Expect Post Anesthesia    Rest and take it easy for the first 24 hours.  A responsible adult is recommended to remain with you during that time.  It is normal to feel sleepy.  We encourage you to not do anything that requires balance, judgment or coordination.    FOR 24 HOURS DO NOT:  Drive, operate machinery or run household appliances.  Drink beer or alcoholic beverages.  Make important decisions or sign legal documents.    To avoid nausea, slowly advance diet as tolerated, avoiding spicy or greasy foods for the first day.  Add more substantial food to your diet according to your provider's instructions.  Babies can be fed formula or breast milk as soon as they are hungry.  INCREASE FLUIDS AND FIBER TO AVOID CONSTIPATION.    MILD FLU-LIKE SYMPTOMS ARE NORMAL.  YOU MAY EXPERIENCE GENERALIZED MUSCLE ACHES, THROAT IRRITATION, HEADACHE AND/OR SOME NAUSEA.    If any questions arise, call your provider.  If your provider is not available, please feel free to call the [facility] at [phone].    MEDICATIONS: Resume taking daily medication.  Take prescribed pain medication with food.  If no medication is prescribed, you may take non-aspirin pain medication if needed.  PAIN MEDICATION CAN BE VERY CONSTIPATING.  Take a stool softener or laxative such as senokot, pericolace, or milk of magnesia if needed.    No pain medication given in PACU.     Diet  Resume pre-operative diet upon discharge from the hospital. Depending on how you are feeling and whether you have nausea or not, you may wish to stay with a bland diet for the first few days. However, you can advance your diet as quickly as you feel ready.

## 2025-06-11 NOTE — OR NURSING
1006: Patient arrived from OR via gurney.  Pt opening eyes, OPA removed.   Sedation/Resp Status: Eye opening to verbal.  Respirations spontaneous and non-labored.  Urethral strings attached to L thigh and visible.     1015: Dr. Conley at bedside talking to pt. Pt appears comfortable, vs wnl.    1030: Pt denies pain and nausea and continues to sleep.     1045: Pt family updated of pt status. No change. Pt sipping on juice and tolerating well. Meds to bed to deliver rx bedside.     1054: Pt meets criteria for stage 2. Report to PILAR Arguello.

## 2025-06-11 NOTE — ANESTHESIA POSTPROCEDURE EVALUATION
Patient: Majo Milner    Procedure Summary       Date: 06/11/25 Room / Location:  OR  / SURGERY [facility]    Anesthesia Start: 0910 Anesthesia Stop: 1008    Procedures:       LEFT URETEROSCOPY, LASER LITHOTRIPSY AND STENT INSERTION (Left: Ureter)      LITHOTRIPSY, USING LASER (Left: Ureter)      STENT PLACEMENT (Left: Ureter) Diagnosis: (Left UPJ stone and small left UVJ stone)    Surgeons: Mayco Conley M.D. Responsible Provider: Endy Adrian M.D.    Anesthesia Type: general ASA Status: 2            Final Anesthesia Type: general  Last vitals  BP   Blood Pressure : (!) 150/65    Temp   36.9 °C (98.4 °F)    Pulse   69   Resp   16    SpO2   91 %      Anesthesia Post Evaluation    Patient location during evaluation: PACU  Patient participation: complete - patient participated  Level of consciousness: awake and alert  Pain score: 0    Airway patency: patent  Anesthetic complications: no  Cardiovascular status: hemodynamically stable  Respiratory status: acceptable  Hydration status: euvolemic    PONV: none          There were no known notable events for this encounter.     Nurse Pain Score: 0 (NPRS)

## 2025-06-11 NOTE — OR NURSING
1058: handoff report received from PILAR Yarbrough.    1101: pt in phase 2 recovery    1105: pt wife at bedside.    1130: pt discharged. IV d/c'd. Pt discharged to responsible adult via w/c

## 2025-06-15 LAB
APPEARANCE STONE: NORMAL
COMPN STONE: NORMAL
SPECIMEN WT: 53 MG

## 2025-07-02 DIAGNOSIS — K58.8 OTHER IRRITABLE BOWEL SYNDROME: ICD-10-CM

## 2025-07-02 NOTE — TELEPHONE ENCOUNTER
Received request via: Pharmacy    Was the patient seen in the last year in this department? Yes    Does the patient have an active prescription (recently filled or refills available) for medication(s) requested? No    Pharmacy Name: [facility] [address] [place] [address], [place] [id number] [facility]     Does the patient have [facility] and need 100-day supply? (This applies to ALL medications) Yes, quantity updated to 100 days

## 2025-07-14 RX ORDER — SERTRALINE HYDROCHLORIDE 100 MG/1
TABLET, FILM COATED ORAL
Qty: 100 TABLET | Refills: 0 | Status: SHIPPED | OUTPATIENT
Start: 2025-07-14

## 2025-07-17 ENCOUNTER — TELEPHONE (OUTPATIENT)
Dept: UROLOGY | Facility: MEDICAL CENTER | Age: 65
End: 2025-07-17
Payer: MEDICARE

## 2025-07-22 ENCOUNTER — OFFICE VISIT (OUTPATIENT)
Dept: MEDICAL GROUP | Facility: CLINIC | Age: 65
End: 2025-07-22
Payer: MEDICARE

## 2025-07-22 VITALS
TEMPERATURE: 97.6 F | BODY MASS INDEX: 31.48 KG/M2 | SYSTOLIC BLOOD PRESSURE: 154 MMHG | WEIGHT: 195.9 LBS | DIASTOLIC BLOOD PRESSURE: 77 MMHG | HEART RATE: 69 BPM | HEIGHT: 66 IN | OXYGEN SATURATION: 93 %

## 2025-07-22 DIAGNOSIS — Z23 VACCINE FOR STREPTOCOCCUS PNEUMONIAE AND INFLUENZA: ICD-10-CM

## 2025-07-22 DIAGNOSIS — Z13.1 DIABETES MELLITUS SCREENING: ICD-10-CM

## 2025-07-22 DIAGNOSIS — Z13.220 LIPID SCREENING: ICD-10-CM

## 2025-07-22 DIAGNOSIS — N13.2 HYDRONEPHROSIS WITH URINARY OBSTRUCTION DUE TO URETERAL CALCULUS: Primary | ICD-10-CM

## 2025-07-22 DIAGNOSIS — Z12.31 ENCOUNTER FOR SCREENING MAMMOGRAM FOR MALIGNANT NEOPLASM OF BREAST: ICD-10-CM

## 2025-07-22 PROCEDURE — 3078F DIAST BP <80 MM HG: CPT

## 2025-07-22 PROCEDURE — 90677 PCV20 VACCINE IM: CPT | Performed by: FAMILY MEDICINE

## 2025-07-22 PROCEDURE — G0009 ADMIN PNEUMOCOCCAL VACCINE: HCPCS | Performed by: FAMILY MEDICINE

## 2025-07-22 PROCEDURE — 3077F SYST BP >= 140 MM HG: CPT

## 2025-07-22 PROCEDURE — 99213 OFFICE O/P EST LOW 20 MIN: CPT | Mod: 25,GE

## 2025-07-22 ASSESSMENT — FIBROSIS 4 INDEX: FIB4 SCORE: 1.48

## 2025-07-22 NOTE — ASSESSMENT & PLAN NOTE
Longstanding history of kidney stones, recent lithotripsy.  Patient has recovered well and still following with urology now at [facility].  No dysuria, no blood in the urine appreciated, no flank pain, no fevers or chills.  She has not taken Flomax at the direction of urology but will verify why.  At home she has Flomax, oral Toradol, and is well aware of the signs and symptoms of kidney stones and the need for medical attention regarding these.

## 2025-07-22 NOTE — PROGRESS NOTES
"Subjective:     CC:   Chief Complaint   Patient presents with    [facility] Follow-up     Kidney stones        HPI:   Majo is a 65 y.o. female presents today with:    -Here after lithotripsy. Has tried many things to decrease kidney stones and followed by urology. Feels like she is doing well but it took about 3 weeks before she started to feel better. Brain fog is gone. Feeling stronger every day. Urinating may be incomplete. No dysuria, no blood in the urine that is visible, no flank pain. Urine is clear-yellow and she is staying hydrated. Urology wants her to dc flomax for now, see's them on 8/7/25. Does not take calcium with her vitamin D which she avoids calcium.     No problems updated.    Current Medications and Prescriptions Ordered in Epic[1]    ROS:  Negative except for above in HPI    Objective:     Exam:  BP (!) 154/77 (BP Location: Right arm, Patient Position: Sitting, BP Cuff Size: Adult)   Pulse 69   Temp 36.4 °C (97.6 °F) (Temporal)   Ht 1.676 m (5' 6\")   Wt 88.9 kg (195 lb 14.4 oz)   LMP 04/15/2013   SpO2 93%   BMI 31.62 kg/m²  Body mass index is 31.62 kg/m².    Gen: Alert and oriented, No apparent distress.  HEENT: NCAT, MMM, No lymphadenopathy  Lungs: Normal effort, CTA bilaterally, no wheezes, rhonchi, or rales  CV: Regular rate and rhythm. Stable systolic murmur, no rubs, or gallops. Radial pulses palpable bilat  Abd: Soft, non-distended, no guarding, no rebound, non-tender to palpation  Ext: No clubbing, cyanosis, edema.  Neuro: Non-focal    Labs: No new labs    Assessment & Plan:     65 y.o. female with the following -     Assessment & Plan  Hydronephrosis with urinary obstruction due to ureteral calculus  Longstanding history of kidney stones, recent lithotripsy.  Patient has recovered well and still following with urology now at [facility].  No dysuria, no blood in the urine appreciated, no flank pain, no fevers or chills.  She has not taken Flomax " at the direction of urology but will verify why.  At home she has Flomax, oral Toradol, and is well aware of the signs and symptoms of kidney stones and the need for medical attention regarding these.       Encounter for screening mammogram for malignant neoplasm of breast  Mammogram delayed by recent kidney stone, plans to get this done soon.       Vaccine for streptococcus pneumoniae and influenza  Pneumonia vaccine provided today.  Not in season for influenza  Orders:    Pneumococcal Conjugate Vaccine 20-Valent (6 wks+)    Lipid screening  Patient needs an updated lipid panel, previous lipid panel was from 2022 and showed elevated LDL, triglycerides, and cholesterol, HDL was low.  Patient will likely be recommended for statin which I discussed with the patient and she will consider taking a statin.  Orders:    Lipid Profile; Future    Diabetes mellitus screening  Patient needs A1c screening.  Orders:    HEMOGLOBIN A1C; Future      Problem List Items Addressed This Visit    None      No follow-ups on file.      Lobo Piña MD  Resident - [id number]  [facility] Medicine       [1]   Current Outpatient Medications Ordered in Epic   Medication Sig Dispense Refill    sertraline (ZOLOFT) 100 MG Tab TAKE 1 & 1/2 (ONE & ONE-HALF) TABLETS BY MOUTH ONCE DAILY IN THE MORNING FOR   Tablet 0    promethazine (PHENERGAN) 25 MG Tab Take 1 Tablet by mouth every 6 hours as needed for Nausea/Vomiting. 30 Tablet 0    acetaminophen (TYLENOL) 500 MG Tab Take 2 Tablets by mouth every 6 hours as needed for Moderate Pain or Mild Pain.      ketorolac (TORADOL) 10 MG Tab Take 1 Tablet by mouth every 6 hours as needed for Moderate Pain, Severe Pain or Mild Pain (for renal colic/kidney stone pain). 30 Tablet 0    tamsulosin (FLOMAX) 0.4 MG capsule Take 0.4 mg once daily until stone passage occurs or for up to 4 weeks. 30 Capsule 0    levothyroxine (SYNTHROID) 137 MCG Tab Take 1 Tablet by mouth every morning on an  empty stomach. 100 Tablet 3    aspirin (ASA) 81 MG Chew Tab chewable tablet Chew 1 Tablet every day. Indications: Fever, blood thinner      VITAMIN B COMPLEX-C PO Take 1 Tab by mouth every morning.      Cholecalciferol (VITAMIN D) 400 UNIT Tab Take 1 Tablet by mouth every morning.       No current [facility]-ordered facility-administered medications on file.

## 2025-08-07 ENCOUNTER — APPOINTMENT (OUTPATIENT)
Dept: UROLOGY | Facility: MEDICAL CENTER | Age: 65
End: 2025-08-07
Payer: MEDICARE

## 2025-08-23 ENCOUNTER — HOSPITAL ENCOUNTER (OUTPATIENT)
Facility: MEDICAL CENTER | Age: 65
End: 2025-08-23
Attending: UROLOGY
Payer: MEDICARE

## 2025-08-23 DIAGNOSIS — N20.0 NEPHROLITHIASIS: ICD-10-CM

## 2025-08-23 PROCEDURE — 84560 ASSAY OF URINE/URIC ACID: CPT

## 2025-08-23 PROCEDURE — 83945 ASSAY OF OXALATE: CPT

## 2025-08-23 PROCEDURE — 84300 ASSAY OF URINE SODIUM: CPT

## 2025-08-23 PROCEDURE — 84105 ASSAY OF URINE PHOSPHORUS: CPT

## 2025-08-23 PROCEDURE — 83735 ASSAY OF MAGNESIUM: CPT

## 2025-08-23 PROCEDURE — 82436 ASSAY OF URINE CHLORIDE: CPT

## 2025-08-23 PROCEDURE — 82507 ASSAY OF CITRATE: CPT

## 2025-08-23 PROCEDURE — 82340 ASSAY OF CALCIUM IN URINE: CPT

## 2025-08-23 PROCEDURE — 84133 ASSAY OF URINE POTASSIUM: CPT

## 2025-08-23 PROCEDURE — 82570 ASSAY OF URINE CREATININE: CPT

## 2025-08-23 PROCEDURE — 83986 ASSAY PH BODY FLUID NOS: CPT

## 2025-08-26 ENCOUNTER — HOSPITAL ENCOUNTER (OUTPATIENT)
Dept: RADIOLOGY | Facility: MEDICAL CENTER | Age: 65
End: 2025-08-26
Attending: UROLOGY
Payer: MEDICARE

## 2025-08-26 ENCOUNTER — HOSPITAL ENCOUNTER (OUTPATIENT)
Dept: LAB | Facility: MEDICAL CENTER | Age: 65
End: 2025-08-26
Payer: MEDICARE

## 2025-08-26 DIAGNOSIS — Z13.1 DIABETES MELLITUS SCREENING: ICD-10-CM

## 2025-08-26 DIAGNOSIS — N20.0 NEPHROLITHIASIS: ICD-10-CM

## 2025-08-26 DIAGNOSIS — Z13.220 LIPID SCREENING: ICD-10-CM

## 2025-08-26 LAB
CHOLEST SERPL-MCNC: 193 MG/DL (ref 100–199)
EST. AVERAGE GLUCOSE BLD GHB EST-MCNC: 111 MG/DL
HBA1C MFR BLD: 5.5 % (ref 4–5.6)
HDLC SERPL-MCNC: 30 MG/DL
LDLC SERPL CALC-MCNC: 131 MG/DL
TRIGL SERPL-MCNC: 162 MG/DL (ref 0–149)

## 2025-08-26 PROCEDURE — 80061 LIPID PANEL: CPT

## 2025-08-26 PROCEDURE — 83036 HEMOGLOBIN GLYCOSYLATED A1C: CPT

## 2025-08-26 PROCEDURE — 76775 US EXAM ABDO BACK WALL LIM: CPT

## 2025-08-26 PROCEDURE — 36415 COLL VENOUS BLD VENIPUNCTURE: CPT

## 2025-08-29 LAB
CALCIUM 24H UR-MCNC: 18.9 MG/DL
CALCIUM 24H UR-MRATE: 246 MG/D (ref 100–250)
CHLORIDE 24H UR-SCNC: 147 MMOL/L
CHLORIDE 24H UR-SRATE: 191 MMOL/D (ref 140–250)
CITRATE 24H UR-MCNC: 982 MG/L
CITRATE 24H UR-MRATE: 1277 MG/D (ref 320–1240)
COLLECT DURATION TIME SPEC: 24 HR
CREAT 24H UR-MCNC: 124 MG/DL
MAGNESIUM 24H UR-MCNC: 8.8 MG/DL
MAGNESIUM 24H UR-MRATE: 114 MG/D (ref 12–199)
OXALATE 24H UR-MCNC: 30 MG/L
OXALATE 24H UR-MRATE: 39 MG/D (ref 13–40)
PATHOLOGY STUDY: ABNORMAL
PH UR: 6.16 [PH] (ref 5–7.5)
PHOSPHATE 24H UR-MCNC: 98 MG/DL
PHOSPHATE 24H UR-MRATE: 1274 MG/D (ref 400–1300)
POTASSIUM 24H UR-SCNC: 50 MMOL/L
POTASSIUM 24H UR-SRATE: 65 MMOL/D (ref 25–125)
SODIUM 24H UR-SCNC: 181 MMOL/L
SODIUM 24H UR-SRATE: 235 MMOL/D (ref 51–286)
TOTAL VOLUME 1105: 1300 ML
URATE 24H UR-MCNC: 55.9 MG/DL
URATE 24H UR-MRATE: 727 MG/D (ref 250–750)

## (undated) DEVICE — ELECTRODE 850 FOAM ADHESIVE - HYDROGEL RADIOTRNSPRNT (50/PK)

## (undated) DEVICE — CLOSURE SKIN STRIP 1/2 X 4 IN - (STERI STRIP) (50/BX 4BX/CA)

## (undated) DEVICE — SENSOR SPO2 NEO LNCS ADHESIVE (20/BX) SEE USER NOTES

## (undated) DEVICE — GOWN WARMING STANDARD FLEX - (30/CA)

## (undated) DEVICE — GLOVE BIOGEL SZ 8 SURGICAL PF LTX - (50PR/BX 4BX/CA)

## (undated) DEVICE — COVER FOOT UNIVERSAL DISP. - (25EA/CA)

## (undated) DEVICE — TUBE CONNECT SUCTION CLEAR 120 X 1/4" (50EA/CA)"

## (undated) DEVICE — SET LEADWIRE 5 LEAD BEDSIDE DISPOSABLE ECG (1SET OF 5/EA)

## (undated) DEVICE — SPONGE GAUZESTER 4 X 4 4PLY - (128PK/CA)

## (undated) DEVICE — BAG DRAINAGE URINARY CLOSED 2000ML (20EA/CA)

## (undated) DEVICE — SUCTION INSTRUMENT YANKAUER BULBOUS TIP W/O VENT (50EA/CA)

## (undated) DEVICE — CANISTER SUCTION 3000ML MECHANICAL FILTER AUTO SHUTOFF MEDI-VAC NONSTERILE LF DISP (40EA/CA)

## (undated) DEVICE — PACK CYSTOSCOPY III - (8/CA)

## (undated) DEVICE — WATER IRRIG. STER 3000 ML - (4/CA)

## (undated) DEVICE — WIRE GUIDE SENSOR DUAL FLEX - 5/BX

## (undated) DEVICE — PROTECTOR ULNA NERVE - (36PR/CA)

## (undated) DEVICE — GLOVE BIOGEL PI INDICATOR SZ 8.0 SURGICAL PF LF -(50/BX 4BX/CA)

## (undated) DEVICE — SET EXTENSION WITH 2 PORTS (48EA/CA) ***PART #2C8610 IS A SUBSTITUTE*****

## (undated) DEVICE — LACTATED RINGERS INJ 1000 ML - (14EA/CA 60CA/PF)

## (undated) DEVICE — JELLY, KY 2 0Z STERILE

## (undated) DEVICE — SLEEVE, VASO, THIGH, MED

## (undated) DEVICE — SODIUM CHL. IRRIGATION 0.9% 3000ML (4EA/CA 65CA/PF)

## (undated) DEVICE — SCOPE DIGITAL URETEROSCOPE DISPOSABLE

## (undated) DEVICE — LASER TRAC TIP 200 MIRCON FOR 100 WATT LASER

## (undated) DEVICE — KIT ROOM DECONTAMINATION

## (undated) DEVICE — TOWELS CLOTH SURGICAL - (4/PK 20PK/CA)

## (undated) DEVICE — CONTAINER SPECIMEN BAG OR - STERILE 4 OZ W/LID (100EA/CA)

## (undated) DEVICE — SENSOR OXIMETER ADULT SPO2 RD SET (20EA/BX)

## (undated) DEVICE — GLOVE BIOGEL SZ 7.5 SURGICAL PF LTX - (50PR/BX 4BX/CA)

## (undated) DEVICE — SHEATH URETHAL ACCESS 12/14FR X 35CM (6/BX)

## (undated) DEVICE — SUTURE 3-0 SILK 12 X 18 IN - (36/BX)

## (undated) DEVICE — GOWN SURGEONS X-LARGE - DISP. (30/CA)

## (undated) DEVICE — COVER LIGHT HANDLE ALC PLUS DISP (18EA/BX)

## (undated) DEVICE — GOWN SURGICAL X-LARGE ULTRA - FILM-REINFORCED (20/CA)

## (undated) DEVICE — MASK AIRWAY SIZE 3 UNIQUE SILICON (10/BX)

## (undated) DEVICE — TUBING CLEARLINK DUO-VENT - C-FLO (48EA/CA)

## (undated) DEVICE — PUMP SINGLE ACTION 5/BX

## (undated) DEVICE — NEPTUNE 4 PORT MANIFOLD - (20/PK)

## (undated) DEVICE — CONNECTOR HOSE NEPTUNE FOR CYSTO ROOM

## (undated) DEVICE — CATHETER URET OPEN END 6FR (10EA/BX)

## (undated) DEVICE — SYRINGE DISP. 12 CC LL - (100/BX)

## (undated) DEVICE — BAG URODRAIN WITH TUBING - (20/CA)

## (undated) DEVICE — ADHESIVE MASTISOL - (48/BX)

## (undated) DEVICE — SCOPE DIGITAL URETEROSCOPE DISPOSABLE (10EA/PK)

## (undated) DEVICE — HEAD HOLDER JUNIOR/ADULT

## (undated) DEVICE — MEDICINE CUP STERILE 2 OZ - (100/CA)

## (undated) DEVICE — BAG DRAINAGE LINGEMAN CYSTO/URO (20EA/CA)

## (undated) DEVICE — SYRINGE TOOMEY (50EA/CA)

## (undated) DEVICE — GLOVE BIOGEL PI INDICATOR SZ 6.5 SURGICAL PF LF - (50/BX 4BX/CA)

## (undated) DEVICE — GATEWAY SIDE ARM ADAPTER (10/BX)

## (undated) DEVICE — BASKET ZERO 1.9FR X 120CM

## (undated) DEVICE — FIBER LASER MOSES 200 UM (1/EA)

## (undated) DEVICE — WATER IRRIG. STER. 1500 ML - (9/CA)

## (undated) DEVICE — MASK ANESTHESIA ADULT  - (100/CA)

## (undated) DEVICE — PACK CYSTO III (4EA/CA)

## (undated) DEVICE — SET IRRIGATION CYSTOSCOPY Y-TYPE L81 IN (20EA/CA)

## (undated) DEVICE — WATER IRRIGATION STERILE 1000ML (12EA/CA)

## (undated) DEVICE — KIT ANESTHESIA W/CIRCUIT & 3/LT BAG W/FILTER (20EA/CA)